# Patient Record
Sex: FEMALE | Race: ASIAN | NOT HISPANIC OR LATINO | URBAN - METROPOLITAN AREA
[De-identification: names, ages, dates, MRNs, and addresses within clinical notes are randomized per-mention and may not be internally consistent; named-entity substitution may affect disease eponyms.]

---

## 2017-06-03 ENCOUNTER — EMERGENCY (EMERGENCY)
Facility: HOSPITAL | Age: 62
LOS: 1 days | Discharge: HOME | End: 2017-06-03
Attending: EMERGENCY MEDICINE | Admitting: EMERGENCY MEDICINE
Payer: COMMERCIAL

## 2017-06-03 VITALS
RESPIRATION RATE: 18 BRPM | SYSTOLIC BLOOD PRESSURE: 187 MMHG | OXYGEN SATURATION: 99 % | TEMPERATURE: 100 F | HEART RATE: 90 BPM | DIASTOLIC BLOOD PRESSURE: 99 MMHG

## 2017-06-03 VITALS
RESPIRATION RATE: 19 BRPM | OXYGEN SATURATION: 99 % | SYSTOLIC BLOOD PRESSURE: 180 MMHG | HEART RATE: 85 BPM | TEMPERATURE: 98 F | DIASTOLIC BLOOD PRESSURE: 89 MMHG

## 2017-06-03 DIAGNOSIS — R19.7 DIARRHEA, UNSPECIFIED: ICD-10-CM

## 2017-06-03 DIAGNOSIS — I10 ESSENTIAL (PRIMARY) HYPERTENSION: ICD-10-CM

## 2017-06-03 DIAGNOSIS — Z79.899 OTHER LONG TERM (CURRENT) DRUG THERAPY: ICD-10-CM

## 2017-06-03 DIAGNOSIS — E78.5 HYPERLIPIDEMIA, UNSPECIFIED: ICD-10-CM

## 2017-06-03 DIAGNOSIS — R10.13 EPIGASTRIC PAIN: ICD-10-CM

## 2017-06-03 DIAGNOSIS — R11.2 NAUSEA WITH VOMITING, UNSPECIFIED: ICD-10-CM

## 2017-06-03 DIAGNOSIS — R10.12 LEFT UPPER QUADRANT PAIN: ICD-10-CM

## 2017-06-03 LAB
ALBUMIN SERPL ELPH-MCNC: 4.2 G/DL — SIGNIFICANT CHANGE UP (ref 3.3–5)
ALP SERPL-CCNC: 112 U/L — SIGNIFICANT CHANGE UP (ref 40–120)
ALT FLD-CCNC: 15 U/L RC — SIGNIFICANT CHANGE UP (ref 10–45)
ANION GAP SERPL CALC-SCNC: 12 MMOL/L — SIGNIFICANT CHANGE UP (ref 5–17)
APPEARANCE UR: CLEAR — SIGNIFICANT CHANGE UP
APTT BLD: 30.8 SEC — SIGNIFICANT CHANGE UP (ref 27.5–37.4)
AST SERPL-CCNC: 20 U/L — SIGNIFICANT CHANGE UP (ref 10–40)
BACTERIA # UR AUTO: ABNORMAL /HPF
BASOPHILS # BLD AUTO: 0 K/UL — SIGNIFICANT CHANGE UP (ref 0–0.2)
BASOPHILS NFR BLD AUTO: 0.2 % — SIGNIFICANT CHANGE UP (ref 0–2)
BILIRUB SERPL-MCNC: 1 MG/DL — SIGNIFICANT CHANGE UP (ref 0.2–1.2)
BILIRUB UR-MCNC: NEGATIVE — SIGNIFICANT CHANGE UP
BUN SERPL-MCNC: 18 MG/DL — SIGNIFICANT CHANGE UP (ref 7–23)
CALCIUM SERPL-MCNC: 9.5 MG/DL — SIGNIFICANT CHANGE UP (ref 8.4–10.5)
CHLORIDE SERPL-SCNC: 101 MMOL/L — SIGNIFICANT CHANGE UP (ref 96–108)
CO2 SERPL-SCNC: 28 MMOL/L — SIGNIFICANT CHANGE UP (ref 22–31)
COLOR SPEC: YELLOW — SIGNIFICANT CHANGE UP
CREAT SERPL-MCNC: 1.03 MG/DL — SIGNIFICANT CHANGE UP (ref 0.5–1.3)
DIFF PNL FLD: NEGATIVE — SIGNIFICANT CHANGE UP
EOSINOPHIL # BLD AUTO: 0 K/UL — SIGNIFICANT CHANGE UP (ref 0–0.5)
EOSINOPHIL NFR BLD AUTO: 0.3 % — SIGNIFICANT CHANGE UP (ref 0–6)
EPI CELLS # UR: SIGNIFICANT CHANGE UP /HPF
GAS PNL BLDV: SIGNIFICANT CHANGE UP
GLUCOSE SERPL-MCNC: 84 MG/DL — SIGNIFICANT CHANGE UP (ref 70–99)
GLUCOSE UR QL: NEGATIVE — SIGNIFICANT CHANGE UP
HCT VFR BLD CALC: 42.7 % — SIGNIFICANT CHANGE UP (ref 34.5–45)
HGB BLD-MCNC: 14.4 G/DL — SIGNIFICANT CHANGE UP (ref 11.5–15.5)
HYALINE CASTS # UR AUTO: ABNORMAL
INR BLD: 1.05 RATIO — SIGNIFICANT CHANGE UP (ref 0.88–1.16)
KETONES UR-MCNC: NEGATIVE — SIGNIFICANT CHANGE UP
LEUKOCYTE ESTERASE UR-ACNC: ABNORMAL
LIDOCAIN IGE QN: 24 U/L — SIGNIFICANT CHANGE UP (ref 7–60)
LYMPHOCYTES # BLD AUTO: 2.2 K/UL — SIGNIFICANT CHANGE UP (ref 1–3.3)
LYMPHOCYTES # BLD AUTO: 24 % — SIGNIFICANT CHANGE UP (ref 13–44)
MCHC RBC-ENTMCNC: 29.4 PG — SIGNIFICANT CHANGE UP (ref 27–34)
MCHC RBC-ENTMCNC: 33.6 GM/DL — SIGNIFICANT CHANGE UP (ref 32–36)
MCV RBC AUTO: 87.6 FL — SIGNIFICANT CHANGE UP (ref 80–100)
MONOCYTES # BLD AUTO: 0.7 K/UL — SIGNIFICANT CHANGE UP (ref 0–0.9)
MONOCYTES NFR BLD AUTO: 7.8 % — SIGNIFICANT CHANGE UP (ref 2–14)
NEUTROPHILS # BLD AUTO: 6.3 K/UL — SIGNIFICANT CHANGE UP (ref 1.8–7.4)
NEUTROPHILS NFR BLD AUTO: 67.8 % — SIGNIFICANT CHANGE UP (ref 43–77)
NITRITE UR-MCNC: NEGATIVE — SIGNIFICANT CHANGE UP
PH UR: 6.5 — SIGNIFICANT CHANGE UP (ref 5–8)
PLATELET # BLD AUTO: 225 K/UL — SIGNIFICANT CHANGE UP (ref 150–400)
POTASSIUM SERPL-MCNC: 3.4 MMOL/L — LOW (ref 3.5–5.3)
POTASSIUM SERPL-SCNC: 3.4 MMOL/L — LOW (ref 3.5–5.3)
PROT SERPL-MCNC: 8.1 G/DL — SIGNIFICANT CHANGE UP (ref 6–8.3)
PROT UR-MCNC: 30 MG/DL
PROTHROM AB SERPL-ACNC: 11.3 SEC — SIGNIFICANT CHANGE UP (ref 9.8–12.7)
RBC # BLD: 4.88 M/UL — SIGNIFICANT CHANGE UP (ref 3.8–5.2)
RBC # FLD: 11.9 % — SIGNIFICANT CHANGE UP (ref 10.3–14.5)
RBC CASTS # UR COMP ASSIST: SIGNIFICANT CHANGE UP /HPF (ref 0–2)
SODIUM SERPL-SCNC: 141 MMOL/L — SIGNIFICANT CHANGE UP (ref 135–145)
SP GR SPEC: 1.03 — HIGH (ref 1.01–1.02)
UROBILINOGEN FLD QL: NEGATIVE — SIGNIFICANT CHANGE UP
WBC # BLD: 9.3 K/UL — SIGNIFICANT CHANGE UP (ref 3.8–10.5)
WBC # FLD AUTO: 9.3 K/UL — SIGNIFICANT CHANGE UP (ref 3.8–10.5)
WBC UR QL: SIGNIFICANT CHANGE UP /HPF (ref 0–5)

## 2017-06-03 PROCEDURE — 85730 THROMBOPLASTIN TIME PARTIAL: CPT

## 2017-06-03 PROCEDURE — 84132 ASSAY OF SERUM POTASSIUM: CPT

## 2017-06-03 PROCEDURE — 85014 HEMATOCRIT: CPT

## 2017-06-03 PROCEDURE — 85610 PROTHROMBIN TIME: CPT

## 2017-06-03 PROCEDURE — 99285 EMERGENCY DEPT VISIT HI MDM: CPT

## 2017-06-03 PROCEDURE — 82803 BLOOD GASES ANY COMBINATION: CPT

## 2017-06-03 PROCEDURE — 85027 COMPLETE CBC AUTOMATED: CPT

## 2017-06-03 PROCEDURE — 74177 CT ABD & PELVIS W/CONTRAST: CPT

## 2017-06-03 PROCEDURE — 82947 ASSAY GLUCOSE BLOOD QUANT: CPT

## 2017-06-03 PROCEDURE — 83690 ASSAY OF LIPASE: CPT

## 2017-06-03 PROCEDURE — 80053 COMPREHEN METABOLIC PANEL: CPT

## 2017-06-03 PROCEDURE — 96374 THER/PROPH/DIAG INJ IV PUSH: CPT | Mod: XU

## 2017-06-03 PROCEDURE — 84295 ASSAY OF SERUM SODIUM: CPT

## 2017-06-03 PROCEDURE — 99284 EMERGENCY DEPT VISIT MOD MDM: CPT | Mod: 25

## 2017-06-03 PROCEDURE — 82435 ASSAY OF BLOOD CHLORIDE: CPT

## 2017-06-03 PROCEDURE — 81001 URINALYSIS AUTO W/SCOPE: CPT

## 2017-06-03 PROCEDURE — 82330 ASSAY OF CALCIUM: CPT

## 2017-06-03 PROCEDURE — 83605 ASSAY OF LACTIC ACID: CPT

## 2017-06-03 PROCEDURE — 74177 CT ABD & PELVIS W/CONTRAST: CPT | Mod: 26

## 2017-06-03 RX ORDER — CIPROFLOXACIN LACTATE 400MG/40ML
500 VIAL (ML) INTRAVENOUS ONCE
Qty: 0 | Refills: 0 | Status: COMPLETED | OUTPATIENT
Start: 2017-06-03 | End: 2017-06-03

## 2017-06-03 RX ORDER — ONDANSETRON 8 MG/1
4 TABLET, FILM COATED ORAL ONCE
Qty: 0 | Refills: 0 | Status: COMPLETED | OUTPATIENT
Start: 2017-06-03 | End: 2017-06-03

## 2017-06-03 RX ORDER — SODIUM CHLORIDE 9 MG/ML
1000 INJECTION INTRAMUSCULAR; INTRAVENOUS; SUBCUTANEOUS ONCE
Qty: 0 | Refills: 0 | Status: COMPLETED | OUTPATIENT
Start: 2017-06-03 | End: 2017-06-03

## 2017-06-03 RX ADMIN — Medication 500 MILLIGRAM(S): at 22:49

## 2017-06-03 RX ADMIN — ONDANSETRON 4 MILLIGRAM(S): 8 TABLET, FILM COATED ORAL at 16:52

## 2017-06-03 RX ADMIN — SODIUM CHLORIDE 4000 MILLILITER(S): 9 INJECTION INTRAMUSCULAR; INTRAVENOUS; SUBCUTANEOUS at 16:52

## 2017-06-03 NOTE — ED ADULT NURSE NOTE - CAS EDN DISCHARGE ASSESSMENT
Alert and oriented to person, place and time/No adverse reaction to first time med in ED Alert and oriented to person, place and time/dr almendarez made aware of sbp 180; pt is due for antihypertensive meds and will take them at home; ok for dc/No adverse reaction to first time med in ED

## 2017-06-03 NOTE — ED PROVIDER NOTE - ATTENDING CONTRIBUTION TO CARE
pt is a 60 y/o female c/o diarrhea multiple bouts, abd soft, mild epigastric tenderness, vomited a few times, no sick contacts likely gastroenteritis, ivf, labs nl. ct already ordered if nl to d.c home.

## 2017-06-03 NOTE — ED ADULT NURSE NOTE - OBJECTIVE STATEMENT
61 year old female alert and oriented x 4 came to the ED accompanied by  c/o abdominal pain, nausea, vomiting and diarrhea.  Patient said she wasn't feeling well in her Yoga class yesterday and later that night she vomited.  Since last night patient reports about 12 bouts of liquid stool and says she has not been able to eat or drink much, but has been able to hold a few sips of ginger ale down.  Patient reports dizziness, but  denies falls.  Pain is in the epigastric region and is 8/10 pain and denies taking medications for pain or diarrhea.  Patient took her BP medications today.  Patient denies; chest pain, shortness of breath, pain or burning on urination,  fevers. Patient has unlabored breathing with equal and symmetrical chest rise.  IV established in the right AC #20.  Safety ensured.

## 2017-06-03 NOTE — ED PROVIDER NOTE - OBJECTIVE STATEMENT
60 y/o F presents with 1 day of nausea/ vomiting, diarrhea, abdominal pain. no fever, no chills.  pain is left sided upper and lower nonradiating moderate and dull. no sick contact.s

## 2017-07-11 ENCOUNTER — INPATIENT (INPATIENT)
Facility: HOSPITAL | Age: 62
LOS: 2 days | Discharge: HOME | DRG: 304 | End: 2017-07-14
Attending: INTERNAL MEDICINE | Admitting: HOSPITALIST
Payer: COMMERCIAL

## 2017-07-11 VITALS
TEMPERATURE: 99 F | SYSTOLIC BLOOD PRESSURE: 201 MMHG | HEART RATE: 86 BPM | OXYGEN SATURATION: 100 % | DIASTOLIC BLOOD PRESSURE: 116 MMHG | RESPIRATION RATE: 20 BRPM

## 2017-07-11 LAB
APPEARANCE UR: CLEAR — SIGNIFICANT CHANGE UP
BASE EXCESS BLDV CALC-SCNC: 4.5 MMOL/L — HIGH (ref -2–2)
BILIRUB UR-MCNC: NEGATIVE — SIGNIFICANT CHANGE UP
CA-I SERPL-SCNC: 1.2 MMOL/L — SIGNIFICANT CHANGE UP (ref 1.12–1.3)
CHLORIDE BLDV-SCNC: 105 MMOL/L — SIGNIFICANT CHANGE UP (ref 96–108)
CK MB BLD-MCNC: 2.2 % — SIGNIFICANT CHANGE UP (ref 0–3.5)
CK MB CFR SERPL CALC: 3.1 NG/ML — SIGNIFICANT CHANGE UP (ref 0–3.8)
CK SERPL-CCNC: 140 U/L — SIGNIFICANT CHANGE UP (ref 25–170)
CO2 BLDV-SCNC: 33 MMOL/L — HIGH (ref 22–30)
COLOR SPEC: SIGNIFICANT CHANGE UP
DIFF PNL FLD: NEGATIVE — SIGNIFICANT CHANGE UP
EPI CELLS # UR: SIGNIFICANT CHANGE UP /HPF
ERYTHROCYTE [SEDIMENTATION RATE] IN BLOOD: 53 MM/HR — HIGH (ref 0–20)
GAS PNL BLDV: 143 MMOL/L — SIGNIFICANT CHANGE UP (ref 136–145)
GAS PNL BLDV: SIGNIFICANT CHANGE UP
GAS PNL BLDV: SIGNIFICANT CHANGE UP
GLUCOSE BLDV-MCNC: 87 MG/DL — SIGNIFICANT CHANGE UP (ref 70–99)
GLUCOSE UR QL: NEGATIVE — SIGNIFICANT CHANGE UP
HCO3 BLDV-SCNC: 31 MMOL/L — HIGH (ref 21–29)
HCT VFR BLD CALC: 40.7 % — SIGNIFICANT CHANGE UP (ref 34.5–45)
HCT VFR BLDA CALC: 42 % — SIGNIFICANT CHANGE UP (ref 39–50)
HGB BLD CALC-MCNC: 13.6 G/DL — SIGNIFICANT CHANGE UP (ref 11.5–15.5)
HGB BLD-MCNC: 13.6 G/DL — SIGNIFICANT CHANGE UP (ref 11.5–15.5)
KETONES UR-MCNC: NEGATIVE — SIGNIFICANT CHANGE UP
LACTATE BLDV-MCNC: 1.3 MMOL/L — SIGNIFICANT CHANGE UP (ref 0.7–2)
LEUKOCYTE ESTERASE UR-ACNC: ABNORMAL
MCHC RBC-ENTMCNC: 29.1 PG — SIGNIFICANT CHANGE UP (ref 27–34)
MCHC RBC-ENTMCNC: 33.5 GM/DL — SIGNIFICANT CHANGE UP (ref 32–36)
MCV RBC AUTO: 86.8 FL — SIGNIFICANT CHANGE UP (ref 80–100)
NITRITE UR-MCNC: NEGATIVE — SIGNIFICANT CHANGE UP
PCO2 BLDV: 58 MMHG — HIGH (ref 35–50)
PH BLDV: 7.35 — SIGNIFICANT CHANGE UP (ref 7.35–7.45)
PH UR: 7 — SIGNIFICANT CHANGE UP (ref 5–8)
PLATELET # BLD AUTO: 266 K/UL — SIGNIFICANT CHANGE UP (ref 150–400)
PO2 BLDV: 24 MMHG — LOW (ref 25–45)
POTASSIUM BLDV-SCNC: 3.9 MMOL/L — SIGNIFICANT CHANGE UP (ref 3.5–5)
PROT UR-MCNC: NEGATIVE — SIGNIFICANT CHANGE UP
RBC # BLD: 4.68 M/UL — SIGNIFICANT CHANGE UP (ref 3.8–5.2)
RBC # FLD: 11.7 % — SIGNIFICANT CHANGE UP (ref 10.3–14.5)
RBC CASTS # UR COMP ASSIST: SIGNIFICANT CHANGE UP /HPF (ref 0–2)
SAO2 % BLDV: 31 % — LOW (ref 67–88)
SP GR SPEC: 1.01 — SIGNIFICANT CHANGE UP (ref 1.01–1.02)
TROPONIN T SERPL-MCNC: <0.01 NG/ML — SIGNIFICANT CHANGE UP (ref 0–0.06)
UROBILINOGEN FLD QL: NEGATIVE — SIGNIFICANT CHANGE UP
WBC # BLD: 9.8 K/UL — SIGNIFICANT CHANGE UP (ref 3.8–10.5)
WBC # FLD AUTO: 9.8 K/UL — SIGNIFICANT CHANGE UP (ref 3.8–10.5)
WBC UR QL: SIGNIFICANT CHANGE UP /HPF (ref 0–5)

## 2017-07-11 PROCEDURE — 93010 ELECTROCARDIOGRAM REPORT: CPT

## 2017-07-11 PROCEDURE — 70450 CT HEAD/BRAIN W/O DYE: CPT | Mod: 26

## 2017-07-11 PROCEDURE — 99285 EMERGENCY DEPT VISIT HI MDM: CPT | Mod: 25

## 2017-07-11 RX ORDER — HYDRALAZINE HCL 50 MG
10 TABLET ORAL ONCE
Qty: 0 | Refills: 0 | Status: COMPLETED | OUTPATIENT
Start: 2017-07-11 | End: 2017-07-11

## 2017-07-11 RX ORDER — MORPHINE SULFATE 50 MG/1
4 CAPSULE, EXTENDED RELEASE ORAL ONCE
Qty: 0 | Refills: 0 | Status: DISCONTINUED | OUTPATIENT
Start: 2017-07-11 | End: 2017-07-11

## 2017-07-11 RX ADMIN — MORPHINE SULFATE 4 MILLIGRAM(S): 50 CAPSULE, EXTENDED RELEASE ORAL at 21:19

## 2017-07-11 RX ADMIN — MORPHINE SULFATE 4 MILLIGRAM(S): 50 CAPSULE, EXTENDED RELEASE ORAL at 20:34

## 2017-07-11 RX ADMIN — Medication 10 MILLIGRAM(S): at 20:34

## 2017-07-11 NOTE — ED PROVIDER NOTE - OBJECTIVE STATEMENT
Pt is a 63 yo F with a PMH of HTN and HLD who presents to the ED c/o a Pt is a 63 yo F with a PMH of HTN and HLD who presents to the ED c/o of a new onset headache and high blood pressure. At 2pm today, the pt began having a R temporal headache that was "throbbing" and 10/10 on the pain scale. She went to an urgent care and was found to have BPs in the 200s/110s and was instructed to come to the ER for further evaluation. She typically has BPs in the 170s-180s systolic and takes amlodipine, HCTZ, hydralazine, and labetalol regularly. Today in the ED, her pressures remain in the 200s/110s. She has had intermittent blurry vision, palpitations, and nausea associated with the HA. She denies any photophobia, vomiting, dizziness, CP, SOB, or abdominal pain. She last was in the ED on 6/3/17 for NVD and abdominal pain with BPs in the 200s systolic, however she has never experienced this HA before.

## 2017-07-11 NOTE — ED PROVIDER NOTE - ATTENDING CONTRIBUTION TO CARE
I have seen and evaluated this patient with the resident.   I agree with the findings  unless other wise stated.  I have made appropriate changes in documentations where needed, After my face to face bedside evaluation, I am further  noting:

## 2017-07-11 NOTE — ED ADULT NURSE NOTE - OBJECTIVE STATEMENT
62 y.o. Female presents to the ED c/o headache on R temporal area since 2pm and hypertension. Hx HTN and HLD - on labetolol, simivastatin, hydralazine and amlodipine. Pt states she was shopping at [employer] when she felt the headache. Pt describes the headache as a sharp headache that comes and goes. Pt also c/o R eye blurriness with the headache. Pt went to urgent care and was sent here for hypertension. Pt did not take anything for the pain. A&Ox3. Denies CP, SOB, N/V/D, urinary/bowel complications, fever/chills. Pt is in no current distress. Comfort and safety provided.  at bedside.

## 2017-07-11 NOTE — ED PROVIDER NOTE - MEDICAL DECISION MAKING DETAILS
Pt with uncontrolled BP on multiple meds headache Pt with uncontrolled BP on multiple meds headache and blurry vision with elevated BOP pt states she is compliant to meds will admit-- Lin

## 2017-07-11 NOTE — ED ADULT NURSE REASSESSMENT NOTE - NS ED NURSE REASSESS COMMENT FT1
Pt is in no current distress. Comfort and safety provided.  at bedside.
Pt is in no current distress. Comfort and safety provided. pt states blurry vision of R eye has resolved.

## 2017-07-12 DIAGNOSIS — I16.0 HYPERTENSIVE URGENCY: ICD-10-CM

## 2017-07-12 DIAGNOSIS — H53.8 OTHER VISUAL DISTURBANCES: ICD-10-CM

## 2017-07-12 DIAGNOSIS — E78.5 HYPERLIPIDEMIA, UNSPECIFIED: ICD-10-CM

## 2017-07-12 DIAGNOSIS — I16.1 HYPERTENSIVE EMERGENCY: ICD-10-CM

## 2017-07-12 DIAGNOSIS — Z29.9 ENCOUNTER FOR PROPHYLACTIC MEASURES, UNSPECIFIED: ICD-10-CM

## 2017-07-12 DIAGNOSIS — R47.1 DYSARTHRIA AND ANARTHRIA: ICD-10-CM

## 2017-07-12 LAB
ALBUMIN SERPL ELPH-MCNC: 3.9 G/DL — SIGNIFICANT CHANGE UP (ref 3.3–5)
ALP SERPL-CCNC: 115 U/L — SIGNIFICANT CHANGE UP (ref 40–120)
ALT FLD-CCNC: 18 U/L RC — SIGNIFICANT CHANGE UP (ref 10–45)
ANION GAP SERPL CALC-SCNC: 15 MMOL/L — SIGNIFICANT CHANGE UP (ref 5–17)
APTT BLD: 33.1 SEC — SIGNIFICANT CHANGE UP (ref 27.5–37.4)
AST SERPL-CCNC: 19 U/L — SIGNIFICANT CHANGE UP (ref 10–40)
BASOPHILS # BLD AUTO: 0 K/UL — SIGNIFICANT CHANGE UP (ref 0–0.2)
BASOPHILS # BLD AUTO: 0.1 K/UL — SIGNIFICANT CHANGE UP (ref 0–0.2)
BASOPHILS NFR BLD AUTO: 0.3 % — SIGNIFICANT CHANGE UP (ref 0–2)
BASOPHILS NFR BLD AUTO: 0.6 % — SIGNIFICANT CHANGE UP (ref 0–2)
BILIRUB SERPL-MCNC: 0.6 MG/DL — SIGNIFICANT CHANGE UP (ref 0.2–1.2)
BUN SERPL-MCNC: 11 MG/DL — SIGNIFICANT CHANGE UP (ref 7–23)
CALCIUM SERPL-MCNC: 9.1 MG/DL — SIGNIFICANT CHANGE UP (ref 8.4–10.5)
CHLORIDE SERPL-SCNC: 102 MMOL/L — SIGNIFICANT CHANGE UP (ref 96–108)
CK MB CFR SERPL CALC: 2 NG/ML — SIGNIFICANT CHANGE UP (ref 0–3.8)
CK SERPL-CCNC: 77 U/L — SIGNIFICANT CHANGE UP (ref 25–170)
CO2 SERPL-SCNC: 26 MMOL/L — SIGNIFICANT CHANGE UP (ref 22–31)
CREAT SERPL-MCNC: 0.8 MG/DL — SIGNIFICANT CHANGE UP (ref 0.5–1.3)
EOSINOPHIL # BLD AUTO: 0.1 K/UL — SIGNIFICANT CHANGE UP (ref 0–0.5)
EOSINOPHIL # BLD AUTO: 0.2 K/UL — SIGNIFICANT CHANGE UP (ref 0–0.5)
EOSINOPHIL NFR BLD AUTO: 1.4 % — SIGNIFICANT CHANGE UP (ref 0–6)
EOSINOPHIL NFR BLD AUTO: 1.5 % — SIGNIFICANT CHANGE UP (ref 0–6)
GLUCOSE SERPL-MCNC: 104 MG/DL — HIGH (ref 70–99)
HBA1C BLD-MCNC: 5.3 % — SIGNIFICANT CHANGE UP (ref 4–5.6)
HCT VFR BLD CALC: 36.1 % — SIGNIFICANT CHANGE UP (ref 34.5–45)
HCT VFR BLD CALC: 38.4 % — SIGNIFICANT CHANGE UP (ref 34.5–45)
HGB BLD-MCNC: 12.4 G/DL — SIGNIFICANT CHANGE UP (ref 11.5–15.5)
HGB BLD-MCNC: 13.4 G/DL — SIGNIFICANT CHANGE UP (ref 11.5–15.5)
INR BLD: 1.06 RATIO — SIGNIFICANT CHANGE UP (ref 0.88–1.16)
LYMPHOCYTES # BLD AUTO: 2.4 K/UL — SIGNIFICANT CHANGE UP (ref 1–3.3)
LYMPHOCYTES # BLD AUTO: 2.5 K/UL — SIGNIFICANT CHANGE UP (ref 1–3.3)
LYMPHOCYTES # BLD AUTO: 21.8 % — SIGNIFICANT CHANGE UP (ref 13–44)
LYMPHOCYTES # BLD AUTO: 27.3 % — SIGNIFICANT CHANGE UP (ref 13–44)
MAGNESIUM SERPL-MCNC: 2.2 MG/DL — SIGNIFICANT CHANGE UP (ref 1.6–2.6)
MCHC RBC-ENTMCNC: 29.9 PG — SIGNIFICANT CHANGE UP (ref 27–34)
MCHC RBC-ENTMCNC: 30.2 PG — SIGNIFICANT CHANGE UP (ref 27–34)
MCHC RBC-ENTMCNC: 34.3 GM/DL — SIGNIFICANT CHANGE UP (ref 32–36)
MCHC RBC-ENTMCNC: 34.9 GM/DL — SIGNIFICANT CHANGE UP (ref 32–36)
MCV RBC AUTO: 86.7 FL — SIGNIFICANT CHANGE UP (ref 80–100)
MCV RBC AUTO: 87.3 FL — SIGNIFICANT CHANGE UP (ref 80–100)
MONOCYTES # BLD AUTO: 0.5 K/UL — SIGNIFICANT CHANGE UP (ref 0–0.9)
MONOCYTES # BLD AUTO: 0.6 K/UL — SIGNIFICANT CHANGE UP (ref 0–0.9)
MONOCYTES NFR BLD AUTO: 4.7 % — SIGNIFICANT CHANGE UP (ref 2–14)
MONOCYTES NFR BLD AUTO: 6.3 % — SIGNIFICANT CHANGE UP (ref 2–14)
NEUTROPHILS # BLD AUTO: 5.9 K/UL — SIGNIFICANT CHANGE UP (ref 1.8–7.4)
NEUTROPHILS # BLD AUTO: 7.9 K/UL — HIGH (ref 1.8–7.4)
NEUTROPHILS NFR BLD AUTO: 64.7 % — SIGNIFICANT CHANGE UP (ref 43–77)
NEUTROPHILS NFR BLD AUTO: 71.5 % — SIGNIFICANT CHANGE UP (ref 43–77)
PHOSPHATE SERPL-MCNC: 3.7 MG/DL — SIGNIFICANT CHANGE UP (ref 2.5–4.5)
PLATELET # BLD AUTO: 251 K/UL — SIGNIFICANT CHANGE UP (ref 150–400)
PLATELET # BLD AUTO: 261 K/UL — SIGNIFICANT CHANGE UP (ref 150–400)
POTASSIUM SERPL-MCNC: 3.4 MMOL/L — LOW (ref 3.5–5.3)
POTASSIUM SERPL-SCNC: 3.4 MMOL/L — LOW (ref 3.5–5.3)
PROT SERPL-MCNC: 7.4 G/DL — SIGNIFICANT CHANGE UP (ref 6–8.3)
PROTHROM AB SERPL-ACNC: 11.6 SEC — SIGNIFICANT CHANGE UP (ref 9.8–12.7)
RBC # BLD: 4.14 M/UL — SIGNIFICANT CHANGE UP (ref 3.8–5.2)
RBC # BLD: 4.43 M/UL — SIGNIFICANT CHANGE UP (ref 3.8–5.2)
RBC # FLD: 11.7 % — SIGNIFICANT CHANGE UP (ref 10.3–14.5)
RBC # FLD: 11.8 % — SIGNIFICANT CHANGE UP (ref 10.3–14.5)
SODIUM SERPL-SCNC: 143 MMOL/L — SIGNIFICANT CHANGE UP (ref 135–145)
TROPONIN T SERPL-MCNC: <0.01 NG/ML — SIGNIFICANT CHANGE UP (ref 0–0.06)
TSH SERPL-MCNC: 1.71 UIU/ML — SIGNIFICANT CHANGE UP (ref 0.27–4.2)
WBC # BLD: 11 K/UL — HIGH (ref 3.8–10.5)
WBC # BLD: 9.1 K/UL — SIGNIFICANT CHANGE UP (ref 3.8–10.5)
WBC # FLD AUTO: 11 K/UL — HIGH (ref 3.8–10.5)
WBC # FLD AUTO: 9.1 K/UL — SIGNIFICANT CHANGE UP (ref 3.8–10.5)

## 2017-07-12 PROCEDURE — 71010: CPT | Mod: 26,59

## 2017-07-12 PROCEDURE — 99223 1ST HOSP IP/OBS HIGH 75: CPT

## 2017-07-12 PROCEDURE — 71020: CPT | Mod: 26

## 2017-07-12 PROCEDURE — 70450 CT HEAD/BRAIN W/O DYE: CPT | Mod: 26

## 2017-07-12 PROCEDURE — 93975 VASCULAR STUDY: CPT | Mod: 26

## 2017-07-12 PROCEDURE — 93010 ELECTROCARDIOGRAM REPORT: CPT

## 2017-07-12 RX ORDER — DOCUSATE SODIUM 100 MG
100 CAPSULE ORAL THREE TIMES A DAY
Qty: 0 | Refills: 0 | Status: DISCONTINUED | OUTPATIENT
Start: 2017-07-12 | End: 2017-07-14

## 2017-07-12 RX ORDER — HYDRALAZINE HCL 50 MG
20 TABLET ORAL THREE TIMES A DAY
Qty: 0 | Refills: 0 | Status: DISCONTINUED | OUTPATIENT
Start: 2017-07-12 | End: 2017-07-12

## 2017-07-12 RX ORDER — LABETALOL HCL 100 MG
400 TABLET ORAL
Qty: 0 | Refills: 0 | Status: DISCONTINUED | OUTPATIENT
Start: 2017-07-12 | End: 2017-07-12

## 2017-07-12 RX ORDER — SENNA PLUS 8.6 MG/1
2 TABLET ORAL AT BEDTIME
Qty: 0 | Refills: 0 | Status: DISCONTINUED | OUTPATIENT
Start: 2017-07-12 | End: 2017-07-14

## 2017-07-12 RX ORDER — MONTELUKAST 4 MG/1
10 TABLET, CHEWABLE ORAL DAILY
Qty: 0 | Refills: 0 | Status: DISCONTINUED | OUTPATIENT
Start: 2017-07-12 | End: 2017-07-14

## 2017-07-12 RX ORDER — HEPARIN SODIUM 5000 [USP'U]/ML
5000 INJECTION INTRAVENOUS; SUBCUTANEOUS EVERY 8 HOURS
Qty: 0 | Refills: 0 | Status: DISCONTINUED | OUTPATIENT
Start: 2017-07-12 | End: 2017-07-13

## 2017-07-12 RX ORDER — DICLOFENAC SODIUM 75 MG/1
1 TABLET, DELAYED RELEASE ORAL
Qty: 0 | Refills: 0 | COMMUNITY

## 2017-07-12 RX ORDER — METOPROLOL TARTRATE 50 MG
1 TABLET ORAL
Qty: 0 | Refills: 0 | COMMUNITY

## 2017-07-12 RX ORDER — BUPROPION HYDROCHLORIDE 150 MG/1
1 TABLET, EXTENDED RELEASE ORAL
Qty: 0 | Refills: 0 | COMMUNITY

## 2017-07-12 RX ORDER — SIMVASTATIN 20 MG/1
40 TABLET, FILM COATED ORAL AT BEDTIME
Qty: 0 | Refills: 0 | Status: DISCONTINUED | OUTPATIENT
Start: 2017-07-12 | End: 2017-07-14

## 2017-07-12 RX ORDER — TRAZODONE HCL 50 MG
1 TABLET ORAL
Qty: 0 | Refills: 0 | COMMUNITY

## 2017-07-12 RX ORDER — TRAZODONE HCL 50 MG
300 TABLET ORAL AT BEDTIME
Qty: 0 | Refills: 0 | Status: DISCONTINUED | OUTPATIENT
Start: 2017-07-12 | End: 2017-07-14

## 2017-07-12 RX ORDER — POTASSIUM CHLORIDE 20 MEQ
40 PACKET (EA) ORAL ONCE
Qty: 0 | Refills: 0 | Status: COMPLETED | OUTPATIENT
Start: 2017-07-12 | End: 2017-07-12

## 2017-07-12 RX ORDER — VERAPAMIL HCL 240 MG
180 CAPSULE, EXTENDED RELEASE PELLETS 24 HR ORAL DAILY
Qty: 0 | Refills: 0 | Status: DISCONTINUED | OUTPATIENT
Start: 2017-07-12 | End: 2017-07-12

## 2017-07-12 RX ORDER — LABETALOL HCL 100 MG
1 TABLET ORAL
Qty: 0 | Refills: 0 | COMMUNITY

## 2017-07-12 RX ORDER — MELOXICAM 15 MG/1
0 TABLET ORAL
Qty: 0 | Refills: 0 | COMMUNITY

## 2017-07-12 RX ORDER — ESCITALOPRAM OXALATE 10 MG/1
10 TABLET, FILM COATED ORAL DAILY
Qty: 0 | Refills: 0 | Status: DISCONTINUED | OUTPATIENT
Start: 2017-07-12 | End: 2017-07-14

## 2017-07-12 RX ORDER — SERTRALINE 25 MG/1
1 TABLET, FILM COATED ORAL
Qty: 0 | Refills: 0 | COMMUNITY

## 2017-07-12 RX ORDER — POTASSIUM CHLORIDE 20 MEQ
20 PACKET (EA) ORAL ONCE
Qty: 0 | Refills: 0 | Status: COMPLETED | OUTPATIENT
Start: 2017-07-12 | End: 2017-07-12

## 2017-07-12 RX ORDER — AMLODIPINE BESYLATE 2.5 MG/1
10 TABLET ORAL DAILY
Qty: 0 | Refills: 0 | Status: DISCONTINUED | OUTPATIENT
Start: 2017-07-12 | End: 2017-07-12

## 2017-07-12 RX ORDER — HYDROCHLOROTHIAZIDE 25 MG
25 TABLET ORAL DAILY
Qty: 0 | Refills: 0 | Status: DISCONTINUED | OUTPATIENT
Start: 2017-07-12 | End: 2017-07-12

## 2017-07-12 RX ADMIN — Medication 20 MILLIGRAM(S): at 05:49

## 2017-07-12 RX ADMIN — Medication 180 MILLIGRAM(S): at 05:50

## 2017-07-12 RX ADMIN — SIMVASTATIN 40 MILLIGRAM(S): 20 TABLET, FILM COATED ORAL at 21:18

## 2017-07-12 RX ADMIN — ESCITALOPRAM OXALATE 10 MILLIGRAM(S): 10 TABLET, FILM COATED ORAL at 16:52

## 2017-07-12 RX ADMIN — HEPARIN SODIUM 5000 UNIT(S): 5000 INJECTION INTRAVENOUS; SUBCUTANEOUS at 16:52

## 2017-07-12 RX ADMIN — HEPARIN SODIUM 5000 UNIT(S): 5000 INJECTION INTRAVENOUS; SUBCUTANEOUS at 21:18

## 2017-07-12 RX ADMIN — AMLODIPINE BESYLATE 10 MILLIGRAM(S): 2.5 TABLET ORAL at 05:48

## 2017-07-12 RX ADMIN — Medication 300 MILLIGRAM(S): at 21:17

## 2017-07-12 RX ADMIN — HEPARIN SODIUM 5000 UNIT(S): 5000 INJECTION INTRAVENOUS; SUBCUTANEOUS at 05:48

## 2017-07-12 RX ADMIN — Medication 25 MILLIGRAM(S): at 05:50

## 2017-07-12 RX ADMIN — MONTELUKAST 10 MILLIGRAM(S): 4 TABLET, CHEWABLE ORAL at 16:51

## 2017-07-12 RX ADMIN — Medication 40 MILLIEQUIVALENT(S): at 21:17

## 2017-07-12 RX ADMIN — Medication 100 MILLIGRAM(S): at 16:52

## 2017-07-12 RX ADMIN — Medication 100 MILLIGRAM(S): at 21:18

## 2017-07-12 RX ADMIN — Medication 400 MILLIGRAM(S): at 05:50

## 2017-07-12 RX ADMIN — Medication 20 MILLIEQUIVALENT(S): at 18:24

## 2017-07-12 RX ADMIN — Medication 100 MILLIGRAM(S): at 05:48

## 2017-07-12 NOTE — CHART NOTE - NSCHARTNOTEFT_GEN_A_CORE
MAR Rapid Response Note    63 y/o F hx HTN admitted with blurry vision and h/a with SBP>200 overnight. Rapid response called for hypotension 75/42. Patient also with garbled speech and weakness prompting Code Stroke. Neurology and primary team at bedside. She received several medications at one time including amlodipine, HCTZ, hydralazine, verapamil, and labetalol. Patient received 1L fluid bolus and blood pressure improved to SBP 99/63. Neurological exam at bedside non-focal according to neurology resident. Patient's speech improved during rapid response. Impression is of iatrogenic hypotension secondary to receiving multiple oral medications at one time.   Recommendation:  -hold BP meds for now  -frequent vital signs  -IVF as required given several of medications are long acting  -neuro recommends eventual non-con CTH when BP normalizes  -discussed with primary team at bedside.

## 2017-07-12 NOTE — H&P ADULT - NSHPSOCIALHISTORY_GEN_ALL_CORE
Social History:    Marital Status: ( x ) , (  ) Single, (  ) , (  )   Lives with: (  ) alone, ( x ) children, (  ) spouse, (  ) parents, (  ) other  Occupation:     Substance Use/Illicit Drugs: (  ) never used, (  ) other:   Tobacco Usage: ( x ) never smoked, (  ) former smoker, (  ) current smoker, (  ) last cigarette date: , Total Pack-Years:   Alcohol Usage: socially

## 2017-07-12 NOTE — H&P ADULT - PROBLEM SELECTOR PLAN 2
- pt reportedly saw ophtho 2 weeks ago  - recommend getting ophtho exam results to eval for hypertensive emergency or GCA.  - less likely GCA, cont to monitor

## 2017-07-12 NOTE — CONSULT NOTE ADULT - SUBJECTIVE AND OBJECTIVE BOX
Neurology Consult    Name  YESSICA DOVER    62F c hx htn, hld, anxiety, depression, asthma, pw acute onset headache and blurry vision.  Pt states she's never had headache like this before. Today, she had about 14 episodes of brief 10/10 right temporal headache associated with nausea, palpitations, and right eye blurry vision.  Patient found with /110.  Admitted to [facility] for hypertensive emergency.  On 7/12/17, she was administered multiple blood pressure medications, which ultimately dropped her SBP to 80s.  Patient began to have garbled speech to nursing staff, which prompted a stroke code.  Patient was examined at bedside,   She had no focal neurological deficits, and dysarthria was non-focal.  NIHSS 0, MRS 0                                                           MEDICATIONS  (STANDING):  escitalopram 10 milliGRAM(s) Oral daily  traZODone 300 milliGRAM(s) Oral at bedtime  simvastatin 40 milliGRAM(s) Oral at bedtime  montelukast 10 milliGRAM(s) Oral daily  heparin  Injectable 5000 Unit(s) SubCutaneous every 8 hours  docusate sodium 100 milliGRAM(s) Oral three times a day    MEDICATIONS  (PRN):  senna 2 Tablet(s) Oral at bedtime PRN Constipation      Allergies    No Known Allergies    Intolerances        Objective  Vital Signs Last 24 Hrs  T(C): 36.6 (12 Jul 2017 05:10), Max: 37 (11 Jul 2017 19:15)  T(F): 97.8 (12 Jul 2017 05:10), Max: 98.6 (11 Jul 2017 19:15)  HR: 65 (12 Jul 2017 08:06) (65 - 97)  BP: 75/42 (12 Jul 2017 08:06) (75/42 - 206/110)  BP(mean): --  RR: 18 (12 Jul 2017 08:06) (14 - 20)  SpO2: 99% (12 Jul 2017 08:06) (97% - 100%)    General Exam   General appearance: No acute distress, well-nourished  Respiratory:    non-labored respirations               Neurological Exam  Mental Status:  alert and oriented x3, fluent speech, following commands  Cranial Nerves: PERRL, EOMI without nystagmus, visual fields intact no facial droop, no dysarthria    Motor:   Tone:   normal               Strength:  Upper extremity                          Delt       Bicep    Tricep                                                  R         5/5        5/5        5/5       5/5                                               L          5/5        5/5        5/5      5/5    Lower extremity                           HF          KE          KF        DF         PF                                               R        5/5        5/5        5/5       5/5       5/5                                               L         5/5        5/5        5/5      5/5        5/5    Pronator drift:   none           Dysmetria: none with finger-to-nose testing  Tremor:  none appreciated at rest or in action    Sensation: intact grossly to light touch        Other Studies    07-12    143  |  102  |  11  ----------------------------<  104<H>  3.4<L>   |  26  |  0.80    Ca    9.1      12 Jul 2017 05:27  Phos  3.7     07-12  Mg     2.2     07-12    TPro  7.4  /  Alb  3.9  /  TBili  0.6  /  DBili  x   /  AST  19  /  ALT  18  /  AlkPhos  115  07-12 07-12    143  |  102  |  11  ----------------------------<  104<H>  3.4<L>   |  26  |  0.80    Ca    9.1      12 Jul 2017 05:27  Phos  3.7     07-12  Mg     2.2     07-12    TPro  7.4  /  Alb  3.9  /  TBili  0.6  /  DBili  x   /  AST  19  /  ALT  18  /  AlkPhos  115  07-12    LIVER FUNCTIONS - ( 12 Jul 2017 05:27 )  Alb: 3.9 g/dL / Pro: 7.4 g/dL / ALK PHOS: 115 U/L / ALT: 18 U/L RC / AST: 19 U/L / GGT: x             Radiology

## 2017-07-12 NOTE — H&P ADULT - NSHPPHYSICALEXAM_GEN_ALL_CORE
PHYSICAL EXAM:   GENERAL: Alert. No acute distress. Well-developed. Not obese. Not cachectic.  HEAD:  Atraumatic. Normocephalic.  EYES: EOMI. PERRLA. Normal conjunctiva/sclera.  ENT: Neck supple. No JVD. Moist oral mucosa. Not edentulous.  HEART: RRR. S1. S2. No murmur. No rub. No distant heart sounds.  CHEST/LUNG: CTAB. BS equal bilaterally. No wheezes. No rales. No rhonchi.  ABDOMEN: Soft. Not tender. Not distended. Normal Bowel sounds.  BACK: No spinal tenderness. No flank tenderness.  VASCULAR: +2 b/l radial pulses. Palpable DP pulses.  EXTREMITIES:  No clubbing. No cyanosis. No edema. Moving all 4.  NEUROLOGY: A&Ox3. Non-focal exam. Cranial nerves intact.  PSYCH: Normal behavior. Normal affect.  SKIN: Normal color. No rashes. No lesions.  Vascular Access:

## 2017-07-12 NOTE — H&P ADULT - NSHPLABSRESULTS_GEN_ALL_CORE
Personally reviewed labs.   Personally reviewed imaging.   Personally reviewed EKG. NSR, rate 92, . Fascicular block. No ST or TW changes.                          13.6   9.8   )-----------( 266      ( 2017 20:51 )             40.7       07-11    145  |  103  |  13  ----------------------------<  85  3.5   |  27  |  0.96    Ca    9.2      2017 20:28    TPro  7.9  /  Alb  4.2  /  TBili  0.4  /  DBili  x   /  AST  20  /  ALT  18  /  AlkPhos  123<H>        CARDIAC MARKERS ( 2017 20:28 )  x     / <0.01 ng/mL / 140 U/L / x     / 3.1 ng/mL        LIVER FUNCTIONS - ( 2017 20:28 )  Alb: 4.2 g/dL / Pro: 7.9 g/dL / ALK PHOS: 123 U/L / ALT: 18 U/L RC / AST: 20 U/L / GGT: x                 Urinalysis Basic - ( 2017 23:46 )    Color: x / Appearance: Clear / S.010 / pH: x  Gluc: x / Ketone: Negative  / Bili: Negative / Urobili: Negative   Blood: x / Protein: Negative / Nitrite: Negative   Leuk Esterase: Trace / RBC: 0-2 /HPF / WBC 3-5 /HPF   Sq Epi: x / Non Sq Epi: OCC /HPF / Bacteria: x

## 2017-07-12 NOTE — H&P ADULT - PROBLEM SELECTOR PLAN 1
- cont verapamil, hctz, amlodipine  - increase labetalol and po hydralazine  - check renal u/s and renin/giacomo levels  - check tsh, a1c, lipids

## 2017-07-12 NOTE — CONSULT NOTE ADULT - ASSESSMENT
61 yo female initially admitted for hypertensive emergency, patient was administered multiple blood pressure medications, which dropped her BP rapidly, causing some garbled speech.  No focal neurological deficits noted on exam, tPA not administered due to patient returning to her baseline.

## 2017-07-12 NOTE — CHART NOTE - NSCHARTNOTEFT_GEN_A_CORE
Noticed pt was having change in status when PCA Norah was attempting to get pt oob at 8:04.  Pt was normal at 8:00 sitting up in bed.  Pt then started to state "I am not feeling right" then speech became incoherent and garbled with floundering of upper extremities.  BP at this time was 102/50 while lying down.  Code stroke called at 8:06am.  Team arrived at 8:07am.  Medicine team 4 paged overhead and RRT at the request of Stroke team 2/2 BP dropped to 89/50.  Pt regained speech and controlled movements in her extremities by 8:10am.  Labs requested were ordered and sent.  PT awaiting CT scan.  Medicine Team 4 at bedside, stroke cancelled at 8:20am 2/2 cause most likely from sudden drop in blood pressure.    Vital Signs T(C): 36.6 (07-12-17 @ 05:10), Max: 37 (07-11-17 @ 19:15)  HR: 65 (07-12-17 @ 08:06) (65 - 97)  BP: 75/42 (07-12-17 @ 08:06) (75/42 - 206/110)  RR: 18 (07-12-17 @ 08:06) (14 - 20)  SpO2: 99% (07-12-17 @ 08:06) (97% - 100%)  Wt(kg): --     Medications escitalopram 10 milliGRAM(s) Oral daily  traZODone 300 milliGRAM(s) Oral at bedtime  simvastatin 40 milliGRAM(s) Oral at bedtime  montelukast 10 milliGRAM(s) Oral daily  heparin  Injectable 5000 Unit(s) SubCutaneous every 8 hours  senna 2 Tablet(s) Oral at bedtime PRN  docusate sodium 100 milliGRAM(s) Oral three times a day    HPI:  62F c hx htn, hld, anxiety, depression, asthma, pw acute onset headache and blurry vision.    Pt states she's never had headache like this before. Today, she had about 14 episodes of brief 10/10 right temporal headache associated with nausea, palpitations, and right eye blurry vision. Denies any anxiety today. Denies jaw claudication, fevers, chills, CP, SOB, abd pain. Pt states she saw her ophthalmologist about 2 weeks ago who prescribed her new glasses, but she is not aware of the other results of her eye exam. Pt states she's never had a kidney ultrasound before. Pt states she takes all of her meds, including BP meds.    VS: Tm 98.6, /110, R 18, 100% RA  In the ED, received hydralazine 10IV + morphine 4IV (12 Jul 2017 02:43)    Plan:  as per neuro  as per Medicine Team 4

## 2017-07-12 NOTE — H&P ADULT - HISTORY OF PRESENT ILLNESS
62F c hx htn, hld, anxiety, depression, asthma, pw acute onset headache and blurry vision.    Pt states she's never had headache like this before. Today, she had about 14 episodes of brief 10/10 right temporal headache associated with nausea, palpitations, and right eye blurry vision. Denies any anxiety today. Denies jaw claudication, fevers, chills, CP, SOB, abd pain. Pt states she saw her ophthalmologist about 2 weeks ago who prescribed her new glasses, but she is not aware of the other results of her eye exam. Pt states she's never had a kidney ultrasound before. Pt states she takes all of her meds, including BP meds.    VS: Tm 98.6, /110, R 18, 100% RA  In the ED, received hydralazine 10IV + morphine 4IV

## 2017-07-12 NOTE — CONSULT NOTE ADULT - ATTENDING COMMENTS
Seen and examined on rounds with housestaff.    Stroke code called for encephalopathy and inattention in the setting of hypotension.  Now at baseline.  CT head unremarkable.    No further inpatient neuro workup at this time.  Outpatient fu [phone].

## 2017-07-12 NOTE — H&P ADULT - NSHPREVIEWOFSYSTEMS_GEN_ALL_CORE
REVIEW OF SYSTEMS:  CONSTITUTIONAL: No weakness. No fevers. No chills. No weight loss. Good appetite.  EYES: right eye blurry vision. No eye pain.  ENT: No hearing difficulty. No vertigo. No dysphagia. No Sinusitis/rhinorrhea.  NECK: No pain. No stiffness/rigidity.  CARDIAC: No chest pain. +palpitations.  RESPIRATORY: No cough. No SOB. No hemoptysis.  GASTROINTESTINAL: No abdominal pain. +nausea. No vomiting. No hematemesis. No diarrhea. No constipation. No melena. No hematochezia.  GENITOURINARY: No dysuria. No frequency. No hesitancy. No hematuria.  NEUROLOGICAL: No numbness. No focal weakness. No incontinence. +headache.  BACK: No back pain.  EXTREMITIES: No lower extremity edema. Full ROM.  SKIN: No rashes. No itching. No other lesions.  PSYCHIATRIC: +depression. +anxiety. No SI/HI.  ALLERGIC: No lip swelling. No hives.  All other review of systems is negative unless indicated above.  [  ] Unable to assess ROS because

## 2017-07-13 LAB
ALBUMIN SERPL ELPH-MCNC: 3.8 G/DL — SIGNIFICANT CHANGE UP (ref 3.3–5)
ALDOST SERPL-MCNC: 9 NG/DL — SIGNIFICANT CHANGE UP
ALP SERPL-CCNC: 107 U/L — SIGNIFICANT CHANGE UP (ref 40–120)
ALT FLD-CCNC: 15 U/L RC — SIGNIFICANT CHANGE UP (ref 10–45)
ANION GAP SERPL CALC-SCNC: 14 MMOL/L — SIGNIFICANT CHANGE UP (ref 5–17)
AST SERPL-CCNC: 15 U/L — SIGNIFICANT CHANGE UP (ref 10–40)
BASOPHILS # BLD AUTO: 0 K/UL — SIGNIFICANT CHANGE UP (ref 0–0.2)
BASOPHILS NFR BLD AUTO: 0.4 % — SIGNIFICANT CHANGE UP (ref 0–2)
BILIRUB SERPL-MCNC: 0.7 MG/DL — SIGNIFICANT CHANGE UP (ref 0.2–1.2)
BUN SERPL-MCNC: 20 MG/DL — SIGNIFICANT CHANGE UP (ref 7–23)
CALCIUM SERPL-MCNC: 9.4 MG/DL — SIGNIFICANT CHANGE UP (ref 8.4–10.5)
CHLORIDE SERPL-SCNC: 101 MMOL/L — SIGNIFICANT CHANGE UP (ref 96–108)
CO2 SERPL-SCNC: 25 MMOL/L — SIGNIFICANT CHANGE UP (ref 22–31)
CREAT SERPL-MCNC: 1.09 MG/DL — SIGNIFICANT CHANGE UP (ref 0.5–1.3)
EOSINOPHIL # BLD AUTO: 0.1 K/UL — SIGNIFICANT CHANGE UP (ref 0–0.5)
EOSINOPHIL NFR BLD AUTO: 0.6 % — SIGNIFICANT CHANGE UP (ref 0–6)
GLUCOSE SERPL-MCNC: 107 MG/DL — HIGH (ref 70–99)
HCT VFR BLD CALC: 37.4 % — SIGNIFICANT CHANGE UP (ref 34.5–45)
HGB BLD-MCNC: 12.8 G/DL — SIGNIFICANT CHANGE UP (ref 11.5–15.5)
LYMPHOCYTES # BLD AUTO: 19.8 % — SIGNIFICANT CHANGE UP (ref 13–44)
LYMPHOCYTES # BLD AUTO: 2.2 K/UL — SIGNIFICANT CHANGE UP (ref 1–3.3)
MAGNESIUM SERPL-MCNC: 2.2 MG/DL — SIGNIFICANT CHANGE UP (ref 1.6–2.6)
MCHC RBC-ENTMCNC: 29.5 PG — SIGNIFICANT CHANGE UP (ref 27–34)
MCHC RBC-ENTMCNC: 34.2 GM/DL — SIGNIFICANT CHANGE UP (ref 32–36)
MCV RBC AUTO: 86.4 FL — SIGNIFICANT CHANGE UP (ref 80–100)
MONOCYTES # BLD AUTO: 0.7 K/UL — SIGNIFICANT CHANGE UP (ref 0–0.9)
MONOCYTES NFR BLD AUTO: 6.1 % — SIGNIFICANT CHANGE UP (ref 2–14)
NEUTROPHILS # BLD AUTO: 8.1 K/UL — HIGH (ref 1.8–7.4)
NEUTROPHILS NFR BLD AUTO: 73.2 % — SIGNIFICANT CHANGE UP (ref 43–77)
PHOSPHATE SERPL-MCNC: 4.3 MG/DL — SIGNIFICANT CHANGE UP (ref 2.5–4.5)
PLATELET # BLD AUTO: 274 K/UL — SIGNIFICANT CHANGE UP (ref 150–400)
POTASSIUM SERPL-MCNC: 3.9 MMOL/L — SIGNIFICANT CHANGE UP (ref 3.5–5.3)
POTASSIUM SERPL-SCNC: 3.9 MMOL/L — SIGNIFICANT CHANGE UP (ref 3.5–5.3)
PROT SERPL-MCNC: 7.1 G/DL — SIGNIFICANT CHANGE UP (ref 6–8.3)
RBC # BLD: 4.33 M/UL — SIGNIFICANT CHANGE UP (ref 3.8–5.2)
RBC # FLD: 11.7 % — SIGNIFICANT CHANGE UP (ref 10.3–14.5)
SODIUM SERPL-SCNC: 140 MMOL/L — SIGNIFICANT CHANGE UP (ref 135–145)
WBC # BLD: 11 K/UL — HIGH (ref 3.8–10.5)
WBC # FLD AUTO: 11 K/UL — HIGH (ref 3.8–10.5)

## 2017-07-13 PROCEDURE — 99233 SBSQ HOSP IP/OBS HIGH 50: CPT | Mod: GC

## 2017-07-13 RX ORDER — AMLODIPINE BESYLATE 2.5 MG/1
10 TABLET ORAL DAILY
Qty: 0 | Refills: 0 | Status: DISCONTINUED | OUTPATIENT
Start: 2017-07-13 | End: 2017-07-13

## 2017-07-13 RX ORDER — AMLODIPINE BESYLATE 2.5 MG/1
10 TABLET ORAL ONCE
Qty: 0 | Refills: 0 | Status: COMPLETED | OUTPATIENT
Start: 2017-07-13 | End: 2018-06-11

## 2017-07-13 RX ORDER — HYDROCHLOROTHIAZIDE 25 MG
25 TABLET ORAL ONCE
Qty: 0 | Refills: 0 | Status: COMPLETED | OUTPATIENT
Start: 2017-07-13 | End: 2018-06-11

## 2017-07-13 RX ORDER — HYDROCHLOROTHIAZIDE 25 MG
25 TABLET ORAL ONCE
Qty: 0 | Refills: 0 | Status: COMPLETED | OUTPATIENT
Start: 2017-07-13 | End: 2017-07-14

## 2017-07-13 RX ORDER — AMLODIPINE BESYLATE 2.5 MG/1
10 TABLET ORAL ONCE
Qty: 0 | Refills: 0 | Status: COMPLETED | OUTPATIENT
Start: 2017-07-13 | End: 2017-07-13

## 2017-07-13 RX ORDER — AMLODIPINE BESYLATE 2.5 MG/1
10 TABLET ORAL ONCE
Qty: 0 | Refills: 0 | Status: COMPLETED | OUTPATIENT
Start: 2017-07-13 | End: 2017-07-14

## 2017-07-13 RX ORDER — HEPARIN SODIUM 5000 [USP'U]/ML
5000 INJECTION INTRAVENOUS; SUBCUTANEOUS EVERY 12 HOURS
Qty: 0 | Refills: 0 | Status: DISCONTINUED | OUTPATIENT
Start: 2017-07-13 | End: 2017-07-14

## 2017-07-13 RX ORDER — HYDROCHLOROTHIAZIDE 25 MG
25 TABLET ORAL ONCE
Qty: 0 | Refills: 0 | Status: COMPLETED | OUTPATIENT
Start: 2017-07-13 | End: 2017-07-13

## 2017-07-13 RX ADMIN — HEPARIN SODIUM 5000 UNIT(S): 5000 INJECTION INTRAVENOUS; SUBCUTANEOUS at 17:17

## 2017-07-13 RX ADMIN — SIMVASTATIN 40 MILLIGRAM(S): 20 TABLET, FILM COATED ORAL at 21:23

## 2017-07-13 RX ADMIN — Medication 25 MILLIGRAM(S): at 16:08

## 2017-07-13 RX ADMIN — SENNA PLUS 2 TABLET(S): 8.6 TABLET ORAL at 21:23

## 2017-07-13 RX ADMIN — HEPARIN SODIUM 5000 UNIT(S): 5000 INJECTION INTRAVENOUS; SUBCUTANEOUS at 06:30

## 2017-07-13 RX ADMIN — Medication 300 MILLIGRAM(S): at 21:24

## 2017-07-13 RX ADMIN — Medication 100 MILLIGRAM(S): at 15:08

## 2017-07-13 RX ADMIN — Medication 100 MILLIGRAM(S): at 21:23

## 2017-07-13 RX ADMIN — Medication 100 MILLIGRAM(S): at 06:30

## 2017-07-13 RX ADMIN — AMLODIPINE BESYLATE 10 MILLIGRAM(S): 2.5 TABLET ORAL at 16:07

## 2017-07-13 RX ADMIN — ESCITALOPRAM OXALATE 10 MILLIGRAM(S): 10 TABLET, FILM COATED ORAL at 11:35

## 2017-07-13 RX ADMIN — MONTELUKAST 10 MILLIGRAM(S): 4 TABLET, CHEWABLE ORAL at 11:35

## 2017-07-14 VITALS — HEART RATE: 87 BPM | SYSTOLIC BLOOD PRESSURE: 145 MMHG | DIASTOLIC BLOOD PRESSURE: 86 MMHG

## 2017-07-14 DIAGNOSIS — R94.31 ABNORMAL ELECTROCARDIOGRAM [ECG] [EKG]: ICD-10-CM

## 2017-07-14 LAB
ANION GAP SERPL CALC-SCNC: 15 MMOL/L — SIGNIFICANT CHANGE UP (ref 5–17)
BASOPHILS # BLD AUTO: 0.1 K/UL — SIGNIFICANT CHANGE UP (ref 0–0.2)
BASOPHILS NFR BLD AUTO: 0.5 % — SIGNIFICANT CHANGE UP (ref 0–2)
BUN SERPL-MCNC: 19 MG/DL — SIGNIFICANT CHANGE UP (ref 7–23)
CALCIUM SERPL-MCNC: 9.7 MG/DL — SIGNIFICANT CHANGE UP (ref 8.4–10.5)
CHLORIDE SERPL-SCNC: 99 MMOL/L — SIGNIFICANT CHANGE UP (ref 96–108)
CO2 SERPL-SCNC: 27 MMOL/L — SIGNIFICANT CHANGE UP (ref 22–31)
CREAT SERPL-MCNC: 1.05 MG/DL — SIGNIFICANT CHANGE UP (ref 0.5–1.3)
EOSINOPHIL # BLD AUTO: 0.1 K/UL — SIGNIFICANT CHANGE UP (ref 0–0.5)
EOSINOPHIL NFR BLD AUTO: 1 % — SIGNIFICANT CHANGE UP (ref 0–6)
GLUCOSE SERPL-MCNC: 108 MG/DL — HIGH (ref 70–99)
HCT VFR BLD CALC: 41.1 % — SIGNIFICANT CHANGE UP (ref 34.5–45)
HGB BLD-MCNC: 13.7 G/DL — SIGNIFICANT CHANGE UP (ref 11.5–15.5)
LYMPHOCYTES # BLD AUTO: 2.6 K/UL — SIGNIFICANT CHANGE UP (ref 1–3.3)
LYMPHOCYTES # BLD AUTO: 23.5 % — SIGNIFICANT CHANGE UP (ref 13–44)
MAGNESIUM SERPL-MCNC: 2 MG/DL — SIGNIFICANT CHANGE UP (ref 1.6–2.6)
MCHC RBC-ENTMCNC: 28.8 PG — SIGNIFICANT CHANGE UP (ref 27–34)
MCHC RBC-ENTMCNC: 33.3 GM/DL — SIGNIFICANT CHANGE UP (ref 32–36)
MCV RBC AUTO: 86.3 FL — SIGNIFICANT CHANGE UP (ref 80–100)
MONOCYTES # BLD AUTO: 0.7 K/UL — SIGNIFICANT CHANGE UP (ref 0–0.9)
MONOCYTES NFR BLD AUTO: 6.6 % — SIGNIFICANT CHANGE UP (ref 2–14)
NEUTROPHILS # BLD AUTO: 7.7 K/UL — HIGH (ref 1.8–7.4)
NEUTROPHILS NFR BLD AUTO: 68.4 % — SIGNIFICANT CHANGE UP (ref 43–77)
PHOSPHATE SERPL-MCNC: 4.4 MG/DL — SIGNIFICANT CHANGE UP (ref 2.5–4.5)
PLATELET # BLD AUTO: 297 K/UL — SIGNIFICANT CHANGE UP (ref 150–400)
POTASSIUM SERPL-MCNC: 3.5 MMOL/L — SIGNIFICANT CHANGE UP (ref 3.5–5.3)
POTASSIUM SERPL-SCNC: 3.5 MMOL/L — SIGNIFICANT CHANGE UP (ref 3.5–5.3)
RBC # BLD: 4.77 M/UL — SIGNIFICANT CHANGE UP (ref 3.8–5.2)
RBC # FLD: 11.7 % — SIGNIFICANT CHANGE UP (ref 10.3–14.5)
SODIUM SERPL-SCNC: 141 MMOL/L — SIGNIFICANT CHANGE UP (ref 135–145)
WBC # BLD: 11.2 K/UL — HIGH (ref 3.8–10.5)
WBC # FLD AUTO: 11.2 K/UL — HIGH (ref 3.8–10.5)

## 2017-07-14 PROCEDURE — 96375 TX/PRO/DX INJ NEW DRUG ADDON: CPT

## 2017-07-14 PROCEDURE — 80053 COMPREHEN METABOLIC PANEL: CPT

## 2017-07-14 PROCEDURE — 93005 ELECTROCARDIOGRAM TRACING: CPT

## 2017-07-14 PROCEDURE — 70450 CT HEAD/BRAIN W/O DYE: CPT

## 2017-07-14 PROCEDURE — 82330 ASSAY OF CALCIUM: CPT

## 2017-07-14 PROCEDURE — 82553 CREATINE MB FRACTION: CPT

## 2017-07-14 PROCEDURE — 82088 ASSAY OF ALDOSTERONE: CPT

## 2017-07-14 PROCEDURE — 85014 HEMATOCRIT: CPT

## 2017-07-14 PROCEDURE — 80048 BASIC METABOLIC PNL TOTAL CA: CPT

## 2017-07-14 PROCEDURE — 71045 X-RAY EXAM CHEST 1 VIEW: CPT

## 2017-07-14 PROCEDURE — 93975 VASCULAR STUDY: CPT

## 2017-07-14 PROCEDURE — 81001 URINALYSIS AUTO W/SCOPE: CPT

## 2017-07-14 PROCEDURE — 85027 COMPLETE CBC AUTOMATED: CPT

## 2017-07-14 PROCEDURE — 85610 PROTHROMBIN TIME: CPT

## 2017-07-14 PROCEDURE — 83735 ASSAY OF MAGNESIUM: CPT

## 2017-07-14 PROCEDURE — 84443 ASSAY THYROID STIM HORMONE: CPT

## 2017-07-14 PROCEDURE — 84484 ASSAY OF TROPONIN QUANT: CPT

## 2017-07-14 PROCEDURE — 82803 BLOOD GASES ANY COMBINATION: CPT

## 2017-07-14 PROCEDURE — 84132 ASSAY OF SERUM POTASSIUM: CPT

## 2017-07-14 PROCEDURE — 83880 ASSAY OF NATRIURETIC PEPTIDE: CPT

## 2017-07-14 PROCEDURE — 84244 ASSAY OF RENIN: CPT

## 2017-07-14 PROCEDURE — 96374 THER/PROPH/DIAG INJ IV PUSH: CPT

## 2017-07-14 PROCEDURE — 83036 HEMOGLOBIN GLYCOSYLATED A1C: CPT

## 2017-07-14 PROCEDURE — 82947 ASSAY GLUCOSE BLOOD QUANT: CPT

## 2017-07-14 PROCEDURE — 82435 ASSAY OF BLOOD CHLORIDE: CPT

## 2017-07-14 PROCEDURE — 84295 ASSAY OF SERUM SODIUM: CPT

## 2017-07-14 PROCEDURE — 82550 ASSAY OF CK (CPK): CPT

## 2017-07-14 PROCEDURE — 85730 THROMBOPLASTIN TIME PARTIAL: CPT

## 2017-07-14 PROCEDURE — 99285 EMERGENCY DEPT VISIT HI MDM: CPT | Mod: 25

## 2017-07-14 PROCEDURE — 71046 X-RAY EXAM CHEST 2 VIEWS: CPT

## 2017-07-14 PROCEDURE — 84100 ASSAY OF PHOSPHORUS: CPT

## 2017-07-14 PROCEDURE — 83605 ASSAY OF LACTIC ACID: CPT

## 2017-07-14 PROCEDURE — 99239 HOSP IP/OBS DSCHRG MGMT >30: CPT

## 2017-07-14 PROCEDURE — 85652 RBC SED RATE AUTOMATED: CPT

## 2017-07-14 PROCEDURE — 93010 ELECTROCARDIOGRAM REPORT: CPT

## 2017-07-14 RX ORDER — METOPROLOL TARTRATE 50 MG
1 TABLET ORAL
Qty: 60 | Refills: 0 | OUTPATIENT
Start: 2017-07-14 | End: 2017-08-13

## 2017-07-14 RX ORDER — HYDRALAZINE HCL 50 MG
0 TABLET ORAL
Qty: 0 | Refills: 0 | COMMUNITY

## 2017-07-14 RX ORDER — DICLOFENAC SODIUM 75 MG/1
1 TABLET, DELAYED RELEASE ORAL
Qty: 0 | Refills: 0 | COMMUNITY

## 2017-07-14 RX ORDER — CYCLOBENZAPRINE HYDROCHLORIDE 10 MG/1
0 TABLET, FILM COATED ORAL
Qty: 0 | Refills: 0 | COMMUNITY

## 2017-07-14 RX ORDER — VERAPAMIL HCL 240 MG
180 CAPSULE, EXTENDED RELEASE PELLETS 24 HR ORAL
Qty: 0 | Refills: 0 | COMMUNITY

## 2017-07-14 RX ORDER — PREGABALIN 225 MG/1
0 CAPSULE ORAL
Qty: 0 | Refills: 0 | COMMUNITY

## 2017-07-14 RX ORDER — MONTELUKAST 4 MG/1
1 TABLET, CHEWABLE ORAL
Qty: 0 | Refills: 0 | COMMUNITY

## 2017-07-14 RX ORDER — ALPRAZOLAM 0.25 MG
0 TABLET ORAL
Qty: 0 | Refills: 0 | COMMUNITY

## 2017-07-14 RX ORDER — LABETALOL HCL 100 MG
1 TABLET ORAL
Qty: 0 | Refills: 0 | COMMUNITY

## 2017-07-14 RX ADMIN — MONTELUKAST 10 MILLIGRAM(S): 4 TABLET, CHEWABLE ORAL at 12:27

## 2017-07-14 RX ADMIN — ESCITALOPRAM OXALATE 10 MILLIGRAM(S): 10 TABLET, FILM COATED ORAL at 12:27

## 2017-07-14 RX ADMIN — Medication 100 MILLIGRAM(S): at 06:03

## 2017-07-14 RX ADMIN — HEPARIN SODIUM 5000 UNIT(S): 5000 INJECTION INTRAVENOUS; SUBCUTANEOUS at 06:04

## 2017-07-14 RX ADMIN — AMLODIPINE BESYLATE 10 MILLIGRAM(S): 2.5 TABLET ORAL at 06:03

## 2017-07-14 RX ADMIN — Medication 25 MILLIGRAM(S): at 06:03

## 2017-07-14 NOTE — PROGRESS NOTE ADULT - ATTENDING COMMENTS
She feels improved and  her symptoms were completely resolved as she denied CP , SOB, Headache or blurred vision. SBPs in 150s. will resume her amlodipine and HCTZ and monitor BP. If remains stable likely d/c in next 24 hour.
She feels improved and  her symptoms were completely resolved, NO CP , SOB, Headache or blurred vision.. Amlodipine and HCTZ are restarted  BP is acceptable. . Will confirm regarding metoprolol as her home med,  and if she was on that consider resuming it. Will recheck ECG for QTC as it was slightly elevated before and if ok d/c planning.  Very mild leukocytosis which seems to be stable as she has no pulmonary or urinary symptoms . no headache or blurred vision. exam non focal. likely reactive and to recheck as an out patient.   Likely her headache and visual symptoms were  secondary to hypertensive urgency and now symptoms are totally resolved.
I have seen the patient with the team in [place] , at this time her symptoms were completely resolved and she has no temporal tenderness. symptoms were likely secondary to hyeprtenisve urgency. Monitor BP closely

## 2017-07-14 NOTE — PROGRESS NOTE ADULT - PROBLEM SELECTOR PLAN 1
-Blood pressures currently at goal, systolic 120's-150's.  -all BP meds currently being held, thus it is unclear why she was persistently hypertensive at home and office visits while on 4 meds. Will attempt to restart one med tonight and monitor overnight. She will need close outpatient follow up to finalize the regimen. In the meantime we are looking for causes of HTN. Renal U/S showed no evidence NICOLLE, renin and giacomo levels pending, TSH wnl.
-Blood pressures currently at goal, systolic 120's-150's.  -patient was restarted on amlodipine 10 mg and hydrochlorthiazide 25 mg yesterday afternoon, and then again this AM. Blood pressures overall well controlled, one high reading w/ 's. Unclear why she is exhibiting this lability with BP's. Plan for her to see PCP Dr. Syed Robledo early next week, Mon or Tues for BP check and to re-adjust meds.   -Renal U/S shows no evidence NICOLLE  -aldosterone level normal  renin level pending  -TSH wnl  -patient not on drugs that would cause 2ndary HTN
Patient still with BP's 170's-180's/80's-90's earlier this AM, however now with hypotension to 75/42, s/p NS bolus  -all BP meds currently held. Will add back medications at BP allows.   -f/u renal u/s scheduled for today to rule out renal artery stenosis and f/u renin/giacomo levels

## 2017-07-14 NOTE — DISCHARGE NOTE ADULT - HOSPITAL COURSE
The patient is a 61 yo F w/ hx HTN, HLD, anxiety, depression, asthma, p/w acute onset headache and blurry vision. Yesterday in the afternoon the patient was out shopping when she suddenly felt intense 10/10 pain in the R. temporal area of her head, associated with suddeno onset R. eye blurry vision, as well as palpitations. She felt dizzy and nauseous. The pain was intermittent but was severe with each episode. She did not take anything for pain. She went out to eat with her  later that night and was unable to eat due to the pain and nausea. She denies jaw pain, fever, chills, chest pain, shortness of breath, abdominal pain, vomiting or diarrhea. No recent travels. No recent changes to her medications. Patient states she has a history of high blood pressure, but has never had headaches like this and has never had to be hospitalized for this issue. Upon admission the patient denied any blurriness or headache.      In the ED VS: Tm 98.6, /110, R 18, 100% RA. She recieved hydralazine 10 mg IVP + morphine 4 mg IV.    On the morning following admission at 5 AM the patient received hydralazine 20 mg PO, hydrochlorthiazide 25 mg PO, labetalol 400 mg PO and verapamil 180 mg po. A few hours later, the patient was getting out of bed (8:04 am) and then stated to the PCA  "I am not feeling right," Speech reportedly became  incoherent and garbled with some floundering of upper extremities.  BP at this time was 102/50 while lying down. A code stroke called at 8:06am. Repeat BP was 89/50.  Pt regained speech and controlled movements in her extremities by 8:10am. CT scan was performed which did not show evidence of intracranial mass or bleed. All blood pressure medications were held for the remainder of the day, and the patient had controlled blood pressures around 120's systolic. A renal ultrasound was performed which did not show any evidence of renal artery stenosis. The patient had renin and aldosterone levels checked as well as TSH level; all were normal. The following day, the patient began to have blood pressure readings with systolic 150's. Amlodipine 10 mg and hydrochlorthiazide 25 mg were given in the afternoon, and again the following AM. The patient continued to have controlled blood pressures and denied any headache, blurry vision, nausea or vomiting. She was discharged with an appointment to see her primary care doctor the following Tuesday for a blood pressure check and medication adjustment.

## 2017-07-14 NOTE — PROGRESS NOTE ADULT - SUBJECTIVE AND OBJECTIVE BOX
PGY-1 Accept Note    CC: headache, blurry vision.    HPI: 63 yo F w/ hx HTN, HLD, anxiety, depression, asthma, p/w acute onset headache and blurry vision. Yesterday in the afternoon the patient was out shopping when she suddenly felt intense 10/10 pain in the R. temporal area of her head, associated with suddeno onset R. eye blurry vision, as well as palpitations. She felt dizzy and nauseous. The pain was intermittent but was severe with each episode. She did not take anything for pain. She went out to eat with her  later that night and was unable to eat due to the pain and nausea. She denies jaw pain, fever, chills, chest pain, shortness of breath, abdominal pain, vomiting or diarrhea. No recent travels. No recent changes to her medications. Patient states she has a history of high blood pressure, but has never had headaches like this and has never had to be hospitalized for this issue. Today, the patient denies any blurriness or headache.      In the ED VS: Tm 98.6, /110, R 18, 100% RA. She recieved hydralazine 10 mg IVP + morphine 4 mg IV.    This morning at 5 AM the patient received hydralazine 20 mg PO, hydrochlorthiazide 25 mg PO, labetalol 400 mg PO and verapamil 180 mg po.     The patient was interviewed and examined at 7:30 am and stated that the blurry vision and headache have completely resolved. She was hungry. She denied CP/SOB/dizziness.    Update: 15 minutes following the time of this H&P, the patient was getting out of bed (8:04 am) and then stated to the PCA  "I am not feeling right," Speech reportedly became  incoherent and garbled with some floundering of upper extremities.  BP at this time was 102/50 while lying down. A code stroke called at 8:06am. Repeat BP was 89/50.  Pt regained speech and controlled movements in her extremities by 8:10am. CT scan was performed which did not show evidence of intracranial mass or bleed.       MEDICATIONS  (STANDING):  escitalopram 10 milliGRAM(s) Oral daily  traZODone 300 milliGRAM(s) Oral at bedtime  simvastatin 40 milliGRAM(s) Oral at bedtime  montelukast 10 milliGRAM(s) Oral daily  heparin  Injectable 5000 Unit(s) SubCutaneous every 8 hours  docusate sodium 100 milliGRAM(s) Oral three times a day    MEDICATIONS  (PRN):  senna 2 Tablet(s) Oral at bedtime PRN Constipation      Vital Signs Last 24 Hrs  T(C): 36.6 (17 @ 05:10), Max: 37 (17 @ 19:15)  HR: 65 (17 @ 08:06) (65 - 97)  BP: 75/42 (17 @ 08:06) (75/42 - 206/110)  RR: 18 (17 @ 08:06) (14 - 20)  SpO2: 99% (17 @ 08:06) (97% - 100%)    PHYSICAL EXAM  GENERAL: NAD, well-developed  NEURO: AO x3, PERRLA, EOMI, motor strength in tact in 4/4 extremities, sensation in tact  HEAD:  Atraumatic, Normocephalic  EYES: conjunctiva and sclera clear  NECK: Supple, No JVD, no lymphadenopathy, no thyromegaly  CHEST/LUNG: Clear to auscultation bilaterally; No wheezes, rales or rhonchi  HEART: Regular rate and rhythm; No murmurs, rubs, or gallops  ABDOMEN: Soft, Nontender, Nondistended; Bowel sounds present, no masses.  EXTREMITIES:  2+ Peripheral Pulses, No clubbing, cyanosis, or edema  SKIN: Warm, dry, in tact, no rashes or lesions  PSYCH: affect appropriate    LABS:                        12.4   11.0  )-----------( 261      ( 2017 08:22 )             36.1     -    143  |  102  |  11  ----------------------------<  104<H>  3.4<L>   |  26  |  0.80    Ca    9.1      2017 05:27  Phos  3.7     -  Mg     2.2         TPro  7.4  /  Alb  3.9  /  TBili  0.6  /  DBili  x   /  AST  19  /  ALT  18  /  AlkPhos  115  12    PT/INR - ( 2017 08:22 )   PT: 11.6 sec;   INR: 1.06 ratio         PTT - ( 2017 08:22 )  PTT:33.1 sec  CARDIAC MARKERS ( 2017 08:22 )  x     / <0.01 ng/mL / 77 U/L / x     / x      CARDIAC MARKERS ( 2017 20:28 )  x     / <0.01 ng/mL / 140 U/L / x     / 3.1 ng/mL      Urinalysis Basic - ( 2017 23:46 )    Color: x / Appearance: Clear / S.010 / pH: x  Gluc: x / Ketone: Negative  / Bili: Negative / Urobili: Negative   Blood: x / Protein: Negative / Nitrite: Negative   Leuk Esterase: Trace / RBC: 0-2 /HPF / WBC 3-5 /HPF   Sq Epi: x / Non Sq Epi: OCC /HPF / Bacteria: x      PT/INR - ( 2017 08:22 )   PT: 11.6 sec;   INR: 1.06 ratio         PTT - ( 2017 08:22 )  PTT:33.1 sec  I&O's Summary    2017 07:  -  2017 07:00  --------------------------------------------------------  IN: 240 mL / OUT: 0 mL / NET: 240 mL    2017 07:01  -  2017 09:23  --------------------------------------------------------  IN: 0 mL / OUT: 0 mL / NET: 0 mL      CRISTINA Santana MD  PGY1  p#[phone]
Patient is a 62y old  Female who presents with a chief complaint of headache, blurry vision (2017 02:43)      SUBJECTIVE / OVERNIGHT EVENTS: No overnight events. No complaints this AM. Patient denies CP, SOB, HA, blurry vision, N/V/D/V, fever. No weakness or palpitations.    On tele: NSR 70's-80's    MEDICATIONS  (STANDING):  escitalopram 10 milliGRAM(s) Oral daily  traZODone 300 milliGRAM(s) Oral at bedtime  simvastatin 40 milliGRAM(s) Oral at bedtime  montelukast 10 milliGRAM(s) Oral daily  heparin  Injectable 5000 Unit(s) SubCutaneous every 8 hours  docusate sodium 100 milliGRAM(s) Oral three times a day    MEDICATIONS  (PRN):  senna 2 Tablet(s) Oral at bedtime PRN Constipation      Vital Signs Last 24 Hrs  T(C): 36.6 (17 @ 12:00), Max: 36.9 (17 @ 21:14)  HR: 82 (17 @ 12:00) (71 - 82)  BP: 150/74 (17 @ 12:00) (111/58 - 150/74)  RR: 17 (17 @ 12:00) (16 - 17)  SpO2: 99% (17 @ 12:00) (98% - 99%)    PHYSICAL EXAM  GENERAL: NAD, well-developed  NEURO: AO x3, PERRLA, EOMI, motor strength in tact in 4/4 extremities, sensation in tact  HEAD:  Atraumatic, Normocephalic  EYES: conjunctiva and sclera clear  NECK: Supple, No JVD, no lymphadenopathy, no thyromegaly  CHEST/LUNG: Clear to auscultation bilaterally; No wheezes, rales or rhonchi  HEART: Regular rate and rhythm; No murmurs, rubs, or gallops  ABDOMEN: Soft, Nontender, Nondistended; Bowel sounds present, no masses.  EXTREMITIES:  2+ Peripheral Pulses, No clubbing, cyanosis, or edema  SKIN: Warm, dry, in tact, no rashes or lesions  PSYCH: affect appropriate    LABS:                        12.8   11.0  )-----------( 274      ( 2017 05:12 )             37.4     07-13    140  |  101  |  20  ----------------------------<  107<H>  3.9   |  25  |  1.09    Ca    9.4      2017 05:16  Phos  4.3     13  Mg     2.2     13    TPro  7.1  /  Alb  3.8  /  TBili  0.7  /  DBili  x   /  AST  15  /  ALT  15  /  AlkPhos  107  07-13    PT/INR - ( 2017 08:22 )   PT: 11.6 sec;   INR: 1.06 ratio         PTT - ( 2017 08:22 )  PTT:33.1 sec  CARDIAC MARKERS ( 2017 08:22 )  x     / <0.01 ng/mL / 77 U/L / x     / 2.0 ng/mL  CARDIAC MARKERS ( 2017 20:28 )  x     / <0.01 ng/mL / 140 U/L / x     / 3.1 ng/mL    Urinalysis Basic - ( 2017 23:46 )    Color: x / Appearance: Clear / S.010 / pH: x  Gluc: x / Ketone: Negative  / Bili: Negative / Urobili: Negative   Blood: x / Protein: Negative / Nitrite: Negative   Leuk Esterase: Trace / RBC: 0-2 /HPF / WBC 3-5 /HPF   Sq Epi: x / Non Sq Epi: OCC /HPF / Bacteria: x      PT/INR - ( 2017 08:22 )   PT: 11.6 sec;   INR: 1.06 ratio         PTT - ( 2017 08:22 )  PTT:33.1 sec  I&O's Summary    2017 07:  -  2017 07:00  --------------------------------------------------------  IN: 720 mL / OUT: 600 mL / NET: 120 mL    2017 07:01  -  2017 13:41  --------------------------------------------------------  IN: 240 mL / OUT: 0 mL / NET: 240 mL      A Esther HUERTA  PGY1  p#[phone]
Patient is a 62y old  Female who presents with a chief complaint of headache, blurry vision (12 Jul 2017 02:43)      SUBJECTIVE / OVERNIGHT EVENTS: No overnight events. No complaints this AM. Patient denies CP, SOB, HA. head pain, N/V No blurry vision  On tele:    MEDICATIONS  (STANDING):  escitalopram 10 milliGRAM(s) Oral daily  traZODone 300 milliGRAM(s) Oral at bedtime  simvastatin 40 milliGRAM(s) Oral at bedtime  montelukast 10 milliGRAM(s) Oral daily  docusate sodium 100 milliGRAM(s) Oral three times a day  heparin  Injectable 5000 Unit(s) SubCutaneous every 12 hours    MEDICATIONS  (PRN):  senna 2 Tablet(s) Oral at bedtime PRN Constipation      Vital Signs Last 24 Hrs  T(C): 36.6 (07-14-17 @ 04:00), Max: 36.9 (07-13-17 @ 16:00)  HR: 86 (07-14-17 @ 08:00) (82 - 87)  BP: 148/75 (07-14-17 @ 08:00) (125/62 - 167/78)  RR: 17 (07-14-17 @ 08:00) (16 - 17)  SpO2: 99% (07-14-17 @ 08:00) (97% - 99%)    PHYSICAL EXAM  GENERAL: NAD, well-developed  NEURO: AO x3, PERRLA, EOMI, motor strength in tact in 4/4 extremities, sensation in tact  HEAD:  Atraumatic, Normocephalic  EYES: conjunctiva and sclera clear  NECK: Supple, No JVD, no lymphadenopathy, no thyromegaly  CHEST/LUNG: Clear to auscultation bilaterally; No wheezes, rales or rhonchi  HEART: Regular rate and rhythm; No murmurs, rubs, or gallops  ABDOMEN: Soft, Nontender, Nondistended; Bowel sounds present, no masses.  EXTREMITIES:  2+ Peripheral Pulses, No clubbing, cyanosis, or edema  SKIN: Warm, dry, in tact, no rashes or lesions  PSYCH: affect appropriate    LABS:                        13.7   11.2  )-----------( 297      ( 14 Jul 2017 04:33 )             41.1     07-14    141  |  99  |  19  ----------------------------<  108<H>  3.5   |  27  |  1.05    Ca    9.7      14 Jul 2017 04:33  Phos  4.4     07-14  Mg     2.0     07-14    TPro  7.1  /  Alb  3.8  /  TBili  0.7  /  DBili  x   /  AST  15  /  ALT  15  /  AlkPhos  107  07-13      I&O's Summary    13 Jul 2017 07:01  -  14 Jul 2017 07:00  --------------------------------------------------------  IN: 1200 mL / OUT: 0 mL / NET: 1200 mL      CRISTINA Santana MD  PGY1  p#[phone]

## 2017-07-14 NOTE — DISCHARGE NOTE ADULT - INSTRUCTIONS
The DASH diet recommendation includes: 2000 calories a day of: Whole grains (6 to 8 servings a day), Vegetables (4 to 5 servings a day, Fruits (4 to 5 servings a day), Low-fat or fat-free milk and milk products (2 to 3 servings a day)., Lean meat, poultry, and fish (6 or fewer servings a day), Nuts, seeds, and beans (4 to 5 servings a week), Healthy fats and oils (2 to 3 servings a day), Sweets, preferably low-fat or fat-free (5 or fewer a week),Sodium (no more than 2,300 mg a day).    If you drink alcohol, limit yourself to 2 drinks or less per day for men and 1 drink or less per day for women. To reduce your blood pressure even more, replace some DASH diet carbohydrates with low-fat protein and unsaturated fats.  For weight loss, reduce your daily calories to 1,600 per day.    Ways to cut back on salt:-Limit canned, dried, packaged, and fast foods.  Don't add salt to your food at the table. Season foods with herbs instead of salt when you cook. Request no added salt when you go to a restaurant.

## 2017-07-14 NOTE — PROGRESS NOTE ADULT - ASSESSMENT
62F c hx resistant htn, hld, anxiety, depression, asthma, pw hypertensive urgency, s/p hypotensive episode yesterday, now w/ controlled BP's on 2/4 home antihypertensive meds.
62F c hx resistant htn, hld, anxiety, depression, asthma, pw hypertensive urgency, s/p hypotensive episode yesterday, now w/ controlled BP's.
63 yo F w/ hx HTN, HLD, anxiety, depression, asthma, p/w acute onset headache and blurry vision 2/2 hypertensive urgency.

## 2017-07-14 NOTE — DISCHARGE NOTE ADULT - PROVIDER TOKENS
FREE:[LAST:[Venkat],FIRST:[Syed],PHONE:[(   )    -],FAX:[(   )    -],ADDRESS:[phone] [id number] [place] [address]]]

## 2017-07-14 NOTE — PROGRESS NOTE ADULT - PROBLEM SELECTOR PLAN 4
ms-->511 ms on repeat EKG this am  Not on meds that would cause this other than escitalopram + trazodone which is home med and not new, will avoid prolonging drugs going forward, patient instructed to f/u with her psychiatrist to monitor EKG on these meds
-HSQ 5000 u q12 for DVT ppx  -diet: DASH/TLC regular  -dispo: home
-lovenox 40 mg q d  -diet: NPO until renal u/s, then dash/TLC

## 2017-07-14 NOTE — DISCHARGE NOTE ADULT - PATIENT PORTAL LINK FT
“You can access the Follow[employer] Patient Portal, offered by [facility], by registering with the following website: [email or web address]”

## 2017-07-14 NOTE — PROGRESS NOTE ADULT - PROBLEM SELECTOR PLAN 3
-c/w simvastatin
-lipid panel was ordered, monika Gallagherc/w simvastatin
-will continue statin  -f/u lipid panel

## 2017-07-14 NOTE — DISCHARGE NOTE ADULT - PLAN OF CARE
You have high blood pressure (also called hypertension), and presented to the hospital with a blood pressure of 206/110, which is very elevated. You also had a headache, blurry vision, and heart palpitations when this occurred. In this hospital you were treated with several blood pressure medications, but you became hypotensive (blood pressure too low) and some had to be stopped. Your blood pressure is currently well controlled, however you will have to follow up regularly with your PCP for frequent BP checks so that your medication can be adjusted as needed. You have an appointment to see Dr. Robledo at 11:40 pm on Tuesday, July 18.    High blood pressure usually has no symptoms, but over time, it can damage your heart, blood vessels, eyes, kidneys, and other organs. With help from your doctor, you can manage your blood pressure and protect your health. Take your blood pressure medicine exactly as directed. Don't skip doses. Missing doses can cause your blood pressure to get out of control. If you do miss a dose (or doses) check with your healthcare provider about what to do. Avoid medicine that contain heart stimulants, including over-the-counter drugs. Check for warnings about high blood pressure on the label. Ask the pharmacist before purchasing something you haven't used before. Check with your doctor or pharmacist before taking a decongestant. Some decongestants can worsen high blood pressure.    Please return to the hospital if you experience any of the following:  You take your blood pressure and it is 180/110 or higher.  You have a severe headache.  You have chest pain or shortness of breath.  You have weakness or numbness in your face, arms, or legs.  You cannot see or talk as well as usual. prevent future episode resolution When you came to the hospital, you were experiencing blurry vision. Once we controlled your blood pressure, your vision returned to normal within hours. If you experience another episode of blurry vision, please return to your ophthamologist for immediate evaluation. If the blurry vision is associated with headache, nasuea/vomiting, jaw pain, or fever, please seek immediate medical attention. Hyperlipidemia is a high level of lipids (fats) in your blood. These lipids include cholesterol or triglycerides. Lipids are made by your body. They also come from the foods you eat. Your body needs lipids to work properly, but high levels increase your risk for heart disease, heart attack, and stroke.  To help lower your cholesterol, please continue to take your simvastatin 40 mg daily as prescribed. Exercise lowers your cholesterol levels and helps you maintain a healthy weight. Get 40 minutes or more of moderate exercise 3 to 4 days each week. Examples of moderate exercises include walking briskly, swimming, or riding a bike. .Do not smoke. Nicotine and other chemicals in cigarettes and cigars can increase your risk for a heart attack and stroke. Eat heart-healthy foods and decrease the total amount of fat you eat. Choose lean meats, fat-free or 1% fat milk, and low-fat dairy products, such as yogurt and cheese. Limit or do not eat red meat. Red meats are high in fat and cholesterol. On your EKG on 7/14, you had an abnormality called a prolonged QTc interval. This can happen for many reasons, but can be caused by some of your medications, such as trazodone and escitalopram. Please see your PCP within one week of discharge to have a repeat EKG to see if this abnormality is still found. You may have to have your medications adjusted.

## 2017-07-14 NOTE — DISCHARGE NOTE ADULT - MEDICATION SUMMARY - MEDICATIONS TO STOP TAKING
I will STOP taking the medications listed below when I get home from the hospital:    Lexapro 5 mg oral tablet  -- 2 tab(s) by mouth once a day    Flexeril  --   once a day (at bedtime)    labetalol 200 mg oral tablet  -- 1 tab(s) by mouth 2 times a day    ALPRAZolam 1 mg oral tablet  --  by mouth , As Needed    buPROPion 150 mg/24 hours (XL) oral tablet, extended release  -- 1 tab(s) by mouth every 24 hours    verapamil 24 hour extended release  -- 180 milligram(s) by mouth    sertraline 50 mg oral tablet  -- 1 tab(s) by mouth once a day    cyanocobalamin 1000 mcg/mL injectable solution  --  injectable once a month    hydrALAZINE 10 mg oral tablet  --  by mouth 3 times a day    labetalol 300 mg oral tablet  -- 1 tab(s) by mouth 2 times a day    meloxicam 10 mg oral capsule  --  by mouth    montelukast 10 mg oral tablet  -- 1 tab(s) by mouth once a day    metoprolol tartrate 50 mg oral tablet  -- 1 tab(s) by mouth 2 times a day    promethazine 6.25 mg/5 mL oral syrup  --  by mouth , As Needed I will STOP taking the medications listed below when I get home from the hospital:    Lexapro 5 mg oral tablet  -- 2 tab(s) by mouth once a day    Flexeril  --   once a day (at bedtime)    labetalol 200 mg oral tablet  -- 1 tab(s) by mouth 2 times a day    ALPRAZolam 1 mg oral tablet  --  by mouth , As Needed    buPROPion 150 mg/24 hours (XL) oral tablet, extended release  -- 1 tab(s) by mouth every 24 hours    verapamil 24 hour extended release  -- 180 milligram(s) by mouth    sertraline 50 mg oral tablet  -- 1 tab(s) by mouth once a day    hydrALAZINE 10 mg oral tablet  --  by mouth 3 times a day    labetalol 300 mg oral tablet  -- 1 tab(s) by mouth 2 times a day    meloxicam 10 mg oral capsule  --  by mouth    montelukast 10 mg oral tablet  -- 1 tab(s) by mouth once a day    promethazine 6.25 mg/5 mL oral syrup  --  by mouth , As Needed

## 2017-07-14 NOTE — PROGRESS NOTE ADULT - PROBLEM SELECTOR PLAN 2
- Blurry vision resolved >48 hours  -pt reportedly saw ophtho 2 weeks ago  -not consistent with [facility].
- Blurry vision resolved >48 hours  -pt reportedly saw ophtho 2 weeks ago  -not consistent with GCA.
- pt reportedly saw ophtho 2 weeks ago who prescribed a new prescription for glasses  -GCA  considered due to elevated ESR 53 + R. temporal headache and R. blurry vision, however all of these symptoms resolved when BP was lowered. Will monitor closely for now with low threshold to start corticosteroids. CT head with no evidence vasculitis.  -no scleral injection seen on exam or subjective pain at this time to suggest glaucoma  -If blurry vision recurs will get ophtho exam, as uncontrolled hypertension may lead to hypertensive retinopathy  - less likely GCA, cont to monitor

## 2017-07-14 NOTE — DISCHARGE NOTE ADULT - CARE PLAN
Principal Discharge DX:	Hypertensive urgency  Instructions for follow-up, activity and diet:	You have high blood pressure (also called hypertension), and presented to the hospital with a blood pressure of 206/110, which is very elevated. You also had a headache, blurry vision, and heart palpitations when this occurred. In this hospital you were treated with several blood pressure medications, but you became hypotensive (blood pressure too low) and some had to be stopped. Your blood pressure is currently well controlled, however you will have to follow up regularly with your PCP for frequent BP checks so that your medication can be adjusted as needed. You have an appointment to see Dr. Robledo at 11:40 pm on Tuesday, July 18.    High blood pressure usually has no symptoms, but over time, it can damage your heart, blood vessels, eyes, kidneys, and other organs. With help from your doctor, you can manage your blood pressure and protect your health. Take your blood pressure medicine exactly as directed. Don't skip doses. Missing doses can cause your blood pressure to get out of control. If you do miss a dose (or doses) check with your healthcare provider about what to do. Avoid medicine that contain heart stimulants, including over-the-counter drugs. Check for warnings about high blood pressure on the label. Ask the pharmacist before purchasing something you haven't used before. Check with your doctor or pharmacist before taking a decongestant. Some decongestants can worsen high blood pressure.    Please return to the hospital if you experience any of the following:  You take your blood pressure and it is 180/110 or higher.  You have a severe headache.  You have chest pain or shortness of breath.  You have weakness or numbness in your face, arms, or legs.  You cannot see or talk as well as usual.  Secondary Diagnosis:	Blurry vision, right eye  Secondary Diagnosis:	Hyperlipidemia  Secondary Diagnosis:	Prolonged Q-T interval on ECG Principal Discharge DX:	Hypertensive urgency  Goal:	prevent future episode  Instructions for follow-up, activity and diet:	You have high blood pressure (also called hypertension), and presented to the hospital with a blood pressure of 206/110, which is very elevated. You also had a headache, blurry vision, and heart palpitations when this occurred. In this hospital you were treated with several blood pressure medications, but you became hypotensive (blood pressure too low) and some had to be stopped. Your blood pressure is currently well controlled, however you will have to follow up regularly with your PCP for frequent BP checks so that your medication can be adjusted as needed. You have an appointment to see Dr. Robledo at 11:40 pm on Tuesday, July 18.    High blood pressure usually has no symptoms, but over time, it can damage your heart, blood vessels, eyes, kidneys, and other organs. With help from your doctor, you can manage your blood pressure and protect your health. Take your blood pressure medicine exactly as directed. Don't skip doses. Missing doses can cause your blood pressure to get out of control. If you do miss a dose (or doses) check with your healthcare provider about what to do. Avoid medicine that contain heart stimulants, including over-the-counter drugs. Check for warnings about high blood pressure on the label. Ask the pharmacist before purchasing something you haven't used before. Check with your doctor or pharmacist before taking a decongestant. Some decongestants can worsen high blood pressure.    Please return to the hospital if you experience any of the following:  You take your blood pressure and it is 180/110 or higher.  You have a severe headache.  You have chest pain or shortness of breath.  You have weakness or numbness in your face, arms, or legs.  You cannot see or talk as well as usual.  Secondary Diagnosis:	Blurry vision, right eye  Goal:	resolution  Instructions for follow-up, activity and diet:	When you came to the hospital, you were experiencing blurry vision. Once we controlled your blood pressure, your vision returned to normal within hours. If you experience another episode of blurry vision, please return to your ophthamologist for immediate evaluation. If the blurry vision is associated with headache, nasuea/vomiting, jaw pain, or fever, please seek immediate medical attention.  Secondary Diagnosis:	Hyperlipidemia  Instructions for follow-up, activity and diet:	Hyperlipidemia is a high level of lipids (fats) in your blood. These lipids include cholesterol or triglycerides. Lipids are made by your body. They also come from the foods you eat. Your body needs lipids to work properly, but high levels increase your risk for heart disease, heart attack, and stroke.  To help lower your cholesterol, please continue to take your simvastatin 40 mg daily as prescribed. Exercise lowers your cholesterol levels and helps you maintain a healthy weight. Get 40 minutes or more of moderate exercise 3 to 4 days each week. Examples of moderate exercises include walking briskly, swimming, or riding a bike. .Do not smoke. Nicotine and other chemicals in cigarettes and cigars can increase your risk for a heart attack and stroke. Eat heart-healthy foods and decrease the total amount of fat you eat. Choose lean meats, fat-free or 1% fat milk, and low-fat dairy products, such as yogurt and cheese. Limit or do not eat red meat. Red meats are high in fat and cholesterol.  Secondary Diagnosis:	Prolonged Q-T interval on ECG  Instructions for follow-up, activity and diet:	On your EKG on 7/14, you had an abnormality called a prolonged QTc interval. This can happen for many reasons, but can be caused by some of your medications, such as trazodone and escitalopram. Please see your PCP within one week of discharge to have a repeat EKG to see if this abnormality is still found. You may have to have your medications adjusted.

## 2017-07-21 LAB — RENIN PLAS-CCNC: 0.48 NG/ML/HR — SIGNIFICANT CHANGE UP (ref 0.17–5.38)

## 2017-08-16 ENCOUNTER — EMERGENCY (EMERGENCY)
Facility: HOSPITAL | Age: 62
LOS: 1 days | Discharge: HOME | End: 2017-08-16
Attending: EMERGENCY MEDICINE | Admitting: EMERGENCY MEDICINE
Payer: COMMERCIAL

## 2017-08-16 VITALS
OXYGEN SATURATION: 100 % | DIASTOLIC BLOOD PRESSURE: 87 MMHG | RESPIRATION RATE: 18 BRPM | SYSTOLIC BLOOD PRESSURE: 148 MMHG | HEART RATE: 80 BPM | TEMPERATURE: 99 F

## 2017-08-16 LAB
ANION GAP SERPL CALC-SCNC: 15 MMOL/L — SIGNIFICANT CHANGE UP (ref 5–17)
APTT BLD: 31.5 SEC — SIGNIFICANT CHANGE UP (ref 27.5–37.4)
BASOPHILS # BLD AUTO: 0.1 K/UL — SIGNIFICANT CHANGE UP (ref 0–0.2)
BASOPHILS NFR BLD AUTO: 0.8 % — SIGNIFICANT CHANGE UP (ref 0–2)
BUN SERPL-MCNC: 13 MG/DL — SIGNIFICANT CHANGE UP (ref 7–23)
CALCIUM SERPL-MCNC: 10 MG/DL — SIGNIFICANT CHANGE UP (ref 8.4–10.5)
CHLORIDE SERPL-SCNC: 98 MMOL/L — SIGNIFICANT CHANGE UP (ref 96–108)
CO2 SERPL-SCNC: 29 MMOL/L — SIGNIFICANT CHANGE UP (ref 22–31)
CREAT SERPL-MCNC: 1.13 MG/DL — SIGNIFICANT CHANGE UP (ref 0.5–1.3)
EOSINOPHIL # BLD AUTO: 0.2 K/UL — SIGNIFICANT CHANGE UP (ref 0–0.5)
EOSINOPHIL NFR BLD AUTO: 1.7 % — SIGNIFICANT CHANGE UP (ref 0–6)
GAS PNL BLDV: SIGNIFICANT CHANGE UP
GLUCOSE SERPL-MCNC: 92 MG/DL — SIGNIFICANT CHANGE UP (ref 70–99)
HCT VFR BLD CALC: 40.7 % — SIGNIFICANT CHANGE UP (ref 34.5–45)
HGB BLD-MCNC: 13.4 G/DL — SIGNIFICANT CHANGE UP (ref 11.5–15.5)
INR BLD: 0.96 RATIO — SIGNIFICANT CHANGE UP (ref 0.88–1.16)
LYMPHOCYTES # BLD AUTO: 3.2 K/UL — SIGNIFICANT CHANGE UP (ref 1–3.3)
LYMPHOCYTES # BLD AUTO: 30.2 % — SIGNIFICANT CHANGE UP (ref 13–44)
MCHC RBC-ENTMCNC: 28.4 PG — SIGNIFICANT CHANGE UP (ref 27–34)
MCHC RBC-ENTMCNC: 32.9 GM/DL — SIGNIFICANT CHANGE UP (ref 32–36)
MCV RBC AUTO: 86.3 FL — SIGNIFICANT CHANGE UP (ref 80–100)
MONOCYTES # BLD AUTO: 0.6 K/UL — SIGNIFICANT CHANGE UP (ref 0–0.9)
MONOCYTES NFR BLD AUTO: 5.7 % — SIGNIFICANT CHANGE UP (ref 2–14)
NEUTROPHILS # BLD AUTO: 6.6 K/UL — SIGNIFICANT CHANGE UP (ref 1.8–7.4)
NEUTROPHILS NFR BLD AUTO: 61.6 % — SIGNIFICANT CHANGE UP (ref 43–77)
PLATELET # BLD AUTO: 315 K/UL — SIGNIFICANT CHANGE UP (ref 150–400)
POTASSIUM SERPL-MCNC: 3.4 MMOL/L — LOW (ref 3.5–5.3)
POTASSIUM SERPL-SCNC: 3.4 MMOL/L — LOW (ref 3.5–5.3)
PROTHROM AB SERPL-ACNC: 10.4 SEC — SIGNIFICANT CHANGE UP (ref 9.8–12.7)
RBC # BLD: 4.72 M/UL — SIGNIFICANT CHANGE UP (ref 3.8–5.2)
RBC # FLD: 11.4 % — SIGNIFICANT CHANGE UP (ref 10.3–14.5)
SODIUM SERPL-SCNC: 142 MMOL/L — SIGNIFICANT CHANGE UP (ref 135–145)
TROPONIN T SERPL-MCNC: <0.01 NG/ML — SIGNIFICANT CHANGE UP (ref 0–0.06)
TROPONIN T SERPL-MCNC: <0.01 NG/ML — SIGNIFICANT CHANGE UP (ref 0–0.06)
WBC # BLD: 10.7 K/UL — HIGH (ref 3.8–10.5)
WBC # FLD AUTO: 10.7 K/UL — HIGH (ref 3.8–10.5)

## 2017-08-16 PROCEDURE — 71020: CPT | Mod: 26

## 2017-08-16 PROCEDURE — 99284 EMERGENCY DEPT VISIT MOD MDM: CPT

## 2017-08-16 PROCEDURE — 71275 CT ANGIOGRAPHY CHEST: CPT | Mod: 26

## 2017-08-16 PROCEDURE — 93010 ELECTROCARDIOGRAM REPORT: CPT

## 2017-08-16 PROCEDURE — 99220: CPT | Mod: 25

## 2017-08-16 RX ORDER — POTASSIUM CHLORIDE 20 MEQ
20 PACKET (EA) ORAL ONCE
Qty: 0 | Refills: 0 | Status: COMPLETED | OUTPATIENT
Start: 2017-08-16 | End: 2017-08-16

## 2017-08-16 RX ORDER — SODIUM CHLORIDE 9 MG/ML
3 INJECTION INTRAMUSCULAR; INTRAVENOUS; SUBCUTANEOUS EVERY 8 HOURS
Qty: 0 | Refills: 0 | Status: DISCONTINUED | OUTPATIENT
Start: 2017-08-16 | End: 2017-08-20

## 2017-08-16 RX ORDER — IPRATROPIUM/ALBUTEROL SULFATE 18-103MCG
3 AEROSOL WITH ADAPTER (GRAM) INHALATION ONCE
Qty: 0 | Refills: 0 | Status: COMPLETED | OUTPATIENT
Start: 2017-08-16 | End: 2017-08-16

## 2017-08-16 RX ADMIN — Medication 3 MILLILITER(S): at 14:17

## 2017-08-16 RX ADMIN — Medication 20 MILLIEQUIVALENT(S): at 18:52

## 2017-08-16 RX ADMIN — SODIUM CHLORIDE 3 MILLILITER(S): 9 INJECTION INTRAMUSCULAR; INTRAVENOUS; SUBCUTANEOUS at 22:39

## 2017-08-16 NOTE — ED CDU PROVIDER NOTE - NS ED ROS FT
ROS: denies HA,  fevers/chills, nausea/vomiting, chest pain, diaphoresis, abdominal pain, joint pain, neuro deficits, dysuria/hematuria

## 2017-08-16 NOTE — ED CDU PROVIDER NOTE - PHYSICAL EXAMINATION
Gen: Well appearing, NAD, speaking in full sentences  Head: NCAT  HEENT: PERRL, MMM, normal conjunctiva, anicteric, neck supple  Lung: CTAB, no adventitious sounds  CV: RRR, no murmurs, rubs or gallops  Abd: soft, NTND, no rebound or guarding, no CVAT  MSK: No edema, no visible deformities  Neuro: No focal neurologic deficits. CN II-XII grossly intact. 5/5 strength and normal sensation in all extremities.  Skin: Warm and dry, white scaly rash on L scalp postauricular  Psych: normal mood and affect    Gale:  see below

## 2017-08-16 NOTE — ED CDU PROVIDER NOTE - PLAN OF CARE
1.  Stay Hydrated  2. Continue taking all current home medications  3.  Please follow up with your Primary care provider in 1-2 (Please bring all of your results with you)  4.  Return to the ER for worsening Chest Pain, Shortness of breath or any other concerning symptoms. 1.  Stay Hydrated  2. Continue taking all current home medications  3.  Please follow up with your Primary care provider in 1-2 (Please bring all of your results with you)  4.  Return to the ER for worsening Chest Pain, Shortness of breath or any other concerning symptoms.  5. your cholesterol is high, start taking simvastatin once a day. you will need to follow up with your doctor about this. you also have borderline diabetes. Take aspirin 81 mg once a day to protect your heart  6. Follow up with cardiology clinic [phone] 1.  Stay Hydrated  2. Continue taking all current home medications  3.  Please follow up with your Primary care provider in 1-2 (Please bring all of your results with you)  4.  Return to the ER for worsening Chest Pain, Shortness of breath or any other concerning symptoms.  5. your cholesterol is high, start taking Lipitor 40 once a day and stop taking your current cholesterol medication. you will need to follow up with your doctor about this. you also have borderline diabetes. Take aspirin 81 mg once a day to protect your heart  6. Follow up with cardiology clinic [phone] (Dr. Vo)

## 2017-08-16 NOTE — ED PROVIDER NOTE - PHYSICAL EXAMINATION
Gen: Well appearing, NAD, speaking in full sentences  Head: NCAT  HEENT: PERRL, MMM, normal conjunctiva, anicteric, neck supple  Lung: CTAB, no adventitious sounds  CV: RRR, no murmurs, rubs or gallops  Abd: soft, NTND, no rebound or guarding, no CVAT  MSK: No edema, no visible deformities  Neuro: No focal neurologic deficits. CN II-XII grossly intact. 5/5 strength and normal sensation in all extremities.  Skin: Warm and dry, white scaly rash on L scalp postauricular  Psych: normal mood and affect Gen: Well appearing, NAD, speaking in full sentences  Head: NCAT  HEENT: PERRL, MMM, normal conjunctiva, anicteric, neck supple  Lung: CTAB, no adventitious sounds  CV: RRR, no murmurs, rubs or gallops  Abd: soft, NTND, no rebound or guarding, no CVAT  MSK: No edema, no visible deformities  Neuro: No focal neurologic deficits. CN II-XII grossly intact. 5/5 strength and normal sensation in all extremities.  Skin: Warm and dry, white scaly rash on L scalp postauricular  Psych: normal mood and affect    Gale:  see below

## 2017-08-16 NOTE — ED PROVIDER NOTE - NS ED ROS FT
ROS: denies HA,  fevers/chills, nausea/vomiting, chest pain, diaphoresis, abdominal pain, joint pain, neuro deficits, dysuria/hematuria  +dizziness, SOB, rash, diarrhea

## 2017-08-16 NOTE — ED ADULT NURSE NOTE - OBJECTIVE STATEMENT
[age] year old female presents to ED complaining of persistent dry coughing x "weeks." If any sputum production is made, it is clear. Denies fevers/ bodyaches/ sore throat. Reports tickle in the back of the throat intermittently and sob on exertion. Lungs CTA unlabored. reports chest tightness to sternal area, no pain. Pt 62 year old female presents to ED complaining of persistent dry coughing x "weeks." If any sputum production is made, it is clear. Denies fevers/ bodyaches/ sore throat. Reports tickle in the back of the throat intermittently and sob on exertion. Lungs CTA unlabored. reports chest tightness to sternal area, no pain. Reports tightness present x2 days. Denies nausea/vomiting/diarrhea. Abd soft nontender, nondistended. Denies pedal edema. Reports feeling exhausted. Pt states she was on promethazine syrup with no relief. Presents with 62 year old female presents to ED complaining of persistent dry coughing x "weeks." If any sputum production is made, it is clear. Denies fevers/ bodyaches/ sore throat. Reports tickle in the back of the throat intermittently and sob on exertion. Lungs CTA unlabored. reports chest tightness to sternal area, no pain. Reports tightness present x2 days. Denies nausea/vomiting/diarrhea. Abd soft nontender, nondistended. Denies pedal edema. Reports feeling exhausted. Pt states she was on promethazine syrup with no relief. Reports intermittent lightheadedness and vertigo intermittently. skin w/d/i

## 2017-08-16 NOTE — ED CDU PROVIDER NOTE - OBJECTIVE STATEMENT
61yo F with recent hospitalization for HTN urgency, hx of anxiety presents with SOB and chest tightness. For the last 6 weeks, has been having dry cough, worse at night. Was prescribed promethazine and has been taking it for 1 month without relief. Was at PMD (Dr. Syed Robledo) today - described SOB for 3-4 days, unimproved cough and new onset chest tightness. +rash on L scalp after dyeing with hair product, itchy. denies pleuritic CP. No fevers, chills. denies wheezing, productive cough. Recent travel to [place]. Of note, hospitalized in mid-July for HTN urgency. 6/3, visited ER for colitis. Chest tightness like former asthma with exertion.  Strong family history for CAD 61yo F with recent hospitalization for HTN urgency, hx of anxiety presents with SOB and chest tightness. For the last 6 weeks, has been having dry cough, worse at night. Was prescribed promethazine and has been taking it for 1 month without relief. Was at PMD (Dr. Syed Robledo) today - described SOB for 3-4 days, unimproved cough and new onset chest tightness. +rash on L scalp after dyeing with hair product, itchy. denies pleuritic CP. No fevers, chills. denies wheezing, productive cough. Recent travel to [place]. Of note, hospitalized in mid-July for HTN urgency. 6/3, visited ER for colitis. Chest tightness like former asthma with exertion.  Strong family history for CAD    Gale:  See ED provider Note

## 2017-08-16 NOTE — ED CDU PROVIDER NOTE - PROGRESS NOTE DETAILS
Patient seen at bedside in NAD.  VSS.  Patient resting comfortably without complaints. No cardiac events noted on tele.  -Dimas Temple PA-C Patient seen at bedside in NAD.  VSS.  Patient resting comfortably without complaints. No cardiac events noted on tele.  Trop negative x 2.  EKG unchanged.  -Dimas Temple PA-C CDU NOTE FAUZIA EDWARDS: NAD VSS.  Patient resting comfortably and has no current complaints. no events on tele. awaiting stress. patient at stress. - Carmen Madera PA-C patient returned from stress. awaiting results. stress abnormal. paged cardiology. no events on tele. discussed results with patient- Carmen Madera PA-C ED attending Dr Dev Wilson note:  Patient re-evaluated and doing well.  No acute issues at  this time.  Lab and radiology tests reviewed with patient and/or family.  Patient stable for discharge.  I have personally performed a face to face diagnostic evaluation on this patient.  I have reviewed the ACP note and agree with the history, exam, and plan of care, except as noted.  History and Exam by me show improvement, seen by cards recommend d.c home, start statin, pmd follow up. no event son tele, r/o 2 sets of ce. patient seen by Dr Vo who offered admission and patient declined. feels patient should be switched to lipitor 40, and aspirin 81 mg. discussed discharge with Dr. Wilson. - Carmen Madera PA-C

## 2017-08-16 NOTE — ED CDU PROVIDER NOTE - DETAILS
61 y/o F p/w shortness of breath   - observation status with frequent re-evaluations  - serial cardiac enzymes with repeat EKGs  - nuclear stress test  - plan d/w Dr. Gale

## 2017-08-16 NOTE — ED PROVIDER NOTE - OBJECTIVE STATEMENT
63yo F with recent hospitalization for HTN urgency, hx of anxiety presents with SOB and chest tightness. For the last 6 weeks, has been having dry cough, worse at night. Was prescribed promethazine and has been taking it for 1 month without relief. Was at PMD (Dr. Syed Robledo) today - described SOB for 3-4 days, unimproved cough and new onset chest tightness. +rash on L scalp after dyeing with hair product, itchy. denies pleuritic CP. No fevers, chills. denies wheezing, productive cough. Recent travel to [place]. Of note, hospitalized in mid-July for HTN urgency. 6/3, visited ER for colitis. 63yo F with recent hospitalization for HTN urgency, hx of anxiety presents with SOB and chest tightness. For the last 6 weeks, has been having dry cough, worse at night. Was prescribed promethazine and has been taking it for 1 month without relief. Was at PMD (Dr. Syed Robledo) today - described SOB for 3-4 days, unimproved cough and new onset chest tightness. +rash on L scalp after dyeing with hair product, itchy. denies pleuritic CP. No fevers, chills. denies wheezing, productive cough. Recent travel to [place]. Of note, hospitalized in mid-July for HTN urgency. 6/3, visited ER for colitis.    Gale:  chest tightness like former asthma with exertion.  strong family history for CAD

## 2017-08-16 NOTE — ED PROVIDER NOTE - ATTENDING CONTRIBUTION TO CARE
Baljinder:  I have independently evaluated the patient and have documented in the appropriate sections above.  I agree with the exam and plan as noted above.

## 2017-08-17 VITALS
SYSTOLIC BLOOD PRESSURE: 118 MMHG | DIASTOLIC BLOOD PRESSURE: 79 MMHG | HEART RATE: 95 BPM | RESPIRATION RATE: 16 BRPM | TEMPERATURE: 98 F | OXYGEN SATURATION: 99 %

## 2017-08-17 LAB
CHOLEST SERPL-MCNC: 257 MG/DL — HIGH (ref 10–199)
HBA1C BLD-MCNC: 5.7 % — HIGH (ref 4–5.6)
HDLC SERPL-MCNC: 44 MG/DL — SIGNIFICANT CHANGE UP (ref 40–125)
LIPID PNL WITH DIRECT LDL SERPL: 163 MG/DL — HIGH
TOTAL CHOLESTEROL/HDL RATIO MEASUREMENT: 5.8 RATIO — SIGNIFICANT CHANGE UP (ref 3.3–7.1)
TRIGL SERPL-MCNC: 249 MG/DL — HIGH (ref 10–149)

## 2017-08-17 PROCEDURE — 82947 ASSAY GLUCOSE BLOOD QUANT: CPT

## 2017-08-17 PROCEDURE — G0378: CPT

## 2017-08-17 PROCEDURE — 99284 EMERGENCY DEPT VISIT MOD MDM: CPT | Mod: 25

## 2017-08-17 PROCEDURE — 84295 ASSAY OF SERUM SODIUM: CPT

## 2017-08-17 PROCEDURE — 94640 AIRWAY INHALATION TREATMENT: CPT

## 2017-08-17 PROCEDURE — 93017 CV STRESS TEST TRACING ONLY: CPT

## 2017-08-17 PROCEDURE — 78452 HT MUSCLE IMAGE SPECT MULT: CPT

## 2017-08-17 PROCEDURE — 85610 PROTHROMBIN TIME: CPT

## 2017-08-17 PROCEDURE — 80048 BASIC METABOLIC PNL TOTAL CA: CPT

## 2017-08-17 PROCEDURE — 93018 CV STRESS TEST I&R ONLY: CPT

## 2017-08-17 PROCEDURE — 83605 ASSAY OF LACTIC ACID: CPT

## 2017-08-17 PROCEDURE — 82435 ASSAY OF BLOOD CHLORIDE: CPT

## 2017-08-17 PROCEDURE — 93016 CV STRESS TEST SUPVJ ONLY: CPT

## 2017-08-17 PROCEDURE — 80061 LIPID PANEL: CPT

## 2017-08-17 PROCEDURE — A9500: CPT

## 2017-08-17 PROCEDURE — 83036 HEMOGLOBIN GLYCOSYLATED A1C: CPT

## 2017-08-17 PROCEDURE — 71046 X-RAY EXAM CHEST 2 VIEWS: CPT

## 2017-08-17 PROCEDURE — 85730 THROMBOPLASTIN TIME PARTIAL: CPT

## 2017-08-17 PROCEDURE — 82803 BLOOD GASES ANY COMBINATION: CPT

## 2017-08-17 PROCEDURE — 84132 ASSAY OF SERUM POTASSIUM: CPT

## 2017-08-17 PROCEDURE — 85027 COMPLETE CBC AUTOMATED: CPT

## 2017-08-17 PROCEDURE — 82330 ASSAY OF CALCIUM: CPT

## 2017-08-17 PROCEDURE — 78452 HT MUSCLE IMAGE SPECT MULT: CPT | Mod: 26

## 2017-08-17 PROCEDURE — 82565 ASSAY OF CREATININE: CPT

## 2017-08-17 PROCEDURE — 93005 ELECTROCARDIOGRAM TRACING: CPT

## 2017-08-17 PROCEDURE — 85379 FIBRIN DEGRADATION QUANT: CPT

## 2017-08-17 PROCEDURE — 85014 HEMATOCRIT: CPT

## 2017-08-17 PROCEDURE — 71275 CT ANGIOGRAPHY CHEST: CPT

## 2017-08-17 PROCEDURE — 83880 ASSAY OF NATRIURETIC PEPTIDE: CPT

## 2017-08-17 PROCEDURE — 99217: CPT

## 2017-08-17 PROCEDURE — 84484 ASSAY OF TROPONIN QUANT: CPT

## 2017-08-17 RX ADMIN — SODIUM CHLORIDE 3 MILLILITER(S): 9 INJECTION INTRAMUSCULAR; INTRAVENOUS; SUBCUTANEOUS at 14:50

## 2017-08-17 RX ADMIN — SODIUM CHLORIDE 3 MILLILITER(S): 9 INJECTION INTRAMUSCULAR; INTRAVENOUS; SUBCUTANEOUS at 07:00

## 2017-08-17 NOTE — CONSULT NOTE ADULT - SUBJECTIVE AND OBJECTIVE BOX
Chief Complaint:  Chest pain    HPI: 62 F PMH as stated presents with chest pain. Described as a tightness that is brought on by a cough. The pain does not radiate. Resolves on its own. Similar to her prior asthma. No associated symptoms. Patient was recently admitted for hypertensive urgency and had medications adjusted. She was started on a beta blocker.     PMH:   Uncomplicated asthma, unspecified asthma severity  Hypertension  Hyperlipidemia    PSH:   No significant past surgical history    Family History:  FAMILY HISTORY:  No pertinent family history in first degree relatives    Allergies:  No Known Allergies    Social History:  Smoking: No smoking  Alcohol: no etoh  Drugs:    Medications:  sodium chloride 0.9% lock flush 3 milliLiter(s) IV Push every 8 hours  metoprolol tartrate 25 mg oral tablet: 1 tab(s) orally 2 times a day  · 	amLODIPine 10 mg oral tablet, disintegrating:  orally   · 	escitalopram 10 mg oral tablet: 1 tab(s) orally once a day  · 	fluticasone 50 mcg inhalation powder: 1 puff(s) inhaled 2 times a day  · 	hydroCHLOROthiazide 25 mg oral tablet: 1 tab(s) orally once a day  · 	omeprazole 20 mg oral delayed release capsule: 1 cap(s) orally once a day  · 	simvastatin 40 mg oral tablet: 1 tab(s) orally once a day (at bedtime)  · 	traZODone 300 mg oral tablet: 1 tab(s) orally once a day (at bedtime)    Cardiovascular Diagnostic Testing:  ECG: NSR. NO acute ST-T changes.     Echo:    Stress Testing: < from: Nuclear Stress Test-Pharmacologic (17 @ 11:18) >  * The left ventricle was normal in size. There is a small,  mild defect in the distal anteroseptal wall that is  reversible suggestive of mild ischemia.  * There is a small, very mild defect in the distal  inferolateral wall that is reversible suggetive of minimal  ischemia.  * There are also mild to moderate defects in the inferior  and basal inferoseptal walls that are mostly fixed,  predominantly correct with prone imaging, and demonstrate  normal wall motion suggestive of attenuation artifact.  * Post-stress gated wall motion analysis was performed  (LVEF > 70%;LVEDV = 50 ml.), revealing normal LV function.    < end of copied text >      Cath:    Imaging:    Labs:                        13.4   10.7  )-----------( 315      ( 16 Aug 2017 14:16 )             40.7         142  |  98  |  13  ----------------------------<  92  3.4<L>   |  29  |  1.13    Ca    10.0      16 Aug 2017 14:16      PT/INR - ( 16 Aug 2017 14:16 )   PT: 10.4 sec;   INR: 0.96 ratio         PTT - ( 16 Aug 2017 14:16 )  PTT:31.5 sec  CARDIAC MARKERS ( 16 Aug 2017 20:56 )  x     / <0.01 ng/mL / x     / x     / x      CARDIAC MARKERS ( 16 Aug 2017 14:16 )  x     / <0.01 ng/mL / x     / x     / x          Serum Pro-Brain Natriuretic Peptide: 26 pg/mL ( @ 14:16)    Total Cholesterol: 257  LDL: 163  HDL: 44  T    Hemoglobin A1C, Whole Blood: 5.7 % ( @ 01:03)        Physical Exam:  T(C): 36.8 (17 @ 16:09), Max: 36.9 (17 @ 17:18)  HR: 95 (17 @ 16:09) (78 - 96)  BP: 118/79 (17 @ 16:09) (106/70 - 147/69)  RR: 16 (17 @ 16:09) (16 - 20)  SpO2: 99% (17 @ 16:09) (97% - 100%)  Wt(kg): --    Daily     Daily

## 2017-08-17 NOTE — ED ADULT NURSE REASSESSMENT NOTE - NS ED NURSE REASSESS COMMENT FT1
0400- patient in no respiratory distress. With very occassional dry cough. Denies chest discomfort. Sleeping comfortably. On continuous cardiac monitoring- NSR.
nebulizer treatment initiated
report to CDU FELICE best on tele
Received pt from Jennyfer VIVEROS , received pt alert and responsive, oriented x4, denies any respiratory distress, SOB, or difficulty breathing. Pt transferred to CDU for observation for chest tightness and cough, IV in place, patent and free of signs of infiltration, placed on continuos cardiac monitoring as ordered, NSR HR in the 90's,  pt states "chest is tight" no palpitations, V/S stable, pt afebrile, pt denies pain at this time. Pt educated on unit and unit rules, instructed patient to notify RN of any needed assistance, Pt verbalizes understanding, Call bell placed within reach. Safety maintained. Will continue to monitor.

## 2017-08-17 NOTE — CONSULT NOTE ADULT - ASSESSMENT
62 F with HTN, HLD with atypical chest pain and mildly abnormal stress test.   ·	Patient's symptoms are atypical and defects on stress are very mild (low risk stress test). Given this I offered the patient cath or medical management and she opts for medical management.   ·	Change zocor to Lipitor 40 as LDL is 163.   ·	Start ASA 81 mg  ·	Continue other medications. If patient has history of asthma it may be possible that beta blocker is exacerbating.   ·	Can d/c with close follow up this week. If symptoms worsen she must return immediately.

## 2017-09-17 ENCOUNTER — EMERGENCY (EMERGENCY)
Facility: HOSPITAL | Age: 62
LOS: 1 days | Discharge: HOME | End: 2017-09-17
Admitting: EMERGENCY MEDICINE
Payer: COMMERCIAL

## 2017-09-17 VITALS
OXYGEN SATURATION: 100 % | SYSTOLIC BLOOD PRESSURE: 216 MMHG | TEMPERATURE: 99 F | DIASTOLIC BLOOD PRESSURE: 66 MMHG | HEART RATE: 67 BPM | RESPIRATION RATE: 16 BRPM | WEIGHT: 160.06 LBS

## 2017-09-17 VITALS — DIASTOLIC BLOOD PRESSURE: 74 MMHG | SYSTOLIC BLOOD PRESSURE: 170 MMHG

## 2017-09-17 LAB
ALBUMIN SERPL ELPH-MCNC: 4.2 G/DL — SIGNIFICANT CHANGE UP (ref 3.3–5)
ALP SERPL-CCNC: 98 U/L — SIGNIFICANT CHANGE UP (ref 40–120)
ALT FLD-CCNC: 22 U/L RC — SIGNIFICANT CHANGE UP (ref 10–45)
ANION GAP SERPL CALC-SCNC: 15 MMOL/L — SIGNIFICANT CHANGE UP (ref 5–17)
AST SERPL-CCNC: 29 U/L — SIGNIFICANT CHANGE UP (ref 10–40)
BASOPHILS # BLD AUTO: 0.1 K/UL — SIGNIFICANT CHANGE UP (ref 0–0.2)
BASOPHILS NFR BLD AUTO: 0.8 % — SIGNIFICANT CHANGE UP (ref 0–2)
BILIRUB SERPL-MCNC: 0.5 MG/DL — SIGNIFICANT CHANGE UP (ref 0.2–1.2)
BUN SERPL-MCNC: 12 MG/DL — SIGNIFICANT CHANGE UP (ref 7–23)
CALCIUM SERPL-MCNC: 9.4 MG/DL — SIGNIFICANT CHANGE UP (ref 8.4–10.5)
CHLORIDE SERPL-SCNC: 101 MMOL/L — SIGNIFICANT CHANGE UP (ref 96–108)
CK MB BLD-MCNC: 1.5 % — SIGNIFICANT CHANGE UP (ref 0–3.5)
CK MB CFR SERPL CALC: 2.4 NG/ML — SIGNIFICANT CHANGE UP (ref 0–3.8)
CK SERPL-CCNC: 159 U/L — SIGNIFICANT CHANGE UP (ref 25–170)
CO2 SERPL-SCNC: 25 MMOL/L — SIGNIFICANT CHANGE UP (ref 22–31)
CREAT SERPL-MCNC: 1.02 MG/DL — SIGNIFICANT CHANGE UP (ref 0.5–1.3)
EOSINOPHIL # BLD AUTO: 0.2 K/UL — SIGNIFICANT CHANGE UP (ref 0–0.5)
EOSINOPHIL NFR BLD AUTO: 1.6 % — SIGNIFICANT CHANGE UP (ref 0–6)
GLUCOSE SERPL-MCNC: 88 MG/DL — SIGNIFICANT CHANGE UP (ref 70–99)
HCT VFR BLD CALC: 35.9 % — SIGNIFICANT CHANGE UP (ref 34.5–45)
HGB BLD-MCNC: 12.3 G/DL — SIGNIFICANT CHANGE UP (ref 11.5–15.5)
LYMPHOCYTES # BLD AUTO: 29.6 % — SIGNIFICANT CHANGE UP (ref 13–44)
LYMPHOCYTES # BLD AUTO: 3 K/UL — SIGNIFICANT CHANGE UP (ref 1–3.3)
MCHC RBC-ENTMCNC: 30.2 PG — SIGNIFICANT CHANGE UP (ref 27–34)
MCHC RBC-ENTMCNC: 34.2 GM/DL — SIGNIFICANT CHANGE UP (ref 32–36)
MCV RBC AUTO: 88.5 FL — SIGNIFICANT CHANGE UP (ref 80–100)
MONOCYTES # BLD AUTO: 0.7 K/UL — SIGNIFICANT CHANGE UP (ref 0–0.9)
MONOCYTES NFR BLD AUTO: 6.9 % — SIGNIFICANT CHANGE UP (ref 2–14)
NEUTROPHILS # BLD AUTO: 6.2 K/UL — SIGNIFICANT CHANGE UP (ref 1.8–7.4)
NEUTROPHILS NFR BLD AUTO: 61.2 % — SIGNIFICANT CHANGE UP (ref 43–77)
PLATELET # BLD AUTO: 265 K/UL — SIGNIFICANT CHANGE UP (ref 150–400)
POTASSIUM SERPL-MCNC: 4.3 MMOL/L — SIGNIFICANT CHANGE UP (ref 3.5–5.3)
POTASSIUM SERPL-SCNC: 4.3 MMOL/L — SIGNIFICANT CHANGE UP (ref 3.5–5.3)
PROT SERPL-MCNC: 8 G/DL — SIGNIFICANT CHANGE UP (ref 6–8.3)
RBC # BLD: 4.06 M/UL — SIGNIFICANT CHANGE UP (ref 3.8–5.2)
RBC # FLD: 12.3 % — SIGNIFICANT CHANGE UP (ref 10.3–14.5)
SODIUM SERPL-SCNC: 141 MMOL/L — SIGNIFICANT CHANGE UP (ref 135–145)
TROPONIN T SERPL-MCNC: <0.01 NG/ML — SIGNIFICANT CHANGE UP (ref 0–0.06)
WBC # BLD: 10.2 K/UL — SIGNIFICANT CHANGE UP (ref 3.8–10.5)
WBC # FLD AUTO: 10.2 K/UL — SIGNIFICANT CHANGE UP (ref 3.8–10.5)

## 2017-09-17 PROCEDURE — 84484 ASSAY OF TROPONIN QUANT: CPT

## 2017-09-17 PROCEDURE — 93005 ELECTROCARDIOGRAM TRACING: CPT

## 2017-09-17 PROCEDURE — 99285 EMERGENCY DEPT VISIT HI MDM: CPT | Mod: 25

## 2017-09-17 PROCEDURE — 99284 EMERGENCY DEPT VISIT MOD MDM: CPT | Mod: 25

## 2017-09-17 PROCEDURE — 82553 CREATINE MB FRACTION: CPT

## 2017-09-17 PROCEDURE — 93010 ELECTROCARDIOGRAM REPORT: CPT

## 2017-09-17 PROCEDURE — 85027 COMPLETE CBC AUTOMATED: CPT

## 2017-09-17 PROCEDURE — 82550 ASSAY OF CK (CPK): CPT

## 2017-09-17 PROCEDURE — 80053 COMPREHEN METABOLIC PANEL: CPT

## 2017-09-17 PROCEDURE — 96374 THER/PROPH/DIAG INJ IV PUSH: CPT

## 2017-09-17 PROCEDURE — 71045 X-RAY EXAM CHEST 1 VIEW: CPT

## 2017-09-17 PROCEDURE — 71010: CPT | Mod: 26

## 2017-09-17 RX ORDER — HYDRALAZINE HCL 50 MG
10 TABLET ORAL ONCE
Qty: 0 | Refills: 0 | Status: COMPLETED | OUTPATIENT
Start: 2017-09-17 | End: 2017-09-17

## 2017-09-17 RX ORDER — SIMVASTATIN 20 MG/1
1 TABLET, FILM COATED ORAL
Qty: 0 | Refills: 0 | COMMUNITY

## 2017-09-17 RX ADMIN — Medication 10 MILLIGRAM(S): at 18:51

## 2017-09-17 NOTE — ED PROVIDER NOTE - MEDICAL DECISION MAKING DETAILS
Impression:  hypertensive urgency.  Currently asymptomatic after BP control.  Plan:  consider increasing outpatient amlodipine.  Long discussion with  re: outpatient BP control; he is requesting a list of outpatient internists for outpatient f/u.  I do not suspect CVA at this time.

## 2017-09-17 NOTE — ED ADULT NURSE REASSESSMENT NOTE - NS ED NURSE REASSESS COMMENT FT1
Received patient awake and alert x 4 from previous shift RN, Presents to the ED with headache and palpitations. At this time patient resting comfortably with no distress noted. States headache has resolved, Cardiac monitor ongoing. B/P maintained, Questions and concerns addressed with patient and family, safety maintained, will continue to monitor.

## 2017-09-17 NOTE — ED ADULT NURSE NOTE - DISCHARGE TEACHING
Discharge instructions provided, verbalized understanding, left ED in stable condition, no distress noted.

## 2017-09-17 NOTE — ED PROVIDER NOTE - ATTENDING CONTRIBUTION TO CARE
MD Garber:  patient seen and evaluated with the resident.  I was present for key portions of the History & Physical, and I agree with the Impression & Plan.  MD Garber:  61 yo F, bib  for evaluation of HA + HTN.  Patient has MHx of essential HTN and takes amlodipine, metoprolol, and lisinopril; no dose changes in last 2 months.  /66 --> 177/77 after 10mg IV hydralazine. MD Garber:  patient seen and evaluated with the resident.  I was present for key portions of the History & Physical, and I agree with the Impression & Plan.  MD Garber:  61 yo F, bib  for evaluation of HA + HTN.  Patient has MHx of essential HTN and takes amlodipine, metoprolol, and lisinopril; no dose changes in last 2 months.  /66 --> 177/77 after 10mg IV hydralazine (HA completely resolved).  Afebrile.  HR 67.  Physical Exam: adult F, NAD, NCAT, PERRl, EOMI, neck supple, CTA, RRR, Abd:  s/nd/nt.  Extremities:   strength 5/5 throughout.  Impression:  hypertensive urgency.  Currently asymptomatic after BP control.  Plan:  consider increasing outpatient amlodipine.  Long discussion with  re: outpatient BP control; he is requesting a list of outpatient internists for outpatient f/u.  I do not suspect CVA at this time.

## 2017-09-17 NOTE — ED ADULT NURSE NOTE - OBJECTIVE STATEMENT
62 y.o female pmh HTN and high cholesterol c/o HA and elevated bp. [place] feeling [place] and started getting a HA, states these symptoms are usually accompanied by her bp being out of control in which she was just recently hospitalized for. states her meds were adjusted and changed around and they got her bp under control. states all has been well since then except for the passed week where she states he bp has been elevated regardless of her daily bp meds. denies any dizziness, blurred vision, weakness, numbness, tingling, or vomiting. bp with systolic of 200s upon ED arrival in triage. upon assessment pts manual bp of 190/84, HR of 70s. pts only complaint is generalized HA.  at bedside. labs obtained. safety and fall precautions maintained.

## 2017-09-17 NOTE — ED PROVIDER NOTE - OBJECTIVE STATEMENT
63 y/o F with pmh of HTN, HLD, asthma, depression, anxiety presents with headache and palpitations in the setting of elevated bp. Pt states she was out today when she developed right sided frontal headache with nausea. Also had palpitations that lasted a couple of seconds without associated chest pain. Denies dizziness, weakness, numbness, changes in vision. Her blood pressure at the time was 200s/100s. Reports taking all her BP meds this morning (HCTZ, metoprolol, amlodipine). States blood pressure has been uncontrolled for the past week but asymptomatic until today. Pt has had several ED visits for similar symptoms once requiring admission for HTN urgency. No history of stroke, TIA or CAD. Also reports chronic cough and SOB with post nasal drip. Denies fevers, chills, abdominal pain or dysuria.

## 2017-09-17 NOTE — ED PROVIDER NOTE - PROGRESS NOTE DETAILS
Hypertensive urgency symptomatic with headache, now resolved. Hydralazine 10 mg IV, cbc, cmp, cardiac enzymes, chest xray. EKG wnl.

## 2017-09-17 NOTE — ED PROVIDER NOTE - CHIEF COMPLAINT
The patient is a 62y Female complaining of The patient is a 62y Female complaining of headache and elevated BP

## 2017-09-17 NOTE — ED PROVIDER NOTE - PLAN OF CARE
You presented to the ED with elevated blood pressure which improved with IV medications. Please continue with blood pressure medications as prescribed prior to visit. It is essential for you to follow a low salt diet and exercise regularly. Follow up with your PCP within the week.

## 2017-09-17 NOTE — ED PROVIDER NOTE - CARE PLAN
Principal Discharge DX:	Hypertensive urgency Principal Discharge DX:	Hypertensive urgency  Instructions for follow-up, activity and diet:	You presented to the ED with elevated blood pressure which improved with IV medications. Please continue with blood pressure medications as prescribed prior to visit. It is essential for you to follow a low salt diet and exercise regularly. Follow up with your PCP within the week.

## 2017-10-03 ENCOUNTER — OUTPATIENT (OUTPATIENT)
Dept: OUTPATIENT SERVICES | Facility: HOSPITAL | Age: 62
LOS: 1 days | End: 2017-10-03
Payer: COMMERCIAL

## 2017-10-03 ENCOUNTER — APPOINTMENT (OUTPATIENT)
Dept: INTERNAL MEDICINE | Facility: CLINIC | Age: 62
End: 2017-10-03
Payer: MEDICARE

## 2017-10-03 ENCOUNTER — NON-APPOINTMENT (OUTPATIENT)
Age: 62
End: 2017-10-03

## 2017-10-03 VITALS
WEIGHT: 164 LBS | HEIGHT: 64 IN | OXYGEN SATURATION: 98 % | HEART RATE: 84 BPM | SYSTOLIC BLOOD PRESSURE: 160 MMHG | DIASTOLIC BLOOD PRESSURE: 90 MMHG | BODY MASS INDEX: 28 KG/M2

## 2017-10-03 DIAGNOSIS — I10 ESSENTIAL (PRIMARY) HYPERTENSION: ICD-10-CM

## 2017-10-03 PROCEDURE — 99204 OFFICE O/P NEW MOD 45 MIN: CPT | Mod: GC

## 2017-10-03 PROCEDURE — 93005 ELECTROCARDIOGRAM TRACING: CPT

## 2017-10-03 PROCEDURE — G0463: CPT

## 2017-10-10 DIAGNOSIS — R06.02 SHORTNESS OF BREATH: ICD-10-CM

## 2017-10-10 DIAGNOSIS — L29.9 PRURITUS, UNSPECIFIED: ICD-10-CM

## 2017-10-10 DIAGNOSIS — Z82.49 FAMILY HISTORY OF ISCHEMIC HEART DISEASE AND OTHER DISEASES OF THE CIRCULATORY SYSTEM: ICD-10-CM

## 2017-10-10 DIAGNOSIS — M19.90 UNSPECIFIED OSTEOARTHRITIS, UNSPECIFIED SITE: ICD-10-CM

## 2017-10-10 DIAGNOSIS — E78.5 HYPERLIPIDEMIA, UNSPECIFIED: ICD-10-CM

## 2017-10-17 ENCOUNTER — OUTPATIENT (OUTPATIENT)
Dept: OUTPATIENT SERVICES | Facility: HOSPITAL | Age: 62
LOS: 1 days | End: 2017-10-17
Payer: COMMERCIAL

## 2017-10-17 ENCOUNTER — APPOINTMENT (OUTPATIENT)
Dept: INTERNAL MEDICINE | Facility: CLINIC | Age: 62
End: 2017-10-17
Payer: MEDICARE

## 2017-10-17 VITALS
DIASTOLIC BLOOD PRESSURE: 90 MMHG | BODY MASS INDEX: 27.66 KG/M2 | SYSTOLIC BLOOD PRESSURE: 160 MMHG | HEIGHT: 64 IN | WEIGHT: 162 LBS

## 2017-10-17 VITALS — SYSTOLIC BLOOD PRESSURE: 170 MMHG | DIASTOLIC BLOOD PRESSURE: 90 MMHG

## 2017-10-17 DIAGNOSIS — I10 ESSENTIAL (PRIMARY) HYPERTENSION: ICD-10-CM

## 2017-10-17 PROCEDURE — G0463: CPT

## 2017-10-17 PROCEDURE — 99213 OFFICE O/P EST LOW 20 MIN: CPT | Mod: GE

## 2017-10-20 ENCOUNTER — APPOINTMENT (OUTPATIENT)
Dept: DERMATOLOGY | Facility: CLINIC | Age: 62
End: 2017-10-20
Payer: MEDICARE

## 2017-10-20 VITALS
BODY MASS INDEX: 27.83 KG/M2 | WEIGHT: 163 LBS | HEIGHT: 64 IN | SYSTOLIC BLOOD PRESSURE: 140 MMHG | DIASTOLIC BLOOD PRESSURE: 94 MMHG

## 2017-10-20 DIAGNOSIS — Z87.39 PERSONAL HISTORY OF OTHER DISEASES OF THE MUSCULOSKELETAL SYSTEM AND CONNECTIVE TISSUE: ICD-10-CM

## 2017-10-20 DIAGNOSIS — H54.7 UNSPECIFIED VISUAL LOSS: ICD-10-CM

## 2017-10-20 PROCEDURE — 99203 OFFICE O/P NEW LOW 30 MIN: CPT | Mod: GC

## 2017-10-26 DIAGNOSIS — F41.8 OTHER SPECIFIED ANXIETY DISORDERS: ICD-10-CM

## 2017-10-28 ENCOUNTER — APPOINTMENT (OUTPATIENT)
Dept: ULTRASOUND IMAGING | Facility: CLINIC | Age: 62
End: 2017-10-28

## 2017-10-30 ENCOUNTER — APPOINTMENT (OUTPATIENT)
Dept: PULMONOLOGY | Facility: CLINIC | Age: 62
End: 2017-10-30
Payer: MEDICARE

## 2017-10-30 PROCEDURE — 94729 DIFFUSING CAPACITY: CPT

## 2017-10-30 PROCEDURE — 94726 PLETHYSMOGRAPHY LUNG VOLUMES: CPT

## 2017-10-30 PROCEDURE — 94060 EVALUATION OF WHEEZING: CPT

## 2017-11-06 ENCOUNTER — APPOINTMENT (OUTPATIENT)
Dept: INTERNAL MEDICINE | Facility: CLINIC | Age: 62
End: 2017-11-06
Payer: MEDICARE

## 2017-11-06 ENCOUNTER — LABORATORY RESULT (OUTPATIENT)
Age: 62
End: 2017-11-06

## 2017-11-06 ENCOUNTER — OUTPATIENT (OUTPATIENT)
Dept: OUTPATIENT SERVICES | Facility: HOSPITAL | Age: 62
LOS: 1 days | End: 2017-11-06
Payer: MEDICARE

## 2017-11-06 VITALS
WEIGHT: 162 LBS | DIASTOLIC BLOOD PRESSURE: 90 MMHG | BODY MASS INDEX: 27.66 KG/M2 | SYSTOLIC BLOOD PRESSURE: 152 MMHG | HEART RATE: 87 BPM | HEIGHT: 64 IN | OXYGEN SATURATION: 97 %

## 2017-11-06 DIAGNOSIS — I10 ESSENTIAL (PRIMARY) HYPERTENSION: ICD-10-CM

## 2017-11-06 PROCEDURE — 90736 HZV VACCINE LIVE SUBQ: CPT

## 2017-11-06 PROCEDURE — 84244 ASSAY OF RENIN: CPT

## 2017-11-06 PROCEDURE — 90472 IMMUNIZATION ADMIN EACH ADD: CPT

## 2017-11-06 PROCEDURE — 90715 TDAP VACCINE 7 YRS/> IM: CPT

## 2017-11-06 PROCEDURE — 90688 IIV4 VACCINE SPLT 0.5 ML IM: CPT

## 2017-11-06 PROCEDURE — 99214 OFFICE O/P EST MOD 30 MIN: CPT | Mod: GC

## 2017-11-06 PROCEDURE — G0008: CPT

## 2017-11-06 PROCEDURE — G0463: CPT

## 2017-11-06 PROCEDURE — 83835 ASSAY OF METANEPHRINES: CPT

## 2017-11-08 ENCOUNTER — APPOINTMENT (OUTPATIENT)
Dept: ULTRASOUND IMAGING | Facility: CLINIC | Age: 62
End: 2017-11-08
Payer: MEDICARE

## 2017-11-08 ENCOUNTER — OUTPATIENT (OUTPATIENT)
Dept: OUTPATIENT SERVICES | Facility: HOSPITAL | Age: 62
LOS: 1 days | End: 2017-11-08
Payer: MEDICARE

## 2017-11-08 DIAGNOSIS — I10 ESSENTIAL (PRIMARY) HYPERTENSION: ICD-10-CM

## 2017-11-08 DIAGNOSIS — Z00.8 ENCOUNTER FOR OTHER GENERAL EXAMINATION: ICD-10-CM

## 2017-11-08 LAB
ALBUMIN SERPL ELPH-MCNC: 4.1 G/DL
ALP BLD-CCNC: 111 U/L
ALT SERPL-CCNC: 15 U/L
ANION GAP SERPL CALC-SCNC: 14 MMOL/L
AST SERPL-CCNC: 20 U/L
BASOPHILS # BLD AUTO: 0.01 K/UL
BASOPHILS NFR BLD AUTO: 0.1 %
BILIRUB SERPL-MCNC: 0.3 MG/DL
BUN SERPL-MCNC: 12 MG/DL
CALCIUM SERPL-MCNC: 10.2 MG/DL
CHLORIDE SERPL-SCNC: 101 MMOL/L
CHOLEST SERPL-MCNC: 247 MG/DL
CHOLEST/HDLC SERPL: 6.2 RATIO
CO2 SERPL-SCNC: 28 MMOL/L
CREAT SERPL-MCNC: 1.1 MG/DL
EOSINOPHIL # BLD AUTO: 0.19 K/UL
EOSINOPHIL NFR BLD AUTO: 2.2 %
GLUCOSE SERPL-MCNC: 79 MG/DL
HBA1C MFR BLD HPLC: 5.1 %
HCT VFR BLD CALC: 37.1 %
HDLC SERPL-MCNC: 40 MG/DL
HGB BLD-MCNC: 12.4 G/DL
IMM GRANULOCYTES NFR BLD AUTO: 0.3 %
LDLC SERPL CALC-MCNC: NORMAL
LYMPHOCYTES # BLD AUTO: 2.43 K/UL
LYMPHOCYTES NFR BLD AUTO: 28 %
MAN DIFF?: NORMAL
MCHC RBC-ENTMCNC: 28.8 PG
MCHC RBC-ENTMCNC: 33.4 GM/DL
MCV RBC AUTO: 86.3 FL
MONOCYTES # BLD AUTO: 0.47 K/UL
MONOCYTES NFR BLD AUTO: 5.4 %
NEUTROPHILS # BLD AUTO: 5.56 K/UL
NEUTROPHILS NFR BLD AUTO: 64 %
PLATELET # BLD AUTO: 276 K/UL
POTASSIUM SERPL-SCNC: 3.9 MMOL/L
PROT SERPL-MCNC: 7.9 G/DL
RBC # BLD: 4.3 M/UL
RBC # FLD: 13 %
SODIUM SERPL-SCNC: 143 MMOL/L
TRIGL SERPL-MCNC: 428 MG/DL
TSH SERPL-ACNC: 0.96 UIU/ML
WBC # FLD AUTO: 8.69 K/UL

## 2017-11-08 PROCEDURE — 93975 VASCULAR STUDY: CPT

## 2017-11-08 PROCEDURE — 93975 VASCULAR STUDY: CPT | Mod: 26

## 2017-11-08 RX ORDER — SIMVASTATIN 40 MG/1
40 TABLET, FILM COATED ORAL
Qty: 90 | Refills: 3 | Status: DISCONTINUED | COMMUNITY
Start: 2017-06-06 | End: 2017-11-08

## 2017-11-09 LAB
METANEPHRINE, PL: <10 PG/ML — SIGNIFICANT CHANGE UP (ref 0–62)
NORMETANEPHRINE, PL: 109 PG/ML — SIGNIFICANT CHANGE UP (ref 0–145)

## 2017-11-13 DIAGNOSIS — Z23 ENCOUNTER FOR IMMUNIZATION: ICD-10-CM

## 2017-11-13 DIAGNOSIS — R06.02 SHORTNESS OF BREATH: ICD-10-CM

## 2017-11-13 DIAGNOSIS — M19.90 UNSPECIFIED OSTEOARTHRITIS, UNSPECIFIED SITE: ICD-10-CM

## 2017-11-14 ENCOUNTER — OUTPATIENT (OUTPATIENT)
Dept: OUTPATIENT SERVICES | Facility: HOSPITAL | Age: 62
LOS: 1 days | End: 2017-11-14
Payer: MEDICARE

## 2017-11-14 ENCOUNTER — APPOINTMENT (OUTPATIENT)
Dept: INTERNAL MEDICINE | Facility: CLINIC | Age: 62
End: 2017-11-14

## 2017-11-14 VITALS
RESPIRATION RATE: 16 BRPM | HEART RATE: 82 BPM | WEIGHT: 159 LBS | BODY MASS INDEX: 27.29 KG/M2 | OXYGEN SATURATION: 98 % | DIASTOLIC BLOOD PRESSURE: 80 MMHG | SYSTOLIC BLOOD PRESSURE: 150 MMHG

## 2017-11-14 DIAGNOSIS — M19.90 UNSPECIFIED OSTEOARTHRITIS, UNSPECIFIED SITE: ICD-10-CM

## 2017-11-14 DIAGNOSIS — I10 ESSENTIAL (PRIMARY) HYPERTENSION: ICD-10-CM

## 2017-11-14 PROCEDURE — G0463: CPT

## 2017-11-14 RX ORDER — TRIAMCINOLONE ACETONIDE 1 MG/G
0.1 OINTMENT TOPICAL
Qty: 1 | Refills: 2 | Status: COMPLETED | COMMUNITY
Start: 2017-10-20 | End: 2017-11-14

## 2017-11-14 RX ORDER — VERAPAMIL HYDROCHLORIDE 180 MG/1
180 TABLET ORAL
Qty: 30 | Refills: 0 | Status: COMPLETED | COMMUNITY
Start: 2017-05-26 | End: 2017-11-14

## 2017-11-14 RX ORDER — PROMETHAZINE HYDROCHLORIDE 6.25 MG/5ML
6.25 SOLUTION ORAL
Qty: 120 | Refills: 0 | Status: COMPLETED | COMMUNITY
Start: 2017-06-06 | End: 2017-11-14

## 2017-11-14 RX ORDER — MONTELUKAST 10 MG/1
10 TABLET, FILM COATED ORAL
Qty: 30 | Refills: 0 | Status: COMPLETED | COMMUNITY
Start: 2016-11-21 | End: 2017-11-14

## 2017-11-14 RX ORDER — CYCLOBENZAPRINE HYDROCHLORIDE 10 MG/1
10 TABLET, FILM COATED ORAL
Refills: 0 | Status: COMPLETED | COMMUNITY
Start: 2017-10-17 | End: 2017-11-14

## 2017-11-14 RX ORDER — METOPROLOL SUCCINATE 25 MG/1
25 TABLET, EXTENDED RELEASE ORAL
Qty: 30 | Refills: 0 | Status: COMPLETED | COMMUNITY
Start: 2017-07-18 | End: 2017-11-14

## 2017-11-15 ENCOUNTER — APPOINTMENT (OUTPATIENT)
Dept: ORTHOPEDIC SURGERY | Facility: CLINIC | Age: 62
End: 2017-11-15
Payer: MEDICARE

## 2017-11-15 VITALS
SYSTOLIC BLOOD PRESSURE: 184 MMHG | BODY MASS INDEX: 27.65 KG/M2 | HEIGHT: 63.5 IN | HEART RATE: 83 BPM | DIASTOLIC BLOOD PRESSURE: 100 MMHG | WEIGHT: 158 LBS

## 2017-11-15 VITALS — HEIGHT: 64 IN | BODY MASS INDEX: 27.31 KG/M2 | WEIGHT: 160 LBS

## 2017-11-15 PROCEDURE — 73522 X-RAY EXAM HIPS BI 3-4 VIEWS: CPT

## 2017-11-15 PROCEDURE — 99204 OFFICE O/P NEW MOD 45 MIN: CPT

## 2017-11-22 ENCOUNTER — NON-APPOINTMENT (OUTPATIENT)
Age: 62
End: 2017-11-22

## 2017-11-22 ENCOUNTER — APPOINTMENT (OUTPATIENT)
Dept: CARDIOLOGY | Facility: CLINIC | Age: 62
End: 2017-11-22
Payer: MEDICARE

## 2017-11-22 VITALS
SYSTOLIC BLOOD PRESSURE: 215 MMHG | DIASTOLIC BLOOD PRESSURE: 106 MMHG | HEIGHT: 63.5 IN | OXYGEN SATURATION: 100 % | HEART RATE: 78 BPM

## 2017-11-22 DIAGNOSIS — R70.0 ELEVATED ERYTHROCYTE SEDIMENTATION RATE: ICD-10-CM

## 2017-11-22 PROCEDURE — 99215 OFFICE O/P EST HI 40 MIN: CPT

## 2017-11-22 PROCEDURE — 93000 ELECTROCARDIOGRAM COMPLETE: CPT

## 2017-11-22 RX ORDER — FLUTICASONE PROPIONATE 50 UG/1
50 SPRAY, METERED NASAL TWICE DAILY
Refills: 0 | Status: DISCONTINUED | COMMUNITY
Start: 2017-10-17 | End: 2017-11-22

## 2017-11-22 RX ORDER — MECLIZINE HYDROCHLORIDE 25 MG/1
TABLET ORAL
Refills: 0 | Status: DISCONTINUED | COMMUNITY
End: 2017-11-22

## 2017-11-22 RX ORDER — ESCITALOPRAM OXALATE 10 MG/1
10 TABLET, FILM COATED ORAL DAILY
Refills: 0 | Status: DISCONTINUED | COMMUNITY
Start: 2017-10-17 | End: 2017-11-22

## 2017-11-22 RX ORDER — ATORVASTATIN CALCIUM 40 MG/1
40 TABLET, FILM COATED ORAL
Refills: 0 | Status: DISCONTINUED | COMMUNITY
End: 2017-11-22

## 2017-11-29 ENCOUNTER — APPOINTMENT (OUTPATIENT)
Dept: ULTRASOUND IMAGING | Facility: HOSPITAL | Age: 62
End: 2017-11-29

## 2017-11-29 ENCOUNTER — OUTPATIENT (OUTPATIENT)
Dept: OUTPATIENT SERVICES | Facility: HOSPITAL | Age: 62
LOS: 1 days | End: 2017-11-29
Payer: COMMERCIAL

## 2017-11-29 DIAGNOSIS — E78.5 HYPERLIPIDEMIA, UNSPECIFIED: ICD-10-CM

## 2017-11-29 DIAGNOSIS — R70.0 ELEVATED ERYTHROCYTE SEDIMENTATION RATE: ICD-10-CM

## 2017-11-29 PROCEDURE — 93880 EXTRACRANIAL BILAT STUDY: CPT

## 2017-11-29 PROCEDURE — 93880 EXTRACRANIAL BILAT STUDY: CPT | Mod: 26

## 2017-12-06 ENCOUNTER — APPOINTMENT (OUTPATIENT)
Dept: CV DIAGNOSITCS | Facility: HOSPITAL | Age: 62
End: 2017-12-06

## 2017-12-06 ENCOUNTER — OUTPATIENT (OUTPATIENT)
Dept: OUTPATIENT SERVICES | Facility: HOSPITAL | Age: 62
LOS: 1 days | End: 2017-12-06
Payer: MEDICARE

## 2017-12-06 DIAGNOSIS — I10 ESSENTIAL (PRIMARY) HYPERTENSION: ICD-10-CM

## 2017-12-06 PROCEDURE — 93306 TTE W/DOPPLER COMPLETE: CPT

## 2017-12-06 PROCEDURE — 93306 TTE W/DOPPLER COMPLETE: CPT | Mod: 26

## 2017-12-07 LAB
ALBUMIN SERPL ELPH-MCNC: 4 G/DL
ALP BLD-CCNC: 101 U/L
ALT SERPL-CCNC: 17 U/L
ANION GAP SERPL CALC-SCNC: 12 MMOL/L
AST SERPL-CCNC: 22 U/L
BILIRUB SERPL-MCNC: 0.2 MG/DL
BUN SERPL-MCNC: 17 MG/DL
CALCIUM SERPL-MCNC: 9.5 MG/DL
CHLORIDE SERPL-SCNC: 102 MMOL/L
CO2 SERPL-SCNC: 29 MMOL/L
CREAT SERPL-MCNC: 1.08 MG/DL
GLUCOSE SERPL-MCNC: 84 MG/DL
POTASSIUM SERPL-SCNC: 3.9 MMOL/L
PROT SERPL-MCNC: 7.6 G/DL
SODIUM SERPL-SCNC: 143 MMOL/L

## 2017-12-11 ENCOUNTER — EMERGENCY (EMERGENCY)
Facility: HOSPITAL | Age: 62
LOS: 1 days | Discharge: HOME | End: 2017-12-11
Attending: EMERGENCY MEDICINE | Admitting: EMERGENCY MEDICINE
Payer: MEDICARE

## 2017-12-11 VITALS
HEART RATE: 79 BPM | RESPIRATION RATE: 16 BRPM | TEMPERATURE: 99 F | WEIGHT: 158.95 LBS | DIASTOLIC BLOOD PRESSURE: 107 MMHG | OXYGEN SATURATION: 100 % | SYSTOLIC BLOOD PRESSURE: 199 MMHG

## 2017-12-11 VITALS
TEMPERATURE: 99 F | DIASTOLIC BLOOD PRESSURE: 91 MMHG | SYSTOLIC BLOOD PRESSURE: 198 MMHG | OXYGEN SATURATION: 96 % | HEART RATE: 77 BPM | RESPIRATION RATE: 17 BRPM

## 2017-12-11 LAB
ALBUMIN SERPL ELPH-MCNC: 4 G/DL — SIGNIFICANT CHANGE UP (ref 3.3–5)
ALP SERPL-CCNC: 93 U/L — SIGNIFICANT CHANGE UP (ref 40–120)
ALT FLD-CCNC: 16 U/L RC — SIGNIFICANT CHANGE UP (ref 10–45)
ANION GAP SERPL CALC-SCNC: 13 MMOL/L — SIGNIFICANT CHANGE UP (ref 5–17)
APTT BLD: 32.1 SEC — SIGNIFICANT CHANGE UP (ref 27.5–37.4)
AST SERPL-CCNC: 20 U/L — SIGNIFICANT CHANGE UP (ref 10–40)
BASOPHILS # BLD AUTO: 0.1 K/UL — SIGNIFICANT CHANGE UP (ref 0–0.2)
BASOPHILS NFR BLD AUTO: 0.8 % — SIGNIFICANT CHANGE UP (ref 0–2)
BILIRUB SERPL-MCNC: 0.3 MG/DL — SIGNIFICANT CHANGE UP (ref 0.2–1.2)
BUN SERPL-MCNC: 16 MG/DL — SIGNIFICANT CHANGE UP (ref 7–23)
CALCIUM SERPL-MCNC: 9.1 MG/DL — SIGNIFICANT CHANGE UP (ref 8.4–10.5)
CHLORIDE SERPL-SCNC: 106 MMOL/L — SIGNIFICANT CHANGE UP (ref 96–108)
CO2 SERPL-SCNC: 26 MMOL/L — SIGNIFICANT CHANGE UP (ref 22–31)
CREAT SERPL-MCNC: 0.9 MG/DL — SIGNIFICANT CHANGE UP (ref 0.5–1.3)
EOSINOPHIL # BLD AUTO: 0.2 K/UL — SIGNIFICANT CHANGE UP (ref 0–0.5)
EOSINOPHIL NFR BLD AUTO: 1.4 % — SIGNIFICANT CHANGE UP (ref 0–6)
ERYTHROCYTE [SEDIMENTATION RATE] IN BLOOD: 44 MM/HR — HIGH (ref 0–20)
GAS PNL BLDV: SIGNIFICANT CHANGE UP
GLUCOSE SERPL-MCNC: 80 MG/DL — SIGNIFICANT CHANGE UP (ref 70–99)
HCT VFR BLD CALC: 37.2 % — SIGNIFICANT CHANGE UP (ref 34.5–45)
HGB BLD-MCNC: 12.9 G/DL — SIGNIFICANT CHANGE UP (ref 11.5–15.5)
INR BLD: 0.89 RATIO — SIGNIFICANT CHANGE UP (ref 0.88–1.16)
LYMPHOCYTES # BLD AUTO: 27.2 % — SIGNIFICANT CHANGE UP (ref 13–44)
LYMPHOCYTES # BLD AUTO: 3 K/UL — SIGNIFICANT CHANGE UP (ref 1–3.3)
MCHC RBC-ENTMCNC: 30.2 PG — SIGNIFICANT CHANGE UP (ref 27–34)
MCHC RBC-ENTMCNC: 34.6 GM/DL — SIGNIFICANT CHANGE UP (ref 32–36)
MCV RBC AUTO: 87.4 FL — SIGNIFICANT CHANGE UP (ref 80–100)
MONOCYTES # BLD AUTO: 0.7 K/UL — SIGNIFICANT CHANGE UP (ref 0–0.9)
MONOCYTES NFR BLD AUTO: 6.4 % — SIGNIFICANT CHANGE UP (ref 2–14)
NEUTROPHILS # BLD AUTO: 7 K/UL — SIGNIFICANT CHANGE UP (ref 1.8–7.4)
NEUTROPHILS NFR BLD AUTO: 64.1 % — SIGNIFICANT CHANGE UP (ref 43–77)
NT-PROBNP SERPL-SCNC: 60 PG/ML — SIGNIFICANT CHANGE UP (ref 0–300)
PLATELET # BLD AUTO: 263 K/UL — SIGNIFICANT CHANGE UP (ref 150–400)
POTASSIUM SERPL-MCNC: 3.8 MMOL/L — SIGNIFICANT CHANGE UP (ref 3.5–5.3)
POTASSIUM SERPL-SCNC: 3.8 MMOL/L — SIGNIFICANT CHANGE UP (ref 3.5–5.3)
PROT SERPL-MCNC: 7.3 G/DL — SIGNIFICANT CHANGE UP (ref 6–8.3)
PROTHROM AB SERPL-ACNC: 9.6 SEC — LOW (ref 9.8–12.7)
RBC # BLD: 4.26 M/UL — SIGNIFICANT CHANGE UP (ref 3.8–5.2)
RBC # FLD: 11.9 % — SIGNIFICANT CHANGE UP (ref 10.3–14.5)
SODIUM SERPL-SCNC: 145 MMOL/L — SIGNIFICANT CHANGE UP (ref 135–145)
URATE SERPL-MCNC: 3.9 MG/DL — SIGNIFICANT CHANGE UP (ref 2.5–7)
WBC # BLD: 10.9 K/UL — HIGH (ref 3.8–10.5)
WBC # FLD AUTO: 10.9 K/UL — HIGH (ref 3.8–10.5)

## 2017-12-11 PROCEDURE — 73564 X-RAY EXAM KNEE 4 OR MORE: CPT | Mod: 26,RT

## 2017-12-11 PROCEDURE — 73590 X-RAY EXAM OF LOWER LEG: CPT | Mod: 26,RT

## 2017-12-11 PROCEDURE — 73552 X-RAY EXAM OF FEMUR 2/>: CPT | Mod: 26,RT

## 2017-12-11 PROCEDURE — 85027 COMPLETE CBC AUTOMATED: CPT

## 2017-12-11 PROCEDURE — 99284 EMERGENCY DEPT VISIT MOD MDM: CPT | Mod: 25

## 2017-12-11 PROCEDURE — 85652 RBC SED RATE AUTOMATED: CPT

## 2017-12-11 PROCEDURE — 73552 X-RAY EXAM OF FEMUR 2/>: CPT

## 2017-12-11 PROCEDURE — 83880 ASSAY OF NATRIURETIC PEPTIDE: CPT

## 2017-12-11 PROCEDURE — 84550 ASSAY OF BLOOD/URIC ACID: CPT

## 2017-12-11 PROCEDURE — 83605 ASSAY OF LACTIC ACID: CPT

## 2017-12-11 PROCEDURE — 80053 COMPREHEN METABOLIC PANEL: CPT

## 2017-12-11 PROCEDURE — 84295 ASSAY OF SERUM SODIUM: CPT

## 2017-12-11 PROCEDURE — 82435 ASSAY OF BLOOD CHLORIDE: CPT

## 2017-12-11 PROCEDURE — 85610 PROTHROMBIN TIME: CPT

## 2017-12-11 PROCEDURE — 93971 EXTREMITY STUDY: CPT

## 2017-12-11 PROCEDURE — 99285 EMERGENCY DEPT VISIT HI MDM: CPT

## 2017-12-11 PROCEDURE — 85730 THROMBOPLASTIN TIME PARTIAL: CPT

## 2017-12-11 PROCEDURE — 82803 BLOOD GASES ANY COMBINATION: CPT

## 2017-12-11 PROCEDURE — 73564 X-RAY EXAM KNEE 4 OR MORE: CPT

## 2017-12-11 PROCEDURE — 73610 X-RAY EXAM OF ANKLE: CPT

## 2017-12-11 PROCEDURE — 82330 ASSAY OF CALCIUM: CPT

## 2017-12-11 PROCEDURE — 73590 X-RAY EXAM OF LOWER LEG: CPT

## 2017-12-11 PROCEDURE — 85014 HEMATOCRIT: CPT

## 2017-12-11 PROCEDURE — 73610 X-RAY EXAM OF ANKLE: CPT | Mod: 26,RT

## 2017-12-11 PROCEDURE — 84132 ASSAY OF SERUM POTASSIUM: CPT

## 2017-12-11 PROCEDURE — 82947 ASSAY GLUCOSE BLOOD QUANT: CPT

## 2017-12-11 RX ORDER — OXYCODONE HYDROCHLORIDE 5 MG/1
1 TABLET ORAL
Qty: 12 | Refills: 0 | OUTPATIENT
Start: 2017-12-11 | End: 2017-12-13

## 2017-12-11 RX ORDER — IBUPROFEN 200 MG
600 TABLET ORAL ONCE
Qty: 0 | Refills: 0 | Status: COMPLETED | OUTPATIENT
Start: 2017-12-11 | End: 2017-12-11

## 2017-12-11 RX ADMIN — Medication 600 MILLIGRAM(S): at 16:57

## 2017-12-11 NOTE — ED PROVIDER NOTE - MUSCULOSKELETAL, MLM
Spine appears normal, swelling of R knee with swelling of pretibia and R ankle swellng.  TTP of R medial knee.  No joint instability, +valgus stress test.

## 2017-12-11 NOTE — ED PROVIDER NOTE - OBJECTIVE STATEMENT
61 y/o F with pmh of HTN, HLD, asthma, depression, anxiety presents with 61 y/o F with pmh of HTN, HLD, asthma, depression, anxiety, BL knee arthritis with chronic pain presents with 6 week h/o pain and swelling of R knee down to R ankle with TTP of medial R knee.  Fell 6 weeks ago during hypertensive episode, seen at [facility], was treated had w/u of knee and ankle which were negative.  Symptoms worsened over last several days so came to ED.  Denies fever, chills, h/o gout, recent falls, recent trauma, weakness, loss of sensation, rash, warmth, redness.  Able to walk with cane.

## 2017-12-11 NOTE — ED PROVIDER NOTE - ATTENDING CONTRIBUTION TO CARE
Attending Note (Alejandra): patient complaining of b/l knee pain and swelling. concern for knee injury/ligament strain, r/o dvt.  US performed. negative, repeat in 5-7 days.

## 2017-12-11 NOTE — ED PROVIDER NOTE - CARE PLAN
Principal Discharge DX:	Right knee pain  Instructions for follow-up, activity and diet:	1) You were here for knee pain.  Please see your attached results.    2) Take motrin and tylenol for your pain.  For breakthrough pain not treated by motrin and tylenol, use the oxycodone prescription I provided.    3) Follow up with your primary doctor for further evaluation and to answer any questions you have.    4) Return to the emergency department if you experience worsening symptoms, pain, fever, chills, nausea, vomiting or other concerning symptoms.

## 2017-12-11 NOTE — ED PROVIDER NOTE - MEDICAL DECISION MAKING DETAILS
Likely MCL injury vs. meniscal injury.  +valgus test.  Will get labs, xray, ultrasound given swelling, pain.  ESR given swelling and pain although no fever, no warmth, no rash to indicate septic joint.  Will get urate although low suspicion for gout given exam.  Will give motrin for pain.

## 2017-12-11 NOTE — ED PROVIDER NOTE - PLAN OF CARE
1) You were here for knee pain.  Please see your attached results.    2) Take motrin and tylenol for your pain.  For breakthrough pain not treated by motrin and tylenol, use the oxycodone prescription I provided.    3) Follow up with your primary doctor for further evaluation and to answer any questions you have.    4) Return to the emergency department if you experience worsening symptoms, pain, fever, chills, nausea, vomiting or other concerning symptoms.

## 2017-12-11 NOTE — ED ADULT NURSE NOTE - OBJECTIVE STATEMENT
62 yr old female with  from home with c/o R medial joint line pain  x since saturday, s/p 2 falls onto R knee x 2 mos ago, xray neg for fx at [facility], did some walking in the city over the weekend, +negotiated stairs, now with severe R knee pain, grossly limited AROM due to pain, +swelling down to ankle, no deformity noted

## 2017-12-12 RX ORDER — OXYCODONE HYDROCHLORIDE 5 MG/1
1 TABLET ORAL
Qty: 12 | Refills: 0
Start: 2017-12-12 | End: 2017-12-14

## 2017-12-12 NOTE — ED POST DISCHARGE NOTE - ADDITIONAL DOCUMENTATION
received call from [facility], didn't receive e-prescription for oxycodone. as per computer- shows sent. will resend with instruction as originally intended by the prescriber that cared for the patient. I did not evaluate/treat this patient directly.

## 2017-12-13 NOTE — ED ADULT NURSE NOTE - MUSCULOSKELETAL WDL
abnormal/expand... Full range of motion of upper and lower extremities, no joint tenderness/swelling.

## 2017-12-14 ENCOUNTER — APPOINTMENT (OUTPATIENT)
Dept: INTERNAL MEDICINE | Facility: CLINIC | Age: 62
End: 2017-12-14

## 2017-12-14 ENCOUNTER — OUTPATIENT (OUTPATIENT)
Dept: OUTPATIENT SERVICES | Facility: HOSPITAL | Age: 62
LOS: 1 days | End: 2017-12-14
Payer: COMMERCIAL

## 2017-12-14 VITALS
HEIGHT: 63.5 IN | WEIGHT: 166 LBS | OXYGEN SATURATION: 98 % | BODY MASS INDEX: 29.05 KG/M2 | HEART RATE: 83 BPM | DIASTOLIC BLOOD PRESSURE: 100 MMHG | SYSTOLIC BLOOD PRESSURE: 190 MMHG

## 2017-12-14 DIAGNOSIS — I10 ESSENTIAL (PRIMARY) HYPERTENSION: ICD-10-CM

## 2017-12-14 PROCEDURE — G0463: CPT

## 2017-12-19 ENCOUNTER — APPOINTMENT (OUTPATIENT)
Dept: CARDIOLOGY | Facility: CLINIC | Age: 62
End: 2017-12-19
Payer: MEDICARE

## 2017-12-20 DIAGNOSIS — M17.11 UNILATERAL PRIMARY OSTEOARTHRITIS, RIGHT KNEE: ICD-10-CM

## 2018-01-02 ENCOUNTER — NON-APPOINTMENT (OUTPATIENT)
Age: 63
End: 2018-01-02

## 2018-01-02 ENCOUNTER — APPOINTMENT (OUTPATIENT)
Dept: CARDIOLOGY | Facility: CLINIC | Age: 63
End: 2018-01-02
Payer: MEDICARE

## 2018-01-02 VITALS — SYSTOLIC BLOOD PRESSURE: 133 MMHG | DIASTOLIC BLOOD PRESSURE: 76 MMHG | HEART RATE: 72 BPM | OXYGEN SATURATION: 98 %

## 2018-01-02 PROCEDURE — 93000 ELECTROCARDIOGRAM COMPLETE: CPT

## 2018-01-02 PROCEDURE — 99215 OFFICE O/P EST HI 40 MIN: CPT

## 2018-01-11 ENCOUNTER — APPOINTMENT (OUTPATIENT)
Dept: ORTHOPEDIC SURGERY | Facility: CLINIC | Age: 63
End: 2018-01-11
Payer: MEDICARE

## 2018-01-11 PROCEDURE — 99214 OFFICE O/P EST MOD 30 MIN: CPT

## 2018-01-12 ENCOUNTER — APPOINTMENT (OUTPATIENT)
Dept: INTERNAL MEDICINE | Facility: CLINIC | Age: 63
End: 2018-01-12

## 2018-01-12 ENCOUNTER — LABORATORY RESULT (OUTPATIENT)
Age: 63
End: 2018-01-12

## 2018-01-12 ENCOUNTER — OUTPATIENT (OUTPATIENT)
Dept: OUTPATIENT SERVICES | Facility: HOSPITAL | Age: 63
LOS: 1 days | End: 2018-01-12
Payer: COMMERCIAL

## 2018-01-12 VITALS
SYSTOLIC BLOOD PRESSURE: 160 MMHG | DIASTOLIC BLOOD PRESSURE: 80 MMHG | BODY MASS INDEX: 27.47 KG/M2 | OXYGEN SATURATION: 97 % | WEIGHT: 157 LBS | HEART RATE: 64 BPM | HEIGHT: 63.5 IN

## 2018-01-12 DIAGNOSIS — L29.9 PRURITUS, UNSPECIFIED: ICD-10-CM

## 2018-01-12 DIAGNOSIS — R06.02 SHORTNESS OF BREATH: ICD-10-CM

## 2018-01-12 DIAGNOSIS — Z23 ENCOUNTER FOR IMMUNIZATION: ICD-10-CM

## 2018-01-12 DIAGNOSIS — I10 ESSENTIAL (PRIMARY) HYPERTENSION: ICD-10-CM

## 2018-01-12 LAB
CHOLEST SERPL-MCNC: 221 MG/DL — HIGH (ref 10–199)
HDLC SERPL-MCNC: 35 MG/DL — LOW (ref 40–125)
LIPID PNL WITH DIRECT LDL SERPL: 109 MG/DL — SIGNIFICANT CHANGE UP
TOTAL CHOLESTEROL/HDL RATIO MEASUREMENT: 6.3 RATIO — SIGNIFICANT CHANGE UP (ref 3.3–7.1)
TRIGL SERPL-MCNC: 387 MG/DL — HIGH (ref 10–149)

## 2018-01-12 RX ORDER — LABETALOL HYDROCHLORIDE 200 MG/1
200 TABLET, FILM COATED ORAL
Qty: 180 | Refills: 0 | Status: COMPLETED | COMMUNITY
Start: 2017-05-30 | End: 2018-01-12

## 2018-01-12 RX ORDER — METHYLPREDNISOLONE 4 MG/1
4 TABLET ORAL
Qty: 1 | Refills: 0 | Status: COMPLETED | COMMUNITY
Start: 2017-12-14 | End: 2018-01-12

## 2018-01-12 RX ORDER — CARVEDILOL 6.25 MG/1
6.25 TABLET, FILM COATED ORAL TWICE DAILY
Qty: 60 | Refills: 1 | Status: COMPLETED | COMMUNITY
Start: 2017-10-17 | End: 2018-01-12

## 2018-01-12 RX ORDER — OXYCODONE HYDROCHLORIDE 5 MG/1
5 CAPSULE ORAL
Qty: 12 | Refills: 0 | Status: COMPLETED | COMMUNITY
Start: 2017-12-12 | End: 2018-01-12

## 2018-01-19 DIAGNOSIS — E78.5 HYPERLIPIDEMIA, UNSPECIFIED: ICD-10-CM

## 2018-01-23 ENCOUNTER — LABORATORY RESULT (OUTPATIENT)
Age: 63
End: 2018-01-23

## 2018-01-23 LAB — OB PNL STL IA: NEGATIVE — SIGNIFICANT CHANGE UP

## 2018-01-23 PROCEDURE — 80061 LIPID PANEL: CPT

## 2018-01-23 PROCEDURE — G0463: CPT

## 2018-01-23 PROCEDURE — 82274 ASSAY TEST FOR BLOOD FECAL: CPT

## 2018-01-26 ENCOUNTER — OUTPATIENT (OUTPATIENT)
Dept: OUTPATIENT SERVICES | Facility: HOSPITAL | Age: 63
LOS: 1 days | End: 2018-01-26
Payer: COMMERCIAL

## 2018-01-26 ENCOUNTER — APPOINTMENT (OUTPATIENT)
Dept: INTERNAL MEDICINE | Facility: CLINIC | Age: 63
End: 2018-01-26
Payer: MEDICARE

## 2018-01-26 VITALS
HEIGHT: 63.5 IN | SYSTOLIC BLOOD PRESSURE: 161 MMHG | WEIGHT: 159 LBS | BODY MASS INDEX: 27.82 KG/M2 | HEART RATE: 74 BPM | DIASTOLIC BLOOD PRESSURE: 90 MMHG

## 2018-01-26 DIAGNOSIS — I10 ESSENTIAL (PRIMARY) HYPERTENSION: ICD-10-CM

## 2018-01-26 PROCEDURE — 99213 OFFICE O/P EST LOW 20 MIN: CPT | Mod: GE

## 2018-01-26 PROCEDURE — G0463: CPT

## 2018-01-30 ENCOUNTER — LABORATORY RESULT (OUTPATIENT)
Age: 63
End: 2018-01-30

## 2018-01-30 ENCOUNTER — APPOINTMENT (OUTPATIENT)
Dept: OPHTHALMOLOGY | Facility: CLINIC | Age: 63
End: 2018-01-30
Payer: MEDICARE

## 2018-01-30 DIAGNOSIS — H25.013 CORTICAL AGE-RELATED CATARACT, BILATERAL: ICD-10-CM

## 2018-01-30 PROCEDURE — 92004 COMPRE OPH EXAM NEW PT 1/>: CPT

## 2018-01-31 ENCOUNTER — APPOINTMENT (OUTPATIENT)
Dept: OBGYN | Facility: CLINIC | Age: 63
End: 2018-01-31
Payer: MEDICARE

## 2018-01-31 ENCOUNTER — OUTPATIENT (OUTPATIENT)
Dept: OUTPATIENT SERVICES | Facility: HOSPITAL | Age: 63
LOS: 1 days | End: 2018-01-31
Payer: COMMERCIAL

## 2018-01-31 ENCOUNTER — LABORATORY RESULT (OUTPATIENT)
Age: 63
End: 2018-01-31

## 2018-01-31 DIAGNOSIS — R33.9 RETENTION OF URINE, UNSPECIFIED: ICD-10-CM

## 2018-01-31 DIAGNOSIS — N76.0 ACUTE VAGINITIS: ICD-10-CM

## 2018-01-31 DIAGNOSIS — Z01.419 ENCOUNTER FOR GYNECOLOGICAL EXAMINATION (GENERAL) (ROUTINE) WITHOUT ABNORMAL FINDINGS: ICD-10-CM

## 2018-01-31 PROCEDURE — 99386 PREV VISIT NEW AGE 40-64: CPT | Mod: NC

## 2018-02-01 LAB
C TRACH RRNA SPEC QL NAA+PROBE: SIGNIFICANT CHANGE UP
HIV 1+2 AB+HIV1 P24 AG SERPL QL IA: SIGNIFICANT CHANGE UP
HPV HIGH+LOW RISK DNA PNL CVX: SIGNIFICANT CHANGE UP
N GONORRHOEA RRNA SPEC QL NAA+PROBE: SIGNIFICANT CHANGE UP
SPECIMEN SOURCE: SIGNIFICANT CHANGE UP

## 2018-02-01 PROCEDURE — 86780 TREPONEMA PALLIDUM: CPT

## 2018-02-01 PROCEDURE — 88175 CYTOPATH C/V AUTO FLUID REDO: CPT

## 2018-02-01 PROCEDURE — 87389 HIV-1 AG W/HIV-1&-2 AB AG IA: CPT

## 2018-02-01 PROCEDURE — G0463: CPT

## 2018-02-01 PROCEDURE — 87624 HPV HI-RISK TYP POOLED RSLT: CPT

## 2018-02-02 LAB — T PALLIDUM AB TITR SER: NEGATIVE — SIGNIFICANT CHANGE UP

## 2018-02-03 LAB — CYTOLOGY SPEC DOC CYTO: SIGNIFICANT CHANGE UP

## 2018-02-07 DIAGNOSIS — E78.5 HYPERLIPIDEMIA, UNSPECIFIED: ICD-10-CM

## 2018-02-09 ENCOUNTER — APPOINTMENT (OUTPATIENT)
Dept: INTERNAL MEDICINE | Facility: CLINIC | Age: 63
End: 2018-02-09
Payer: MEDICARE

## 2018-02-09 ENCOUNTER — OUTPATIENT (OUTPATIENT)
Dept: OUTPATIENT SERVICES | Facility: HOSPITAL | Age: 63
LOS: 1 days | End: 2018-02-09
Payer: MEDICARE

## 2018-02-09 VITALS
SYSTOLIC BLOOD PRESSURE: 160 MMHG | WEIGHT: 159 LBS | HEIGHT: 63.5 IN | BODY MASS INDEX: 27.82 KG/M2 | DIASTOLIC BLOOD PRESSURE: 100 MMHG

## 2018-02-09 DIAGNOSIS — I10 ESSENTIAL (PRIMARY) HYPERTENSION: ICD-10-CM

## 2018-02-09 PROCEDURE — G0463: CPT

## 2018-02-09 PROCEDURE — 99213 OFFICE O/P EST LOW 20 MIN: CPT | Mod: GE

## 2018-02-09 RX ORDER — HYDROCHLOROTHIAZIDE 25 MG/1
25 TABLET ORAL DAILY
Qty: 1 | Refills: 2 | Status: DISCONTINUED | COMMUNITY
Start: 2017-10-17 | End: 2018-02-09

## 2018-02-09 RX ORDER — METOPROLOL TARTRATE 25 MG/1
25 TABLET, FILM COATED ORAL
Qty: 60 | Refills: 3 | Status: DISCONTINUED | COMMUNITY
Start: 2017-10-17 | End: 2018-02-09

## 2018-03-02 ENCOUNTER — APPOINTMENT (OUTPATIENT)
Dept: INTERNAL MEDICINE | Facility: CLINIC | Age: 63
End: 2018-03-02
Payer: MEDICARE

## 2018-03-02 ENCOUNTER — OUTPATIENT (OUTPATIENT)
Dept: OUTPATIENT SERVICES | Facility: HOSPITAL | Age: 63
LOS: 1 days | End: 2018-03-02
Payer: MEDICARE

## 2018-03-02 VITALS
WEIGHT: 157 LBS | BODY MASS INDEX: 27.47 KG/M2 | SYSTOLIC BLOOD PRESSURE: 148 MMHG | HEIGHT: 63.5 IN | OXYGEN SATURATION: 97 % | HEART RATE: 66 BPM | DIASTOLIC BLOOD PRESSURE: 90 MMHG

## 2018-03-02 DIAGNOSIS — I10 ESSENTIAL (PRIMARY) HYPERTENSION: ICD-10-CM

## 2018-03-02 PROCEDURE — 99213 OFFICE O/P EST LOW 20 MIN: CPT | Mod: GE

## 2018-03-02 PROCEDURE — G0463: CPT

## 2018-03-03 VITALS — SYSTOLIC BLOOD PRESSURE: 156 MMHG | DIASTOLIC BLOOD PRESSURE: 88 MMHG

## 2018-03-29 ENCOUNTER — RX RENEWAL (OUTPATIENT)
Age: 63
End: 2018-03-29

## 2018-04-26 ENCOUNTER — APPOINTMENT (OUTPATIENT)
Dept: DERMATOLOGY | Facility: HOSPITAL | Age: 63
End: 2018-04-26
Payer: MEDICARE

## 2018-04-26 ENCOUNTER — OUTPATIENT (OUTPATIENT)
Dept: OUTPATIENT SERVICES | Facility: HOSPITAL | Age: 63
LOS: 1 days | End: 2018-04-26
Payer: MEDICARE

## 2018-04-26 VITALS — HEIGHT: 63.5 IN | BODY MASS INDEX: 27.47 KG/M2 | WEIGHT: 157 LBS

## 2018-04-26 VITALS — DIASTOLIC BLOOD PRESSURE: 107 MMHG | HEART RATE: 67 BPM | RESPIRATION RATE: 16 BRPM | SYSTOLIC BLOOD PRESSURE: 177 MMHG

## 2018-04-26 DIAGNOSIS — L30.9 DERMATITIS, UNSPECIFIED: ICD-10-CM

## 2018-04-26 DIAGNOSIS — L98.9 DISORDER OF THE SKIN AND SUBCUTANEOUS TISSUE, UNSPECIFIED: ICD-10-CM

## 2018-04-26 DIAGNOSIS — Z87.09 PERSONAL HISTORY OF OTHER DISEASES OF THE RESPIRATORY SYSTEM: ICD-10-CM

## 2018-04-26 DIAGNOSIS — Z86.69 PERSONAL HISTORY OF OTHER DISEASES OF THE NERVOUS SYSTEM AND SENSE ORGANS: ICD-10-CM

## 2018-04-26 DIAGNOSIS — Z86.39 PERSONAL HISTORY OF OTHER ENDOCRINE, NUTRITIONAL AND METABOLIC DISEASE: ICD-10-CM

## 2018-04-26 DIAGNOSIS — Z87.39 PERSONAL HISTORY OF OTHER DISEASES OF THE MUSCULOSKELETAL SYSTEM AND CONNECTIVE TISSUE: ICD-10-CM

## 2018-04-26 DIAGNOSIS — M17.10 UNILATERAL PRIMARY OSTEOARTHRITIS, UNSPECIFIED KNEE: ICD-10-CM

## 2018-04-26 PROCEDURE — G0463: CPT

## 2018-04-26 PROCEDURE — 99213 OFFICE O/P EST LOW 20 MIN: CPT | Mod: NC

## 2018-04-27 DIAGNOSIS — L30.9 DERMATITIS, UNSPECIFIED: ICD-10-CM

## 2018-05-09 ENCOUNTER — OUTPATIENT (OUTPATIENT)
Dept: OUTPATIENT SERVICES | Facility: HOSPITAL | Age: 63
LOS: 1 days | End: 2018-05-09
Payer: MEDICARE

## 2018-05-09 ENCOUNTER — APPOINTMENT (OUTPATIENT)
Dept: INTERNAL MEDICINE | Facility: CLINIC | Age: 63
End: 2018-05-09
Payer: MEDICARE

## 2018-05-09 VITALS
SYSTOLIC BLOOD PRESSURE: 155 MMHG | DIASTOLIC BLOOD PRESSURE: 99 MMHG | WEIGHT: 157 LBS | OXYGEN SATURATION: 98 % | HEART RATE: 68 BPM | BODY MASS INDEX: 27.38 KG/M2

## 2018-05-09 DIAGNOSIS — L60.0 INGROWING NAIL: ICD-10-CM

## 2018-05-09 DIAGNOSIS — I10 ESSENTIAL (PRIMARY) HYPERTENSION: ICD-10-CM

## 2018-05-09 PROCEDURE — 99213 OFFICE O/P EST LOW 20 MIN: CPT | Mod: GE

## 2018-05-09 PROCEDURE — G0463: CPT

## 2018-05-11 ENCOUNTER — APPOINTMENT (OUTPATIENT)
Dept: INTERNAL MEDICINE | Facility: CLINIC | Age: 63
End: 2018-05-11
Payer: MEDICARE

## 2018-05-11 ENCOUNTER — OUTPATIENT (OUTPATIENT)
Dept: OUTPATIENT SERVICES | Facility: HOSPITAL | Age: 63
LOS: 1 days | End: 2018-05-11
Payer: MEDICARE

## 2018-05-11 VITALS
DIASTOLIC BLOOD PRESSURE: 90 MMHG | OXYGEN SATURATION: 95 % | WEIGHT: 158 LBS | HEIGHT: 63.5 IN | SYSTOLIC BLOOD PRESSURE: 132 MMHG | BODY MASS INDEX: 27.65 KG/M2 | HEART RATE: 68 BPM

## 2018-05-11 DIAGNOSIS — I10 ESSENTIAL (PRIMARY) HYPERTENSION: ICD-10-CM

## 2018-05-11 DIAGNOSIS — J45.20 MILD INTERMITTENT ASTHMA, UNCOMPLICATED: ICD-10-CM

## 2018-05-11 PROCEDURE — 99213 OFFICE O/P EST LOW 20 MIN: CPT | Mod: GE

## 2018-05-11 PROCEDURE — G0463: CPT

## 2018-05-13 PROBLEM — J45.20 INTERMITTENT ASTHMA: Status: ACTIVE | Noted: 2018-05-11

## 2018-05-14 DIAGNOSIS — L60.0 INGROWING NAIL: ICD-10-CM

## 2018-05-17 DIAGNOSIS — L60.0 INGROWING NAIL: ICD-10-CM

## 2018-05-31 ENCOUNTER — RX RENEWAL (OUTPATIENT)
Age: 63
End: 2018-05-31

## 2018-06-13 NOTE — ED ADULT TRIAGE NOTE - CCCP TRG CHIEF CMPLNT
Problem: Patient Care Overview  Goal: Plan of Care Review  Outcome: Ongoing (interventions implemented as appropriate)  Patient on RA with sats as documented.  Will continue to monitor.          headache, palpitations X5 days

## 2018-07-05 ENCOUNTER — EMERGENCY (EMERGENCY)
Facility: HOSPITAL | Age: 63
LOS: 1 days | Discharge: HOME | End: 2018-07-05
Attending: EMERGENCY MEDICINE | Admitting: EMERGENCY MEDICINE
Payer: MEDICARE

## 2018-07-05 ENCOUNTER — APPOINTMENT (OUTPATIENT)
Dept: NEPHROLOGY | Facility: CLINIC | Age: 63
End: 2018-07-05
Payer: MEDICARE

## 2018-07-05 VITALS
HEIGHT: 64 IN | RESPIRATION RATE: 18 BRPM | OXYGEN SATURATION: 99 % | HEART RATE: 68 BPM | DIASTOLIC BLOOD PRESSURE: 99 MMHG | TEMPERATURE: 98 F | WEIGHT: 162.92 LBS | SYSTOLIC BLOOD PRESSURE: 229 MMHG

## 2018-07-05 VITALS — DIASTOLIC BLOOD PRESSURE: 90 MMHG | SYSTOLIC BLOOD PRESSURE: 210 MMHG

## 2018-07-05 VITALS
SYSTOLIC BLOOD PRESSURE: 199 MMHG | HEART RATE: 64 BPM | HEIGHT: 63.5 IN | BODY MASS INDEX: 28.55 KG/M2 | DIASTOLIC BLOOD PRESSURE: 113 MMHG | WEIGHT: 163.14 LBS | OXYGEN SATURATION: 99 %

## 2018-07-05 VITALS
OXYGEN SATURATION: 100 % | SYSTOLIC BLOOD PRESSURE: 187 MMHG | DIASTOLIC BLOOD PRESSURE: 89 MMHG | RESPIRATION RATE: 16 BRPM | HEART RATE: 62 BPM

## 2018-07-05 PROCEDURE — 99283 EMERGENCY DEPT VISIT LOW MDM: CPT

## 2018-07-05 PROCEDURE — 99205 OFFICE O/P NEW HI 60 MIN: CPT

## 2018-07-05 PROCEDURE — 99284 EMERGENCY DEPT VISIT MOD MDM: CPT

## 2018-07-05 RX ORDER — HYDRALAZINE HCL 50 MG
25 TABLET ORAL ONCE
Qty: 0 | Refills: 0 | Status: COMPLETED | OUTPATIENT
Start: 2018-07-05 | End: 2018-07-05

## 2018-07-05 RX ORDER — HYDRALAZINE HCL 50 MG
1 TABLET ORAL
Qty: 10 | Refills: 0
Start: 2018-07-05 | End: 2018-07-09

## 2018-07-05 RX ADMIN — Medication 25 MILLIGRAM(S): at 11:40

## 2018-07-05 NOTE — ED PROVIDER NOTE - CARE PLAN
Principal Discharge DX:	HTN (hypertension)  Assessment and plan of treatment:	1. Follow up with your primary care physician within 2-3days for reevaluation.  2.  Return to the Emergency Department for worsening, progressive or any other concerning symptoms.   3.  Take medications as prescribed.

## 2018-07-05 NOTE — ED PROVIDER NOTE - PLAN OF CARE
1. Follow up with your primary care physician within 2-3days for reevaluation.  2.  Return to the Emergency Department for worsening, progressive or any other concerning symptoms.   3.  Take medications as prescribed.

## 2018-07-05 NOTE — ED ADULT NURSE NOTE - OBJECTIVE STATEMENT
63 y f came to the ed c/o htn. was at her md office who sent her to the ed b/c of uncontrolled htn. states taking many medications for her htn. went to renal md to help bring down her pressure. patient denies any complaints. denies headache, fever, chills, chest pain, sob, blurry vision, unsteady gait. skin is warm and dry.

## 2018-07-05 NOTE — ED PROVIDER NOTE - MEDICAL DECISION MAKING DETAILS
64yo female with asymptomatic HTN, will give PO hydralazine, reassess. Radha Gonzalez DO 64yo female with asymptomatic HTN, will give PO hydralazine, reassess. Radha Murrieta: Patient with hypertension, taken off of medications, sent in from nephrologist office for elevated bp. Patient denies headache, no cp, no sob, no double vision. was on hydralazine. had normal renal sono.  will give hydralazine and reassess. likely outpatient f/u.

## 2018-07-05 NOTE — ED PROVIDER NOTE - OBJECTIVE STATEMENT
62yo female PMH HTN, hyperlipidemia, asthma presenting from nephrologist's office with asymptomatic HTN. Patient was referred to nephrologist for uncontrolled HTN. She used to be on hydralazine but discontinued it because her BP's were improved, now currently on amlodipine, HCTZ, and metoprolol. Patient took BP meds this AM. Was taking NSAID's for knee pain but instructed to stop today. No nausea or vomiting, no blurred or double vision, no chest pain or shortness of breath. Urinating fine. Reportedly had US kidneys performed recently which was normal.     PMD:   Nephro: Umberto Shepherd

## 2018-07-05 NOTE — ED PROVIDER NOTE - PROGRESS NOTE DETAILS
BP improved. patient remains asymptomatic. Bloodwork drawn in Dr. Shepherd's office. Will DC home with hydralazine and instructions to follow up with PMD. Radha Gonzalez DO

## 2018-07-05 NOTE — ED ADULT TRIAGE NOTE - CHIEF COMPLAINT QUOTE
patient was at nephrologist office (Joao)  this morning, bp was elevated. sent in for elevated BP.   history of htn, took medication today. patient is not experiencing any symptoms feeling her normal self.

## 2018-07-06 ENCOUNTER — APPOINTMENT (OUTPATIENT)
Dept: INTERNAL MEDICINE | Facility: CLINIC | Age: 63
End: 2018-07-06
Payer: MEDICARE

## 2018-07-06 ENCOUNTER — OUTPATIENT (OUTPATIENT)
Dept: OUTPATIENT SERVICES | Facility: HOSPITAL | Age: 63
LOS: 1 days | End: 2018-07-06
Payer: MEDICARE

## 2018-07-06 VITALS
SYSTOLIC BLOOD PRESSURE: 180 MMHG | BODY MASS INDEX: 28.17 KG/M2 | DIASTOLIC BLOOD PRESSURE: 90 MMHG | HEART RATE: 66 BPM | HEIGHT: 63.5 IN | WEIGHT: 161 LBS

## 2018-07-06 DIAGNOSIS — I10 ESSENTIAL (PRIMARY) HYPERTENSION: ICD-10-CM

## 2018-07-06 DIAGNOSIS — Z82.49 FAMILY HISTORY OF ISCHEMIC HEART DISEASE AND OTHER DISEASES OF THE CIRCULATORY SYSTEM: ICD-10-CM

## 2018-07-06 LAB
ALBUMIN SERPL ELPH-MCNC: 4.3 G/DL
ALDOSTERONE SERUM: 17.7 NG/DL
ANION GAP SERPL CALC-SCNC: 14 MMOL/L
APPEARANCE: CLEAR
BACTERIA: NEGATIVE
BILIRUBIN URINE: NEGATIVE
BLOOD URINE: NEGATIVE
BUN SERPL-MCNC: 18 MG/DL
CALCIUM SERPL-MCNC: 9.5 MG/DL
CHLORIDE SERPL-SCNC: 104 MMOL/L
CO2 SERPL-SCNC: 26 MMOL/L
COLOR: YELLOW
CREAT SERPL-MCNC: 0.94 MG/DL
CREAT SPEC-SCNC: 32 MG/DL
CREAT/PROT UR: 0.2 RATIO
GLUCOSE QUALITATIVE U: NEGATIVE MG/DL
GLUCOSE SERPL-MCNC: 90 MG/DL
KETONES URINE: NEGATIVE
LEUKOCYTE ESTERASE URINE: NEGATIVE
MICROSCOPIC-UA: NORMAL
NITRITE URINE: NEGATIVE
PH URINE: 6.5
PHOSPHATE SERPL-MCNC: 4 MG/DL
POTASSIUM SERPL-SCNC: 4.5 MMOL/L
PROT UR-MCNC: 6 MG/DL
PROTEIN URINE: NEGATIVE MG/DL
RED BLOOD CELLS URINE: 1 /HPF
SODIUM ?TM SUB UR QN: 35 MMOL/L
SODIUM SERPL-SCNC: 144 MMOL/L
SPECIFIC GRAVITY URINE: 1.01
SQUAMOUS EPITHELIAL CELLS: 0 /HPF
UROBILINOGEN URINE: NEGATIVE MG/DL
WHITE BLOOD CELLS URINE: 0 /HPF

## 2018-07-06 PROCEDURE — G0463: CPT

## 2018-07-06 PROCEDURE — 99214 OFFICE O/P EST MOD 30 MIN: CPT | Mod: GC

## 2018-07-06 NOTE — COUNSELING
[Good understanding] : Patient has a good understanding of lifestyle changes and the steps needed to achieve self management goals [Healthy eating counseling provided] : healthy eating

## 2018-07-11 LAB — RENIN ACTIVITY, PLASMA: 0.75 NG/ML/HR

## 2018-07-18 DIAGNOSIS — Z82.49 FAMILY HISTORY OF ISCHEMIC HEART DISEASE AND OTHER DISEASES OF THE CIRCULATORY SYSTEM: ICD-10-CM

## 2018-07-18 DIAGNOSIS — E78.5 HYPERLIPIDEMIA, UNSPECIFIED: ICD-10-CM

## 2018-07-18 NOTE — HEALTH RISK ASSESSMENT
[0] : 2) Feeling down, depressed, or hopeless: Not at all (0) [Intercurrent ED visits] : went to ED [] : No [[id number]] : 0

## 2018-07-18 NOTE — END OF VISIT
[] : Resident [FreeTextEntry3] : BP improving on additional medication and will be following closely with nephrology for further management

## 2018-07-18 NOTE — PHYSICAL EXAM
[No Acute Distress] : no acute distress [Well Developed] : well developed [Well-Appearing] : well-appearing [Normal Sclera/Conjunctiva] : normal sclera/conjunctiva [EOMI] : extraocular movements intact [Normal Outer Ear/Nose] : the outer ears and nose were normal in appearance [No JVD] : no jugular venous distention [Supple] : supple [Clear to Auscultation] : lungs were clear to auscultation bilaterally [Regular Rhythm] : with a regular rhythm [No Carotid Bruits] : no carotid bruits [No Edema] : there was no peripheral edema [Non Tender] : non-tender [Normal Bowel Sounds] : normal bowel sounds [de-identified] : no JVD

## 2018-07-18 NOTE — HISTORY OF PRESENT ILLNESS
[FreeTextEntry1] : Follow up ED visit for uncontrolled HTN [de-identified] : Pt is a 62 yr-old F with long standing HTN HLD, knee pain, anxiety who presents to clinic for follow up of her BP. Has prior hx of multiple hospitalizations for uncontrolled HTN. Currently, managed with CCB, ARB, hydralazine, labetalol, chlorthalidone, and spironolactone. Pt saw Dr. Joao Shipman, nephrologist, for evaluation of her resistant hypertension. Workup revealed low aldosterone/renin, negative plasma metanephrines and no NICOLLE. In addition, she was found to have high BP and was advised by the nephrologist to go to the ED given that her BP was 210/90. she was given PO Hydralazine. Pt was susbequently discharged after observation. Pt is here today for a follow up after ED visit. Overall, reports feeling better and BP has trended down to 180/90. \par \par Reports has strong family hx of early onset HTN. Her mom, grandparents and two siblings have uncontrolled HTN. She denied palpitations, chest pain, SOB, dyspnea, and pedal edema. Pt is not smoking, drinks socially and denies recreationally drugs. Currently, lives with  and two sons. Pt is compliant with medications, but reports family stressors affecting her ability to adopt a healthy diet.

## 2018-08-16 ENCOUNTER — APPOINTMENT (OUTPATIENT)
Dept: NEPHROLOGY | Facility: CLINIC | Age: 63
End: 2018-08-16
Payer: MEDICARE

## 2018-08-16 VITALS
BODY MASS INDEX: 27.01 KG/M2 | SYSTOLIC BLOOD PRESSURE: 169 MMHG | HEIGHT: 63.5 IN | DIASTOLIC BLOOD PRESSURE: 90 MMHG | OXYGEN SATURATION: 98 % | HEART RATE: 75 BPM | WEIGHT: 154.32 LBS

## 2018-08-16 PROBLEM — J45.909 UNSPECIFIED ASTHMA, UNCOMPLICATED: Chronic | Status: ACTIVE | Noted: 2017-08-16

## 2018-08-16 PROCEDURE — 99214 OFFICE O/P EST MOD 30 MIN: CPT

## 2018-08-17 LAB
ALBUMIN SERPL ELPH-MCNC: 4.2 G/DL
ALDOSTERONE SERUM: 15.3 NG/DL
ANION GAP SERPL CALC-SCNC: 16 MMOL/L
BUN SERPL-MCNC: 11 MG/DL
CALCIUM SERPL-MCNC: 9.8 MG/DL
CHLORIDE SERPL-SCNC: 101 MMOL/L
CO2 SERPL-SCNC: 24 MMOL/L
CREAT SERPL-MCNC: 0.91 MG/DL
GLUCOSE SERPL-MCNC: 69 MG/DL
PHOSPHATE SERPL-MCNC: 3.6 MG/DL
POTASSIUM SERPL-SCNC: 4.3 MMOL/L
SODIUM SERPL-SCNC: 141 MMOL/L

## 2018-08-21 LAB — RENIN ACTIVITY, PLASMA: 0.37 NG/ML/HR

## 2018-09-10 ENCOUNTER — APPOINTMENT (OUTPATIENT)
Dept: INTERNAL MEDICINE | Facility: CLINIC | Age: 63
End: 2018-09-10
Payer: MEDICARE

## 2018-09-10 ENCOUNTER — OUTPATIENT (OUTPATIENT)
Dept: OUTPATIENT SERVICES | Facility: HOSPITAL | Age: 63
LOS: 1 days | End: 2018-09-10
Payer: MEDICARE

## 2018-09-10 VITALS
SYSTOLIC BLOOD PRESSURE: 140 MMHG | HEIGHT: 63.5 IN | OXYGEN SATURATION: 97 % | DIASTOLIC BLOOD PRESSURE: 90 MMHG | BODY MASS INDEX: 27.65 KG/M2 | HEART RATE: 68 BPM | WEIGHT: 158 LBS

## 2018-09-10 DIAGNOSIS — I10 ESSENTIAL (PRIMARY) HYPERTENSION: ICD-10-CM

## 2018-09-10 PROCEDURE — G0463: CPT

## 2018-09-10 PROCEDURE — 99214 OFFICE O/P EST MOD 30 MIN: CPT | Mod: GC

## 2018-09-10 RX ORDER — ASPIRIN 81 MG/1
81 TABLET, DELAYED RELEASE ORAL
Refills: 0 | Status: DISCONTINUED | COMMUNITY
Start: 2017-10-18 | End: 2018-09-10

## 2018-09-10 NOTE — PHYSICAL EXAM
[No Acute Distress] : no acute distress [Well Nourished] : well nourished [Well Developed] : well developed [PERRL] : pupils equal round and reactive to light [Normal Oropharynx] : the oropharynx was normal [No JVD] : no jugular venous distention [Supple] : supple [No Carotid Bruits] : no carotid bruits [Soft] : abdomen soft [Non Tender] : non-tender [No HSM] : no HSM [No Respiratory Distress] : no respiratory distress  [Clear to Auscultation] : lungs were clear to auscultation bilaterally [Normal Rate] : normal rate  [Regular Rhythm] : with a regular rhythm

## 2018-09-23 NOTE — REVIEW OF SYSTEMS
[Fever] : no fever [Chills] : no chills [Chest Pain] : no chest pain [Palpitations] : no palpitations [Shortness Of Breath] : no shortness of breath [Abdominal Pain] : no abdominal pain [Headache] : no headache [Dizziness] : no dizziness

## 2018-09-23 NOTE — HEALTH RISK ASSESSMENT
[No falls in past year] : Patient reported no falls in the past year [0] : 1) Little interest or pleasure doing things: Not at all (0) [1] : 2) Feeling down, depressed, or hopeless for several days (1) [] : No [de-identified] : Dr. Shepherd (nephrologist) [[id number]] : 1

## 2018-09-23 NOTE — PLAN
[FreeTextEntry1] : Patient is a 63 yr-old F with treatment HTN, HLD, b/l arthritis, anxiety who presents to clinic for follow up of her BP.\par \par # Hypertension\par  · - Pt counseled on importance of switching diet to low sodium diet\par     - Continue outpatient f/u with behavioral to address family stressors.\par     - F/u with Dr. Shepherd (nephrologist)\par     - Counseled on medications to take- amlodipine, hydralazine, labetalol, chlorthalidone,\par     and spironolactone\par  \par # Hyperlipidemia\par  · - continue with atorvastatin\par \par case d/w Dr. Archibald\par \par RTC in 2 months after f/u with DR. Shepherd to review BP medications\par \par \par \par

## 2018-09-23 NOTE — HISTORY OF PRESENT ILLNESS
[FreeTextEntry1] : Routine follow up for BP [de-identified] : Patient is a 63 yr-old F with treatment HTN, HLD, b/l arithritis, anxiety who presents to clinic for follow up of her BP. She has had prior hx of multiple hospitalizations for uncontrolled HTN. Currently, managed with CCB, ARB, hydralazine, labetalol, chlorthalidone, and spironolactone. She follows with Dr. Joao Shipman, nephrologist, for evaluation of her resistant hypertension. Workup revealed low aldosterone/renin, negative plasma metanephrines and no NICOLLE. Her nephrologist suspects that her hypertension is secondary to stress. Her most recent BP reading was 160/83. At the clinic, it was 140/90. She reported a strong family hx of early onset HTN. Her mom, grandparents and two siblings have uncontrolled HTN. She denied palpitations, chest pain, SOB, dyspnea, and pedal edema. Pt is not smoking, drinks socially and denies recreationally drugs. Currently, lives with  and two sons. Pt is compliant with medications, but reports family stressors affecting her ability to adopt a healthy diet. \par

## 2018-09-24 DIAGNOSIS — E78.5 HYPERLIPIDEMIA, UNSPECIFIED: ICD-10-CM

## 2018-10-11 ENCOUNTER — RX RENEWAL (OUTPATIENT)
Age: 63
End: 2018-10-11

## 2018-11-20 ENCOUNTER — APPOINTMENT (OUTPATIENT)
Dept: INTERNAL MEDICINE | Facility: CLINIC | Age: 63
End: 2018-11-20

## 2018-11-30 ENCOUNTER — RX RENEWAL (OUTPATIENT)
Age: 63
End: 2018-11-30

## 2018-12-06 ENCOUNTER — LABORATORY RESULT (OUTPATIENT)
Age: 63
End: 2018-12-06

## 2018-12-06 ENCOUNTER — APPOINTMENT (OUTPATIENT)
Dept: NEPHROLOGY | Facility: CLINIC | Age: 63
End: 2018-12-06
Payer: MEDICARE

## 2018-12-06 VITALS — SYSTOLIC BLOOD PRESSURE: 170 MMHG | DIASTOLIC BLOOD PRESSURE: 80 MMHG

## 2018-12-06 VITALS
BODY MASS INDEX: 27.78 KG/M2 | WEIGHT: 158.73 LBS | HEIGHT: 63.5 IN | DIASTOLIC BLOOD PRESSURE: 92 MMHG | OXYGEN SATURATION: 99 % | SYSTOLIC BLOOD PRESSURE: 198 MMHG

## 2018-12-06 PROCEDURE — 99213 OFFICE O/P EST LOW 20 MIN: CPT

## 2018-12-07 LAB
ALBUMIN SERPL ELPH-MCNC: 4.2 G/DL
ALDOSTERONE SERUM: 11.6 NG/DL
ANION GAP SERPL CALC-SCNC: 13 MMOL/L
BUN SERPL-MCNC: 14 MG/DL
CALCIUM SERPL-MCNC: 9.5 MG/DL
CHLORIDE SERPL-SCNC: 100 MMOL/L
CO2 SERPL-SCNC: 28 MMOL/L
CREAT SERPL-MCNC: 0.98 MG/DL
GLUCOSE SERPL-MCNC: 113 MG/DL
PHOSPHATE SERPL-MCNC: 3.6 MG/DL
POTASSIUM SERPL-SCNC: 3.3 MMOL/L
SODIUM SERPL-SCNC: 141 MMOL/L

## 2018-12-13 ENCOUNTER — RX RENEWAL (OUTPATIENT)
Age: 63
End: 2018-12-13

## 2018-12-21 ENCOUNTER — RX CHANGE (OUTPATIENT)
Age: 63
End: 2018-12-21

## 2018-12-21 LAB
METANEPHRINE, PL: <10 PG/ML
NORMETANEPHRINE, PL: 38 PG/ML
RENIN ACTIVITY, PLASMA: 0.44 NG/ML/HR

## 2019-01-11 ENCOUNTER — CHART COPY (OUTPATIENT)
Age: 64
End: 2019-01-11

## 2019-01-17 ENCOUNTER — RX RENEWAL (OUTPATIENT)
Age: 64
End: 2019-01-17

## 2019-01-25 ENCOUNTER — MEDICATION RENEWAL (OUTPATIENT)
Age: 64
End: 2019-01-25

## 2019-02-13 ENCOUNTER — OUTPATIENT (OUTPATIENT)
Dept: OUTPATIENT SERVICES | Facility: HOSPITAL | Age: 64
LOS: 1 days | End: 2019-02-13
Payer: MEDICARE

## 2019-02-13 ENCOUNTER — APPOINTMENT (OUTPATIENT)
Dept: INTERNAL MEDICINE | Facility: CLINIC | Age: 64
End: 2019-02-13
Payer: MEDICARE

## 2019-02-13 VITALS
HEIGHT: 63.5 IN | BODY MASS INDEX: 27.65 KG/M2 | SYSTOLIC BLOOD PRESSURE: 152 MMHG | DIASTOLIC BLOOD PRESSURE: 90 MMHG | WEIGHT: 158 LBS

## 2019-02-13 DIAGNOSIS — I10 ESSENTIAL (PRIMARY) HYPERTENSION: ICD-10-CM

## 2019-02-13 PROCEDURE — G0463: CPT

## 2019-02-13 PROCEDURE — 99213 OFFICE O/P EST LOW 20 MIN: CPT | Mod: GE

## 2019-02-13 NOTE — PHYSICAL EXAM
[No Acute Distress] : no acute distress [PERRL] : pupils equal round and reactive to light [Normal Outer Ear/Nose] : the outer ears and nose were normal in appearance [Supple] : supple [No Respiratory Distress] : no respiratory distress  [Clear to Auscultation] : lungs were clear to auscultation bilaterally [No Accessory Muscle Use] : no accessory muscle use [Normal Rate] : normal rate  [Regular Rhythm] : with a regular rhythm [Normal S1, S2] : normal S1 and S2 [No Murmur] : no murmur heard [No Edema] : there was no peripheral edema [Soft] : abdomen soft [Non-distended] : non-distended [Normal Bowel Sounds] : normal bowel sounds [No Rash] : no rash [Normal Gait] : normal gait

## 2019-02-15 ENCOUNTER — APPOINTMENT (OUTPATIENT)
Dept: MRI IMAGING | Facility: CLINIC | Age: 64
End: 2019-02-15

## 2019-02-18 NOTE — ASSESSMENT
[FreeTextEntry1] : Patient is a 63 yr-old F with treatment HTN, HLD, b/l arthritis, anxiety who presents to clinic for follow up of her BP. \par \par HTN:\par - /90 today\par - c/w current medications\par - f/u Dr. Shepherd nephrology PRN\par - prescribe 1 dose of Valium for MRI related anxiety\par \par Form refilled:\par - form for exercise completed

## 2019-02-18 NOTE — END OF VISIT
[] : Resident [FreeTextEntry3] : Hx as outlined, renal workup noted and followed closely by Dr Shepherd, plan as outlined by DR Shepherd, continue same rx. Reinforce diet

## 2019-02-18 NOTE — HISTORY OF PRESENT ILLNESS
[de-identified] : Patient is a 63 yr-old F with treatment HTN, HLD, b/l arthritis, anxiety who presents to clinic for follow up of her BP. Follows with nephrology Dr. Shepherd - catecholamine mildly elevated, pending adrenal MRI. Suspected large medication non-compliance. Patient reports to be compliant with all medications and no concerns today. \par Also here for form completion. \par Needs MRI for adrenal glands -> would like a Valium pill for anxiety.

## 2019-03-14 ENCOUNTER — APPOINTMENT (OUTPATIENT)
Dept: MRI IMAGING | Facility: CLINIC | Age: 64
End: 2019-03-14

## 2019-04-01 ENCOUNTER — OUTPATIENT (OUTPATIENT)
Dept: OUTPATIENT SERVICES | Facility: HOSPITAL | Age: 64
LOS: 1 days | End: 2019-04-01
Payer: MEDICARE

## 2019-04-01 PROCEDURE — G9001: CPT

## 2019-04-17 ENCOUNTER — EMERGENCY (EMERGENCY)
Facility: HOSPITAL | Age: 64
LOS: 1 days | Discharge: HOME | End: 2019-04-17
Attending: EMERGENCY MEDICINE
Payer: MEDICARE

## 2019-04-17 VITALS
HEART RATE: 65 BPM | OXYGEN SATURATION: 98 % | TEMPERATURE: 98 F | DIASTOLIC BLOOD PRESSURE: 74 MMHG | SYSTOLIC BLOOD PRESSURE: 176 MMHG | RESPIRATION RATE: 16 BRPM

## 2019-04-17 VITALS
WEIGHT: 158.07 LBS | HEART RATE: 64 BPM | RESPIRATION RATE: 16 BRPM | DIASTOLIC BLOOD PRESSURE: 106 MMHG | TEMPERATURE: 98 F | OXYGEN SATURATION: 100 % | HEIGHT: 64 IN | SYSTOLIC BLOOD PRESSURE: 223 MMHG

## 2019-04-17 LAB
ALBUMIN SERPL ELPH-MCNC: 4.2 G/DL — SIGNIFICANT CHANGE UP (ref 3.3–5)
ALP SERPL-CCNC: 137 U/L — HIGH (ref 40–120)
ALT FLD-CCNC: 11 U/L — SIGNIFICANT CHANGE UP (ref 10–45)
ANION GAP SERPL CALC-SCNC: 12 MMOL/L — SIGNIFICANT CHANGE UP (ref 5–17)
APPEARANCE UR: CLEAR — SIGNIFICANT CHANGE UP
AST SERPL-CCNC: 17 U/L — SIGNIFICANT CHANGE UP (ref 10–40)
BACTERIA # UR AUTO: NEGATIVE — SIGNIFICANT CHANGE UP
BASOPHILS # BLD AUTO: 0.1 K/UL — SIGNIFICANT CHANGE UP (ref 0–0.2)
BASOPHILS NFR BLD AUTO: 0.7 % — SIGNIFICANT CHANGE UP (ref 0–2)
BILIRUB SERPL-MCNC: 0.7 MG/DL — SIGNIFICANT CHANGE UP (ref 0.2–1.2)
BILIRUB UR-MCNC: NEGATIVE — SIGNIFICANT CHANGE UP
BUN SERPL-MCNC: 13 MG/DL — SIGNIFICANT CHANGE UP (ref 7–23)
CALCIUM SERPL-MCNC: 9.5 MG/DL — SIGNIFICANT CHANGE UP (ref 8.4–10.5)
CHLORIDE SERPL-SCNC: 102 MMOL/L — SIGNIFICANT CHANGE UP (ref 96–108)
CO2 SERPL-SCNC: 27 MMOL/L — SIGNIFICANT CHANGE UP (ref 22–31)
COLOR SPEC: COLORLESS — SIGNIFICANT CHANGE UP
CREAT SERPL-MCNC: 0.84 MG/DL — SIGNIFICANT CHANGE UP (ref 0.5–1.3)
DIFF PNL FLD: NEGATIVE — SIGNIFICANT CHANGE UP
EOSINOPHIL # BLD AUTO: 0.1 K/UL — SIGNIFICANT CHANGE UP (ref 0–0.5)
EOSINOPHIL NFR BLD AUTO: 1.1 % — SIGNIFICANT CHANGE UP (ref 0–6)
EPI CELLS # UR: 0 /HPF — SIGNIFICANT CHANGE UP
GLUCOSE SERPL-MCNC: 77 MG/DL — SIGNIFICANT CHANGE UP (ref 70–99)
GLUCOSE UR QL: NEGATIVE — SIGNIFICANT CHANGE UP
HCT VFR BLD CALC: 40.9 % — SIGNIFICANT CHANGE UP (ref 34.5–45)
HGB BLD-MCNC: 13.9 G/DL — SIGNIFICANT CHANGE UP (ref 11.5–15.5)
HYALINE CASTS # UR AUTO: 0 /LPF — SIGNIFICANT CHANGE UP (ref 0–2)
KETONES UR-MCNC: NEGATIVE — SIGNIFICANT CHANGE UP
LEUKOCYTE ESTERASE UR-ACNC: NEGATIVE — SIGNIFICANT CHANGE UP
LYMPHOCYTES # BLD AUTO: 2.9 K/UL — SIGNIFICANT CHANGE UP (ref 1–3.3)
LYMPHOCYTES # BLD AUTO: 27.7 % — SIGNIFICANT CHANGE UP (ref 13–44)
MCHC RBC-ENTMCNC: 29.3 PG — SIGNIFICANT CHANGE UP (ref 27–34)
MCHC RBC-ENTMCNC: 34 GM/DL — SIGNIFICANT CHANGE UP (ref 32–36)
MCV RBC AUTO: 86.1 FL — SIGNIFICANT CHANGE UP (ref 80–100)
MONOCYTES # BLD AUTO: 0.6 K/UL — SIGNIFICANT CHANGE UP (ref 0–0.9)
MONOCYTES NFR BLD AUTO: 5.9 % — SIGNIFICANT CHANGE UP (ref 2–14)
NEUTROPHILS # BLD AUTO: 6.7 K/UL — SIGNIFICANT CHANGE UP (ref 1.8–7.4)
NEUTROPHILS NFR BLD AUTO: 64.7 % — SIGNIFICANT CHANGE UP (ref 43–77)
NITRITE UR-MCNC: NEGATIVE — SIGNIFICANT CHANGE UP
PH UR: 7 — SIGNIFICANT CHANGE UP (ref 5–8)
PLATELET # BLD AUTO: 259 K/UL — SIGNIFICANT CHANGE UP (ref 150–400)
POTASSIUM SERPL-MCNC: 3.6 MMOL/L — SIGNIFICANT CHANGE UP (ref 3.5–5.3)
POTASSIUM SERPL-SCNC: 3.6 MMOL/L — SIGNIFICANT CHANGE UP (ref 3.5–5.3)
PROT SERPL-MCNC: 7.7 G/DL — SIGNIFICANT CHANGE UP (ref 6–8.3)
PROT UR-MCNC: NEGATIVE — SIGNIFICANT CHANGE UP
RBC # BLD: 4.75 M/UL — SIGNIFICANT CHANGE UP (ref 3.8–5.2)
RBC # FLD: 12.4 % — SIGNIFICANT CHANGE UP (ref 10.3–14.5)
RBC CASTS # UR COMP ASSIST: 1 /HPF — SIGNIFICANT CHANGE UP (ref 0–4)
SODIUM SERPL-SCNC: 141 MMOL/L — SIGNIFICANT CHANGE UP (ref 135–145)
SP GR SPEC: 1.01 — LOW (ref 1.01–1.02)
TROPONIN T, HIGH SENSITIVITY RESULT: <6 NG/L — SIGNIFICANT CHANGE UP (ref 0–51)
UROBILINOGEN FLD QL: NEGATIVE — SIGNIFICANT CHANGE UP
WBC # BLD: 10.4 K/UL — SIGNIFICANT CHANGE UP (ref 3.8–10.5)
WBC # FLD AUTO: 10.4 K/UL — SIGNIFICANT CHANGE UP (ref 3.8–10.5)
WBC UR QL: 1 /HPF — SIGNIFICANT CHANGE UP (ref 0–5)

## 2019-04-17 PROCEDURE — 70450 CT HEAD/BRAIN W/O DYE: CPT | Mod: 26

## 2019-04-17 PROCEDURE — 96374 THER/PROPH/DIAG INJ IV PUSH: CPT

## 2019-04-17 PROCEDURE — 85027 COMPLETE CBC AUTOMATED: CPT

## 2019-04-17 PROCEDURE — 93005 ELECTROCARDIOGRAM TRACING: CPT

## 2019-04-17 PROCEDURE — 84484 ASSAY OF TROPONIN QUANT: CPT

## 2019-04-17 PROCEDURE — 80053 COMPREHEN METABOLIC PANEL: CPT

## 2019-04-17 PROCEDURE — 73562 X-RAY EXAM OF KNEE 3: CPT | Mod: 26,LT

## 2019-04-17 PROCEDURE — 71046 X-RAY EXAM CHEST 2 VIEWS: CPT

## 2019-04-17 PROCEDURE — 81001 URINALYSIS AUTO W/SCOPE: CPT

## 2019-04-17 PROCEDURE — 99285 EMERGENCY DEPT VISIT HI MDM: CPT | Mod: 25

## 2019-04-17 PROCEDURE — 73562 X-RAY EXAM OF KNEE 3: CPT

## 2019-04-17 PROCEDURE — 71046 X-RAY EXAM CHEST 2 VIEWS: CPT | Mod: 26

## 2019-04-17 PROCEDURE — 70450 CT HEAD/BRAIN W/O DYE: CPT

## 2019-04-17 PROCEDURE — 93010 ELECTROCARDIOGRAM REPORT: CPT

## 2019-04-17 RX ORDER — LABETALOL HCL 100 MG
10 TABLET ORAL ONCE
Qty: 0 | Refills: 0 | Status: COMPLETED | OUTPATIENT
Start: 2019-04-17 | End: 2019-04-17

## 2019-04-17 RX ORDER — HYDRALAZINE HCL 50 MG
25 TABLET ORAL ONCE
Qty: 0 | Refills: 0 | Status: COMPLETED | OUTPATIENT
Start: 2019-04-17 | End: 2019-04-17

## 2019-04-17 RX ORDER — ACETAMINOPHEN 500 MG
975 TABLET ORAL ONCE
Qty: 0 | Refills: 0 | Status: COMPLETED | OUTPATIENT
Start: 2019-04-17 | End: 2019-04-17

## 2019-04-17 RX ORDER — LABETALOL HCL 100 MG
100 TABLET ORAL ONCE
Qty: 0 | Refills: 0 | Status: COMPLETED | OUTPATIENT
Start: 2019-04-17 | End: 2019-04-17

## 2019-04-17 RX ADMIN — Medication 25 MILLIGRAM(S): at 16:33

## 2019-04-17 RX ADMIN — Medication 100 MILLIGRAM(S): at 16:32

## 2019-04-17 RX ADMIN — Medication 975 MILLIGRAM(S): at 12:59

## 2019-04-17 RX ADMIN — Medication 10 MILLIGRAM(S): at 15:38

## 2019-04-17 NOTE — ED ADULT NURSE REASSESSMENT NOTE - NS ED NURSE REASSESS COMMENT FT1
report received from FELICE Vaz. Patient appears to be in no acute distress. vital signs stable. patient to be discharged. safety maintained.

## 2019-04-17 NOTE — ED PROVIDER NOTE - CARE PLAN
Principal Discharge DX:	Hypertension, unspecified type  Assessment and plan of treatment:	Continue your current medication regimen. Be sure to take all of your medications as prescribed including blood pressure medications.   Follow up with your Primary Care Physician and Nephrologist upon discharge.   Keep knee brace in place for comfort and follow up with your orthopedic upon discharge.   Take Tylenol 650mg every 6 hours as needed for pain.   Bring a copy of your test results with you to your appointment.  Return to the Emergency Room if you experience new or worsening symptoms including headache, vision changes, passing out, chest pain, shortness of breath, numbness/tingling, blood in urine, or any other concerning symptoms.  Secondary Diagnosis:	Headache Principal Discharge DX:	Hypertension, unspecified type  Assessment and plan of treatment:	Continue your current medication regimen. Be sure to take all of your medications as prescribed including blood pressure medications.   Follow up with your Primary Care Physician and Nephrologist upon discharge for your high blood pressure.   Keep knee brace in place for comfort and follow up with your orthopedic upon discharge.   Take Tylenol 650mg every 6 hours as needed for pain.   Bring a copy of your test results with you to your appointment.  Return to the Emergency Room if you experience new or worsening symptoms including headache, vision changes, passing out, chest pain, shortness of breath, numbness/tingling, blood in urine, or any other concerning symptoms.  Secondary Diagnosis:	Headache

## 2019-04-17 NOTE — ED PROVIDER NOTE - ATTENDING CONTRIBUTION TO CARE
Patient with head pressure that is typical for her, she states he can feel it when her blood pressure is elevated. Mild. Patient presents with chief complaint of left hand tingling, lightheadedness. + left knee pain from twisting injury as well with mild pain. No chest pain shortness of breath, weakness.   GEN: NAD, awake, eyes open spontaneously  HEENT: NCAT, MMM, Trachea midline, normal conjunctiva, perrl  CHEST/LUNGS: Non-tachypneic, CTAB, bilateral breath sounds  CARDIAC: Non-tachycardic, normal perfusion  ABDOMEN: Soft, NTND, No rebound/guarding  MSK: No edema, no gross deformity of extremities  SKIN: No rashes, no petechiae, no vesicles  NEURO: CN grossly intact, normal coordination, no focal motor or sensory deficits, 5/5 str to bilateral UE and LE, 5/5 sensory to bilateral UE and LE  PSYCH: Alert, appropriate, cooperative, with capacity and insight   concern for elevated blood pressure will check compliance and home blood pressure medications  will get iv, cbc, cmp, cxr, ct head, ce screen for end organ damage from Hypertension and treat blood pressure prn.  Will follow up on labs, analgesia, imaging, reassess and disposition as clinically indicated.

## 2019-04-17 NOTE — ED PROVIDER NOTE - NSFOLLOWUPINSTRUCTIONS_ED_ALL_ED_FT
Continue your current medication regimen. Be sure to take all of your medications as prescribed including blood pressure medications.   Follow up with your Primary Care Physician and Nephrologist upon discharge for your high blood pressure.   Keep knee brace in place for comfort and follow up with your orthopedic upon discharge.   Take Tylenol 650mg every 6 hours as needed for pain.   Bring a copy of your test results with you to your appointment.  Return to the Emergency Room if you experience new or worsening symptoms including headache, vision changes, passing out, chest pain, shortness of breath, numbness/tingling, blood in urine, or any other concerning symptoms.

## 2019-04-17 NOTE — ED PROVIDER NOTE - PROGRESS NOTE DETAILS
Called patient's pharmacy, last BP meds filled in recent months include Labetalol 100mg BID, hydralazine 25mg Q8, and spironolactone 100mg once daily. Picked up naproxen 500mg tabs on 4/6/19. - Estelle Adams PA-C Called patient's pharmacy, last BP meds filled in recent months include Labetalol 100mg BID, hydralazine 25mg Q8, and spironolactone 100mg once daily. Picked up naproxen 500mg tabs on 4/6/19. Per EMR review, also on clonidine 0.1 q24. - Estelle Adams PA-C Patient feeling well. All results discussed. Patient understands importance of follow up to manage her HTN. - Estelle Adams PA-C

## 2019-04-17 NOTE — ED PROVIDER NOTE - SHIFT CHANGE DETAILS
Alessandro Carlson MD [facility] note of transfer at the usual time of sign out: Receiving team will follow up on labs, analgesia, any clinical imaging, reassess and disposition as clinically indicated.  Details of patient and plan conveyed to receiving physician and conveyed back for understanding.  There were no questions at this time about the patient's status, disposition, and plan. Patient's care to be taken over by receiving physician at this time, all decisions regarding the progression of care will be made at their discretion.

## 2019-04-17 NOTE — ED PROVIDER NOTE - PLAN OF CARE
Continue your current medication regimen. Be sure to take all of your medications as prescribed including blood pressure medications.   Follow up with your Primary Care Physician and Nephrologist upon discharge.   Keep knee brace in place for comfort and follow up with your orthopedic upon discharge.   Take Tylenol 650mg every 6 hours as needed for pain.   Bring a copy of your test results with you to your appointment.  Return to the Emergency Room if you experience new or worsening symptoms including headache, vision changes, passing out, chest pain, shortness of breath, numbness/tingling, blood in urine, or any other concerning symptoms. Continue your current medication regimen. Be sure to take all of your medications as prescribed including blood pressure medications.   Follow up with your Primary Care Physician and Nephrologist upon discharge for your high blood pressure.   Keep knee brace in place for comfort and follow up with your orthopedic upon discharge.   Take Tylenol 650mg every 6 hours as needed for pain.   Bring a copy of your test results with you to your appointment.  Return to the Emergency Room if you experience new or worsening symptoms including headache, vision changes, passing out, chest pain, shortness of breath, numbness/tingling, blood in urine, or any other concerning symptoms.

## 2019-04-17 NOTE — ED ADULT NURSE NOTE - OBJECTIVE STATEMENT
63YOF pmh HTN, HLD, osteparthritis, depression presents to ED c/o elevated BP/ lightheadedness, nausea, L hand tingling. Pt states she always has this feeling when her BP is high. This morning she was at clinic for her depression and sent to ED for elevated BP. Pt endorses left knee pain associated with her knee being twisted on april 3rd. Pt has gotten xray of knee in the past, negative per pt. Denies vision changes, HA, weakness, CP, SOB, numbness, back pain, abd pain, burning upon urination. Safety and comfort measures provided. Will continue to monitor.

## 2019-04-17 NOTE — ED PROVIDER NOTE - OBJECTIVE STATEMENT
62yo F with PMH HTN, HLD, osteoarthritis of b/l knees, depression presenting with elevated blood pressure with associated lightheadedness, head pressure, and intermittent L hand tingling. Pt reports she "always" feels this way, especially when her blood pressure is elevated. Reports she is compliant with her meds, also f/u with nephro for HTN, but does not recall her meds/doses at this time. Pt reports she f/u with her psych NP today and was directed to ED 2/2 her very high BP. Pt reports she thinks her BP is high due to L knee pain. Pt twisted her L knee on 4/3/19, went to , was given NSAIDS and ortho f/u. Pt reports she f/u with ortho, was placed in a sturdier brace and told to continue pain meds and rest. Did not take anything today for pain. 64yo F with PMH HTN, HLD, osteoarthritis of b/l knees, depression presenting with elevated blood pressure with associated lightheadedness, head pressure, and intermittent L hand tingling. Pt reports she "always" feels this way, especially when her blood pressure is elevated. Reports she is compliant with her meds, also f/u with nephro for HTN, but does not recall her meds/doses at this time. Pt reports she f/u with her psych NP today and was directed to ED 2/2 her very high BP. Pt reports she thinks her BP is high due to L knee pain. Pt twisted her L knee on 4/3/19, went to , was given NSAIDS and ortho f/u. Pt reports she f/u with ortho, was placed in a sturdier brace and told to continue pain meds and rest. Did not take anything today for pain. Denies any vision/hearing changes, CP, SOB, abd pain, n/v/d, dysuria, hematuria, any other numbness/tingling, weakness, unsteady gait. Reports last stress test 1 year ago and WNL.   PCP PHILIPPE Shepherd 64yo F with PMH HTN, HLD, osteoarthritis of b/l knees, depression presenting with elevated blood pressure with associated lightheadedness, nausea, head pressure, and intermittent L hand tingling. Pt reports she "always" feels this way, especially when her blood pressure is elevated. Head pressure is moderate in severity and diffuse. Reports she is compliant with her meds, also f/u with nephro for HTN, but does not recall her meds/doses at this time. Pt reports she f/u with her psych NP today and was directed to ED 2/2 her very high BP. Pt reports she thinks her BP is high due to L knee pain. Pt twisted her L knee on 4/3/19, went to , was given NSAIDS and ortho f/u. Pt reports she f/u with ortho, was placed in a sturdier brace and told to continue pain meds and rest. Did not take anything today for pain. Denies any vision/hearing changes, CP, SOB, abd pain, v/d, dysuria, hematuria, any other numbness/tingling, weakness, unsteady gait. Reports last stress test 1 year ago and WNL.   PCP PHILIPPE Shepherd

## 2019-04-18 DIAGNOSIS — Z71.89 OTHER SPECIFIED COUNSELING: ICD-10-CM

## 2019-04-24 ENCOUNTER — OUTPATIENT (OUTPATIENT)
Dept: OUTPATIENT SERVICES | Facility: HOSPITAL | Age: 64
LOS: 1 days | End: 2019-04-24
Payer: MEDICARE

## 2019-04-24 ENCOUNTER — APPOINTMENT (OUTPATIENT)
Dept: INTERNAL MEDICINE | Facility: CLINIC | Age: 64
End: 2019-04-24
Payer: MEDICARE

## 2019-04-24 VITALS — SYSTOLIC BLOOD PRESSURE: 162 MMHG | DIASTOLIC BLOOD PRESSURE: 84 MMHG

## 2019-04-24 VITALS
DIASTOLIC BLOOD PRESSURE: 100 MMHG | HEART RATE: 67 BPM | SYSTOLIC BLOOD PRESSURE: 179 MMHG | HEIGHT: 63.5 IN | WEIGHT: 156 LBS | BODY MASS INDEX: 27.3 KG/M2 | OXYGEN SATURATION: 97 %

## 2019-04-24 DIAGNOSIS — Z78.9 OTHER SPECIFIED HEALTH STATUS: ICD-10-CM

## 2019-04-24 DIAGNOSIS — Z56.0 UNEMPLOYMENT, UNSPECIFIED: ICD-10-CM

## 2019-04-24 DIAGNOSIS — I10 ESSENTIAL (PRIMARY) HYPERTENSION: ICD-10-CM

## 2019-04-24 DIAGNOSIS — Z87.898 PERSONAL HISTORY OF OTHER SPECIFIED CONDITIONS: ICD-10-CM

## 2019-04-24 PROCEDURE — 99213 OFFICE O/P EST LOW 20 MIN: CPT | Mod: GE

## 2019-04-24 PROCEDURE — G0463: CPT

## 2019-04-24 RX ORDER — DIAZEPAM 5 MG/1
5 TABLET ORAL
Qty: 1 | Refills: 0 | Status: COMPLETED | COMMUNITY
Start: 2019-02-13 | End: 2019-04-24

## 2019-04-24 RX ORDER — CLONIDINE 0.1 MG/24H
0.1 PATCH, EXTENDED RELEASE TRANSDERMAL
Qty: 4 | Refills: 3 | Status: COMPLETED | COMMUNITY
Start: 2018-12-21 | End: 2019-04-24

## 2019-04-24 RX ORDER — CYCLOBENZAPRINE HYDROCHLORIDE 10 MG/1
10 TABLET, FILM COATED ORAL
Qty: 14 | Refills: 0 | Status: COMPLETED | COMMUNITY
End: 2019-04-24

## 2019-04-24 SDOH — ECONOMIC STABILITY - INCOME SECURITY: UNEMPLOYMENT, UNSPECIFIED: Z56.0

## 2019-04-25 ENCOUNTER — APPOINTMENT (OUTPATIENT)
Dept: ORTHOPEDIC SURGERY | Facility: CLINIC | Age: 64
End: 2019-04-25
Payer: MEDICARE

## 2019-04-25 PROCEDURE — 20610 DRAIN/INJ JOINT/BURSA W/O US: CPT | Mod: LT

## 2019-04-25 PROCEDURE — 99214 OFFICE O/P EST MOD 30 MIN: CPT | Mod: 25

## 2019-04-25 PROCEDURE — 72100 X-RAY EXAM L-S SPINE 2/3 VWS: CPT

## 2019-04-26 PROBLEM — Z78.9 RARELY CONSUMES ALCOHOL: Noted: 2017-11-15

## 2019-04-26 PROBLEM — Z78.9 EXERCISES RARELY: Noted: 2017-11-15

## 2019-04-26 PROBLEM — Z78.9 EXERCISES RARELY: Status: ACTIVE | Noted: 2017-11-15

## 2019-04-26 PROBLEM — Z78.9 DOES NOT USE ILLICIT DRUGS: Status: ACTIVE | Noted: 2017-11-22

## 2019-04-26 PROBLEM — Z78.9 CURRENT NON-SMOKER: Noted: 2017-11-15

## 2019-04-26 PROBLEM — Z78.9 DOES NOT USE ILLICIT DRUGS: Noted: 2017-11-15

## 2019-04-26 PROBLEM — Z56.0 UNEMPLOYED: Noted: 2017-11-15

## 2019-04-26 NOTE — PROCEDURE
[de-identified] : Aspiration & Injection: Left knee joint.\par Indication: Osteoarthritis.\par \par A discussion was had with the patient regarding this procedure and all questions were answered. All risks, benefits and alternatives were discussed. These included but were not limited to bleeding, infection, allergic reaction and reaccumulation of fluid. Alcohol was used to clean the skin, and betadine was used to sterilize and prep the area in the supero-lateral aspect of the left knee. Ethyl chloride spray was then used as a topical anesthetic. An 18-gauge needle was used to aspirate the knee joint and approximately 23 cc of inflammatory fluid was aspirated from the left knee without complication. In addition, following the aspiration, an injection of 4cc of 1% lidocaine and 1cc of 40mg/ml methylprednisolone also inserted into the knee via the same needle. A sterile bandage was then applied. The patient tolerated the procedure well.

## 2019-04-26 NOTE — PHYSICAL EXAM
[No Acute Distress] : no acute distress [Well Nourished] : well nourished [Well Developed] : well developed [Well-Appearing] : well-appearing [Normal Sclera/Conjunctiva] : normal sclera/conjunctiva [PERRL] : pupils equal round and reactive to light [Normal Outer Ear/Nose] : the outer ears and nose were normal in appearance [EOMI] : extraocular movements intact [No JVD] : no jugular venous distention [Normal Oropharynx] : the oropharynx was normal [Supple] : supple [Thyroid Normal, No Nodules] : the thyroid was normal and there were no nodules present [No Respiratory Distress] : no respiratory distress  [No Lymphadenopathy] : no lymphadenopathy [No Accessory Muscle Use] : no accessory muscle use [Clear to Auscultation] : lungs were clear to auscultation bilaterally [Regular Rhythm] : with a regular rhythm [Normal Rate] : normal rate  [Normal S1, S2] : normal S1 and S2 [No Murmur] : no murmur heard [Soft] : abdomen soft [Non Tender] : non-tender [Non-distended] : non-distended [No HSM] : no HSM [No Masses] : no abdominal mass palpated [Normal Bowel Sounds] : normal bowel sounds [No CVA Tenderness] : no CVA  tenderness [Grossly Normal Strength/Tone] : grossly normal strength/tone [No Spinal Tenderness] : no spinal tenderness [Normal Gait] : normal gait [No Rash] : no rash [Coordination Grossly Intact] : coordination grossly intact [No Focal Deficits] : no focal deficits [Deep Tendon Reflexes (DTR)] : deep tendon reflexes were 2+ and symmetric [de-identified] : Unable to obtain sufficient fundoscopic exam [de-identified] : N [de-identified] : Mild left knee TTP without restricted movement. Negative ant/posterior drawer sign, negative valgus/varus stress

## 2019-04-26 NOTE — HISTORY OF PRESENT ILLNESS
[de-identified] : 63 year old female with b/l knee PF OA presents with left knee pain that began on 4/3/19. She states that she was doing an exercise class that involved a deep lunge when she felt an extreme pain on her left knee. She went to [facility] for this issue where she had an xray taken of her knee and later was taken to [facility] for HTN and they also x-rayed her knee. She states that the pain radiates from her knee to her back and her lower leg as well. She has been taking Tylenol for this pain and is supposed to be going to aquatic therapy. Denies N/T into lower extremity. Previously seen in 2018 for right knee pain.

## 2019-04-26 NOTE — PHYSICAL EXAM
[de-identified] : Oriented to time, place, person\par Mood: Normal\par Affect: Normal\par Appearance: Healthy, well appearing, no acute distress.\par Gait: Antalgic\par Assistive Devices: None\par \par Left knee exam\par \par Skin: Clean, dry, intact\par Inspection: No obvious malalignment, no masses, minimal swelling, moderate effusion\par Pulses: 2+ DP/PT pulses\par ROM: 5-120 degrees of flexion. Moderate pain with deep knee flexion/extension.\par Tenderness: Positive. No LJLT. Positive pain over the patella facets. No pain to the quadriceps tendon. No pain to the patella tendon. No posterior knee tenderness.\par Stability: Stable to varus, valgus. Negative lachman testing. Negative anterior drawer, negative posterior drawer.\par Strength: 5/5 Q/H/TA/GS/EHL, without atrophy\par Neuro: In tact to light touch throughout, DTR's normal\par Additional tests: Positive McMurrays test, mild patellar grind test. \par \par  [de-identified] : \par The following radiographs were ordered and read by me during this patients visit. I reviewed each radiograph in detail with the patient and discussed the findings as highlighted below. \par \par 2 views of the lumbar spine were obtained today that show no acute fracture or dislocation. There is mild degenerative disc disease seen. There is no gross malalignment.

## 2019-04-26 NOTE — ASSESSMENT
[FreeTextEntry1] : Mrs. Springer is a 63 yr-old F with resistant HTN, HLD, b/l arithritis, depression/anxiety presenting for hospital follow up.

## 2019-04-26 NOTE — PLAN
[FreeTextEntry1] : Resistant HTN: negative w/u for secondary htn w/ normal MRI, continue htn despite aggerssive BP regimen. Not checking BP at home as instructed. Recent labs from 4/17 wnl. \par -will discuss with Dr. Shepherd about adjustment in medication regimen\par -continue on current medication regimen, hydralazine, labetolol spironolactone, clonidione, chlorthalidone, amlodipine-valsartan\par -no sign of htn emergency. \par \par Left knee pain: no signs of meniscal tear, patient to f/u with ortho tomorrow\par -continue acetaminophen PRN, avoid NSAIDs

## 2019-04-26 NOTE — HISTORY OF PRESENT ILLNESS
[FreeTextEntry1] : Mrs. Springer is a 63 yr-old F with resistant HTN, HLD, b/l arithritis, depression/anxiety. [de-identified] : Patient w/u for HTN w/ borderline catecholamines had MRI performed 3/2019 wnl. Was started on clonidine patch and increased dose 4/18 due to elevated BP to 166/97. Follows with Dr Shepherd. Recommended to be on 2g NaCl diet. TTE 12/2017 wnl. \par She states that she was in the ED 4/17 for HTN to 223/106 and was given PO hydralazine 25, labetolol 100 and 10mg IV. She was having nausea and dizziness during this time and had CT head wnl. \par Patient states she was having left leg pain after yoga and believes she strained her left knee which may have led to the elevate BP. She states she has been following her diet try to stay < 2g NaCl a day. She states that she has been taking her medications regularly. \par She states that she is currently feeling well without symptoms.

## 2019-05-30 ENCOUNTER — RX RENEWAL (OUTPATIENT)
Age: 64
End: 2019-05-30

## 2019-06-27 ENCOUNTER — RX RENEWAL (OUTPATIENT)
Age: 64
End: 2019-06-27

## 2019-07-12 ENCOUNTER — INPATIENT (INPATIENT)
Facility: HOSPITAL | Age: 64
LOS: 4 days | Discharge: HOME | DRG: 305 | End: 2019-07-17
Attending: HOSPITALIST | Admitting: HOSPITALIST
Payer: MEDICARE

## 2019-07-12 ENCOUNTER — APPOINTMENT (OUTPATIENT)
Dept: NEPHROLOGY | Facility: CLINIC | Age: 64
End: 2019-07-12
Payer: MEDICARE

## 2019-07-12 VITALS
HEIGHT: 63.5 IN | HEART RATE: 65 BPM | WEIGHT: 157.41 LBS | BODY MASS INDEX: 27.55 KG/M2 | DIASTOLIC BLOOD PRESSURE: 106 MMHG | OXYGEN SATURATION: 99 % | SYSTOLIC BLOOD PRESSURE: 190 MMHG

## 2019-07-12 VITALS
WEIGHT: 156.09 LBS | HEIGHT: 64 IN | OXYGEN SATURATION: 99 % | SYSTOLIC BLOOD PRESSURE: 206 MMHG | DIASTOLIC BLOOD PRESSURE: 85 MMHG | TEMPERATURE: 98 F | RESPIRATION RATE: 17 BRPM | HEART RATE: 69 BPM

## 2019-07-12 VITALS — DIASTOLIC BLOOD PRESSURE: 90 MMHG | SYSTOLIC BLOOD PRESSURE: 210 MMHG

## 2019-07-12 VITALS — DIASTOLIC BLOOD PRESSURE: 98 MMHG | SYSTOLIC BLOOD PRESSURE: 198 MMHG

## 2019-07-12 DIAGNOSIS — F32.9 MAJOR DEPRESSIVE DISORDER, SINGLE EPISODE, UNSPECIFIED: ICD-10-CM

## 2019-07-12 DIAGNOSIS — I10 ESSENTIAL (PRIMARY) HYPERTENSION: ICD-10-CM

## 2019-07-12 DIAGNOSIS — I16.0 HYPERTENSIVE URGENCY: ICD-10-CM

## 2019-07-12 DIAGNOSIS — M19.90 UNSPECIFIED OSTEOARTHRITIS, UNSPECIFIED SITE: ICD-10-CM

## 2019-07-12 DIAGNOSIS — Z29.9 ENCOUNTER FOR PROPHYLACTIC MEASURES, UNSPECIFIED: ICD-10-CM

## 2019-07-12 DIAGNOSIS — E78.00 PURE HYPERCHOLESTEROLEMIA, UNSPECIFIED: ICD-10-CM

## 2019-07-12 LAB
ALBUMIN SERPL ELPH-MCNC: 4.1 G/DL — SIGNIFICANT CHANGE UP (ref 3.3–5)
ALP SERPL-CCNC: 125 U/L — HIGH (ref 40–120)
ALT FLD-CCNC: 12 U/L — SIGNIFICANT CHANGE UP (ref 10–45)
ANION GAP SERPL CALC-SCNC: 12 MMOL/L — SIGNIFICANT CHANGE UP (ref 5–17)
APPEARANCE UR: CLEAR — SIGNIFICANT CHANGE UP
AST SERPL-CCNC: 14 U/L — SIGNIFICANT CHANGE UP (ref 10–40)
BACTERIA # UR AUTO: NEGATIVE — SIGNIFICANT CHANGE UP
BASOPHILS # BLD AUTO: 0 K/UL — SIGNIFICANT CHANGE UP (ref 0–0.2)
BASOPHILS NFR BLD AUTO: 0.1 % — SIGNIFICANT CHANGE UP (ref 0–2)
BILIRUB SERPL-MCNC: 0.6 MG/DL — SIGNIFICANT CHANGE UP (ref 0.2–1.2)
BILIRUB UR-MCNC: NEGATIVE — SIGNIFICANT CHANGE UP
BUN SERPL-MCNC: 13 MG/DL — SIGNIFICANT CHANGE UP (ref 7–23)
CALCIUM SERPL-MCNC: 9.2 MG/DL — SIGNIFICANT CHANGE UP (ref 8.4–10.5)
CHLORIDE SERPL-SCNC: 102 MMOL/L — SIGNIFICANT CHANGE UP (ref 96–108)
CO2 SERPL-SCNC: 29 MMOL/L — SIGNIFICANT CHANGE UP (ref 22–31)
COLOR SPEC: COLORLESS — SIGNIFICANT CHANGE UP
CREAT SERPL-MCNC: 0.94 MG/DL — SIGNIFICANT CHANGE UP (ref 0.5–1.3)
DIFF PNL FLD: NEGATIVE — SIGNIFICANT CHANGE UP
EOSINOPHIL # BLD AUTO: 0.1 K/UL — SIGNIFICANT CHANGE UP (ref 0–0.5)
EOSINOPHIL NFR BLD AUTO: 1.2 % — SIGNIFICANT CHANGE UP (ref 0–6)
EPI CELLS # UR: 1 /HPF — SIGNIFICANT CHANGE UP
GLUCOSE SERPL-MCNC: 86 MG/DL — SIGNIFICANT CHANGE UP (ref 70–99)
GLUCOSE UR QL: NEGATIVE — SIGNIFICANT CHANGE UP
HCT VFR BLD CALC: 40.4 % — SIGNIFICANT CHANGE UP (ref 34.5–45)
HGB BLD-MCNC: 14 G/DL — SIGNIFICANT CHANGE UP (ref 11.5–15.5)
HYALINE CASTS # UR AUTO: 1 /LPF — SIGNIFICANT CHANGE UP (ref 0–2)
KETONES UR-MCNC: NEGATIVE — SIGNIFICANT CHANGE UP
LEUKOCYTE ESTERASE UR-ACNC: NEGATIVE — SIGNIFICANT CHANGE UP
LYMPHOCYTES # BLD AUTO: 2.5 K/UL — SIGNIFICANT CHANGE UP (ref 1–3.3)
LYMPHOCYTES # BLD AUTO: 28.3 % — SIGNIFICANT CHANGE UP (ref 13–44)
MCHC RBC-ENTMCNC: 29.3 PG — SIGNIFICANT CHANGE UP (ref 27–34)
MCHC RBC-ENTMCNC: 34.5 GM/DL — SIGNIFICANT CHANGE UP (ref 32–36)
MCV RBC AUTO: 85 FL — SIGNIFICANT CHANGE UP (ref 80–100)
MONOCYTES # BLD AUTO: 0.5 K/UL — SIGNIFICANT CHANGE UP (ref 0–0.9)
MONOCYTES NFR BLD AUTO: 5.7 % — SIGNIFICANT CHANGE UP (ref 2–14)
NEUTROPHILS # BLD AUTO: 5.7 K/UL — SIGNIFICANT CHANGE UP (ref 1.8–7.4)
NEUTROPHILS NFR BLD AUTO: 64.8 % — SIGNIFICANT CHANGE UP (ref 43–77)
NITRITE UR-MCNC: NEGATIVE — SIGNIFICANT CHANGE UP
PH UR: 6.5 — SIGNIFICANT CHANGE UP (ref 5–8)
PLATELET # BLD AUTO: 254 K/UL — SIGNIFICANT CHANGE UP (ref 150–400)
POTASSIUM SERPL-MCNC: 3.9 MMOL/L — SIGNIFICANT CHANGE UP (ref 3.5–5.3)
POTASSIUM SERPL-SCNC: 3.9 MMOL/L — SIGNIFICANT CHANGE UP (ref 3.5–5.3)
PROT SERPL-MCNC: 7.5 G/DL — SIGNIFICANT CHANGE UP (ref 6–8.3)
PROT UR-MCNC: NEGATIVE — SIGNIFICANT CHANGE UP
RBC # BLD: 4.76 M/UL — SIGNIFICANT CHANGE UP (ref 3.8–5.2)
RBC # FLD: 11.5 % — SIGNIFICANT CHANGE UP (ref 10.3–14.5)
RBC CASTS # UR COMP ASSIST: 4 /HPF — SIGNIFICANT CHANGE UP (ref 0–4)
SODIUM SERPL-SCNC: 143 MMOL/L — SIGNIFICANT CHANGE UP (ref 135–145)
SP GR SPEC: 1.01 — LOW (ref 1.01–1.02)
UROBILINOGEN FLD QL: NEGATIVE — SIGNIFICANT CHANGE UP
WBC # BLD: 8.8 K/UL — SIGNIFICANT CHANGE UP (ref 3.8–10.5)
WBC # FLD AUTO: 8.8 K/UL — SIGNIFICANT CHANGE UP (ref 3.8–10.5)
WBC UR QL: 1 /HPF — SIGNIFICANT CHANGE UP (ref 0–5)

## 2019-07-12 PROCEDURE — 99214 OFFICE O/P EST MOD 30 MIN: CPT

## 2019-07-12 PROCEDURE — 93010 ELECTROCARDIOGRAM REPORT: CPT

## 2019-07-12 PROCEDURE — 99285 EMERGENCY DEPT VISIT HI MDM: CPT

## 2019-07-12 PROCEDURE — 99223 1ST HOSP IP/OBS HIGH 75: CPT | Mod: GC

## 2019-07-12 RX ORDER — AMLODIPINE BESYLATE 2.5 MG/1
5 TABLET ORAL ONCE
Refills: 0 | Status: DISCONTINUED | OUTPATIENT
Start: 2019-07-12 | End: 2019-07-12

## 2019-07-12 RX ORDER — ESCITALOPRAM OXALATE 10 MG/1
10 TABLET, FILM COATED ORAL DAILY
Refills: 0 | Status: DISCONTINUED | OUTPATIENT
Start: 2019-07-12 | End: 2019-07-17

## 2019-07-12 RX ORDER — FLUTICASONE PROPIONATE 220 MCG
1 AEROSOL WITH ADAPTER (GRAM) INHALATION
Qty: 0 | Refills: 0 | DISCHARGE

## 2019-07-12 RX ORDER — ATORVASTATIN CALCIUM 80 MG/1
40 TABLET, FILM COATED ORAL AT BEDTIME
Refills: 0 | Status: DISCONTINUED | OUTPATIENT
Start: 2019-07-12 | End: 2019-07-17

## 2019-07-12 RX ORDER — CAPTOPRIL 12.5 MG/1
12.5 TABLET ORAL THREE TIMES A DAY
Refills: 0 | Status: DISCONTINUED | OUTPATIENT
Start: 2019-07-12 | End: 2019-07-13

## 2019-07-12 RX ORDER — TRAZODONE HCL 50 MG
150 TABLET ORAL AT BEDTIME
Refills: 0 | Status: DISCONTINUED | OUTPATIENT
Start: 2019-07-12 | End: 2019-07-12

## 2019-07-12 RX ORDER — OMEPRAZOLE 10 MG/1
1 CAPSULE, DELAYED RELEASE ORAL
Qty: 0 | Refills: 0 | DISCHARGE

## 2019-07-12 RX ORDER — AMLODIPINE AND VALSARTAN 10; 320 MG/1; MG/1
10-320 TABLET, FILM COATED ORAL DAILY
Qty: 30 | Refills: 5 | Status: DISCONTINUED | COMMUNITY
Start: 2017-10-03 | End: 2019-07-12

## 2019-07-12 RX ORDER — METOPROLOL TARTRATE 50 MG
1 TABLET ORAL
Qty: 0 | Refills: 0 | DISCHARGE

## 2019-07-12 RX ORDER — TRAZODONE HCL 50 MG
1 TABLET ORAL
Qty: 0 | Refills: 0 | DISCHARGE

## 2019-07-12 RX ORDER — LABETALOL HCL 100 MG
10 TABLET ORAL ONCE
Refills: 0 | Status: COMPLETED | OUTPATIENT
Start: 2019-07-12 | End: 2019-07-12

## 2019-07-12 RX ORDER — ACETAMINOPHEN 500 MG
650 TABLET ORAL EVERY 6 HOURS
Refills: 0 | Status: DISCONTINUED | OUTPATIENT
Start: 2019-07-12 | End: 2019-07-17

## 2019-07-12 RX ORDER — AMLODIPINE BESYLATE 2.5 MG/1
0 TABLET ORAL
Qty: 0 | Refills: 0 | DISCHARGE

## 2019-07-12 RX ORDER — OMEGA-3 ACID ETHYL ESTERS 1 G
0 CAPSULE ORAL
Qty: 0 | Refills: 0 | DISCHARGE

## 2019-07-12 RX ORDER — TRAZODONE HCL 50 MG
0.5 TABLET ORAL
Qty: 0 | Refills: 0 | DISCHARGE

## 2019-07-12 RX ORDER — TRAZODONE HCL 50 MG
300 TABLET ORAL AT BEDTIME
Refills: 0 | Status: DISCONTINUED | OUTPATIENT
Start: 2019-07-12 | End: 2019-07-17

## 2019-07-12 RX ORDER — AMLODIPINE BESYLATE 2.5 MG/1
5 TABLET ORAL DAILY
Refills: 0 | Status: DISCONTINUED | OUTPATIENT
Start: 2019-07-12 | End: 2019-07-13

## 2019-07-12 RX ORDER — CHLORTHALIDONE 50 MG
1 TABLET ORAL
Qty: 0 | Refills: 0 | DISCHARGE

## 2019-07-12 RX ADMIN — Medication 300 MILLIGRAM(S): at 21:13

## 2019-07-12 RX ADMIN — CAPTOPRIL 12.5 MILLIGRAM(S): 12.5 TABLET ORAL at 21:13

## 2019-07-12 RX ADMIN — Medication 10 MILLIGRAM(S): at 13:52

## 2019-07-12 RX ADMIN — ATORVASTATIN CALCIUM 40 MILLIGRAM(S): 80 TABLET, FILM COATED ORAL at 21:13

## 2019-07-12 NOTE — PHYSICAL EXAM
[General Appearance - Alert] : alert [General Appearance - Well Nourished] : well nourished [General Appearance - In No Acute Distress] : in no acute distress [General Appearance - Well Developed] : well developed [Sclera] : the sclera and conjunctiva were normal [Examination Of The Oral Cavity] : the lips and gums were normal [Hearing Threshold Finger Rub Not [place]] : hearing was normal [Oropharynx] : the oropharynx was normal [Nasal Cavity] : the nasal mucosa and septum were normal [Neck Appearance] : the appearance of the neck was normal [Neck Cervical Mass (___cm)] : no neck mass was observed [Jugular Venous Distention Increased] : there was no jugular-venous distention [Respiration, Rhythm And Depth] : normal respiratory rhythm and effort [Auscultation Breath Sounds / Voice Sounds] : lungs were clear to auscultation bilaterally [Exaggerated Use Of Accessory Muscles For Inspiration] : no accessory muscle use [Heart Sounds] : normal S1 and S2 [Murmurs] : no murmurs [Heart Sounds Gallop] : no gallops [Heart Sounds Pericardial Friction Rub] : no pericardial rub [Edema] : there was no peripheral edema [Bowel Sounds] : normal bowel sounds [] : no hepato-splenomegaly [Abdomen Tenderness] : non-tender [Abdomen Soft] : soft [Cervical Lymph Nodes Enlarged Posterior Bilaterally] : posterior cervical [Cervical Lymph Nodes Enlarged Anterior Bilaterally] : anterior cervical [No CVA Tenderness] : no ~M costovertebral angle tenderness [Abnormal Walk] : normal gait [No Spinal Tenderness] : no spinal tenderness [Oriented To Time, Place, And Person] : oriented to person, place, and time [Affect] : the affect was normal [Impaired Insight] : insight and judgment were intact [Mood] : the mood was normal

## 2019-07-12 NOTE — ED PROVIDER NOTE - OBJECTIVE STATEMENT
63 y/o female with PMH HTN, HLD presents to ED sent in by Nephrologist (dr. christopher) for elevated /"something." Patient states for the last 3 days she has had 2/10 left sided headaches, with nausea, without vomiting. States that he blood pressure has been uncontrolled multiple times with systolic pressures avg in the 180s. States that her doctors are trying to get her blood pressure under control for while.   +Chronic back pain +Chronic L knee pain both unchanged with mild relief with Tylenol   Patient has been taking 4 BP medications and states that she has not been taking a 5th one for "months," which was just refilled recently, unsure of name at this time.   Patient was in [facility] ED in April 2019 for similar symptoms, was DC with outpatient follow up  Last dose of Tylenol was this morning, with relief.   Denies chest pain, SOB, abdominal pain, urinary symptoms, palpations, calf pain, neck pain. 63 y/o female with PMH HTN, HLD presents to ED sent in by Nephrologist (dr. christopher) for elevated /"something." Patient states for the last 3 days she has had 2/10 left sided headaches, with nausea, without vomiting. States that he blood pressure has been uncontrolled multiple times with systolic pressures avg in the 180s. States that her doctors are trying to get her blood pressure under control for while.   +Chronic back pain +Chronic L knee pain both unchanged with mild relief with Tylenol   Patient has been taking 4 BP medications and states that she has not been taking a 5th one for "months," which was just refilled recently, unsure of name at this time. States she has had full compliance with all 4 medications. Hydralazine 25 q8, Labetalol 100 BID, Spirolactone 100 QD, Clonidine TID. Took all meds this morning,  Patient was in [facility] ED in April 2019 for similar symptoms, was DC with outpatient follow up  Last dose of Tylenol was this morning, with relief.   Denies chest pain, SOB, abdominal pain, urinary symptoms, palpations, calf pain, neck pain.

## 2019-07-12 NOTE — ED ADULT NURSE REASSESSMENT NOTE - NS ED NURSE REASSESS COMMENT FT1
FAUZIA Keith at bedside, aware of repeat BP, patient reporting mild HA. No interventions ordered at this time.

## 2019-07-12 NOTE — H&P ADULT - NSICDXPASTMEDICALHX_GEN_ALL_CORE_FT
PAST MEDICAL HISTORY:  Hyperlipidemia     Hypertension     Uncomplicated asthma, unspecified asthma severity

## 2019-07-12 NOTE — H&P ADULT - NSHPREVIEWOFSYSTEMS_GEN_ALL_CORE
CONSTITUTIONAL:  No weight loss, fever, chills, weakness or fatigue.  HEENT:  Eyes:  No visual loss, blurred vision, double vision or yellow sclerae.   SKIN:  No rash or itching.  CARDIOVASCULAR:  No chest pain, chest pressure or chest discomfort. No palpitations.  RESPIRATORY:  Pt endorses chronic SOB  GASTROINTESTINAL:  No anorexia, nausea, vomiting or diarrhea. No abdominal pain or blood.  GENITOURINARY:  Denies hematuria, dysuria.   NEUROLOGICAL:  See HPI  MUSCULOSKELETAL:  No muscle, back pain, joint pain or stiffness.  HEMATOLOGIC:  No bleeding or bruising.  LYMPHATICS:  No enlarged nodes.   PSYCHIATRIC:  No history of depression or anxiety.  ENDOCRINOLOGIC:  No reports of sweating, cold or heat intolerance. No polyuria or polydipsia.  ALLERGIES:  No history of asthma, hives, eczema or rhinitis. CONSTITUTIONAL:  No weight loss, fever, chills, weakness or fatigue.  HEENT:  Eyes:  No visual loss, blurred vision, double vision or yellow sclerae.   SKIN:  No rash or itching.  CARDIOVASCULAR:  No chest pain, chest pressure or chest discomfort. No palpitations.  RESPIRATORY:  Pt endorses chronic SOB with exertion.  GASTROINTESTINAL:  No anorexia, nausea, vomiting or diarrhea. No abdominal pain or blood.  GENITOURINARY:  Denies hematuria, dysuria.   NEUROLOGICAL:  See HPI  MUSCULOSKELETAL:  No muscle, back pain, joint pain or stiffness.  HEMATOLOGIC:  No bleeding or bruising.  LYMPHATICS:  No enlarged nodes.   PSYCHIATRIC:  No history of depression or anxiety.  ENDOCRINOLOGIC:  No reports of sweating, cold or heat intolerance. No polyuria or polydipsia.  ALLERGIES:  No history of asthma, hives, eczema or rhinitis.

## 2019-07-12 NOTE — ASSESSMENT
[FreeTextEntry1] : Today I had the pleasure of seeing Magdalena Springer who is a 64 years old woman here for evaluation of resistant hypertension.\par \par Resistant Hypertension -- The patient has a strong family history of early onset hypertension and her sisters have had strokes.  Most secondary causes have been ruled out.  She has stopped taking advil and naproxen.  She still has social stressors as described above. Her BP is still elevated and not at goal.  I suspect there is a strong component of medication non adherence.  She does not know the names and doses of her medications and does not have a list but rather a picture of her bottles.  Given that she is having a new headache I do not feel that it is safe to titrate her oral medications to goal in an un monitored setting.  I referred the patient to the ED and she was admitted and I spoke to the admitting medicine resident.  I recommend not decreasing her blood pressure by more thant 25% in the next 24 hours.  She should be optimized on first line blood pressure medications.  norvasc 5 mg was given in the ED.  Ok to continue norvasc, would add chlorthalidone 25.  She should be put on a short acting ACE-I such as captopril so that it can be held if BP drops.  BP medications should have holding parameters to meet the goals noted above.

## 2019-07-12 NOTE — ED PROVIDER NOTE - PHYSICAL EXAMINATION
GEN: Well Appearing, Nontoxic, NAD  HEENT: NC/AT, Symm Facies. EOMI  CV:   +S1S2, RRR w/o m/g/r  RESP: CTAB w/o w/r/r  ABD: Soft, nt/nd,   EXT/MSK: No lower extremity edema or calf tenderness. WWP, palpable pulses. FROMx4  SKIN: No erythema, lesions or rash  Neuro: Grossly intact, AOX3 with normal speech, Sensation intact, motor intact.

## 2019-07-12 NOTE — REVIEW OF SYSTEMS
[Fever] : no fever [Chills] : no chills [Feeling Poorly] : not feeling poorly [Feeling Tired] : not feeling tired [Eyesight Problems] : no eyesight problems [Discharge From Eyes] : no purulent discharge from the eyes [Nosebleeds] : no nosebleeds [Nasal Discharge] : no nasal discharge [Chest Pain] : no chest pain [Palpitations] : no palpitations [Leg Claudication] : no intermittent leg claudication [Lower Ext Edema] : no lower extremity edema [Cough] : no cough [Shortness Of Breath] : shortness of breath [Abdominal Pain] : no abdominal pain [Vomiting] : no vomiting [Dysuria] : no dysuria [Incontinence] : no incontinence [Arthralgias] : arthralgias [Dizziness] : no dizziness [Depression] : no depression [Anxiety] : no anxiety [Easy Bleeding] : no tendency for easy bleeding [Easy Bruising] : no tendency for easy bruising [de-identified] : headache and nausea for three days

## 2019-07-12 NOTE — ED PROVIDER NOTE - NS ED ROS FT
Constitutional: No fever or chills  Eyes: No visual changes, eye pain or redness  CV: No chest pain or lower extremity edema  Resp: No SOB no cough  GI: No abd pain. +nausea. No vomiting. No diarrhea. No constipation.   : No dysuria, hematuria.   MSK: +chronic left knee and back pain unchanged  Skin: No rash  Neuro: +headache. No numbness or tingling. No weakness.  +high blood pressure

## 2019-07-12 NOTE — CHART NOTE - NSCHARTNOTEFT_GEN_A_CORE
Reference #: [id number]    Others' Prescriptions  Patient Name:	Magdalena Springer	YOB: 1955  Address:	[address] [place] [address]	Sex:	Female  Rx Written	Rx Dispensed	Drug	Quantity	Days Supply	Prescriber Name  07/08/2019	07/11/2019	alprazolam 0.5 mg tablet	15	15	Alejo Mclean Jr, MD  06/10/2019	06/17/2019	alprazolam 0.5 mg tablet	15	15	Alejo Mclean Jr, MD  05/04/2019	05/10/2019	alprazolam 0.5 mg tablet	15	15	Alejo Mclean Jr, MD  04/06/2019	04/06/2019	alprazolam 0.5 mg tablet	15	15	Alejo Mclean Jr, MD  03/09/2019	03/13/2019	alprazolam 0.5 mg tablet	15	15	Alejo Mclean Jr, MD  02/13/2019	02/21/2019	diazepam 5 mg tablet	1	1	Tad Serranoine  02/09/2019	02/13/2019	alprazolam 0.5 mg tablet	15	15	Alejo Mclean Jr, MD  12/29/2018	01/03/2019	alprazolam 0.5 mg tablet	15	15	Alejo Mclean Jr, MD  12/01/2018	12/03/2018	alprazolam 0.5 mg tablet	15	15	Alejo Mclean Jr, MD  11/02/2018	11/03/2018	alprazolam 0.5 mg tablet	15	15	Alejo Mclean Jr, MD

## 2019-07-12 NOTE — ED PROVIDER NOTE - PROGRESS NOTE DETAILS
Spoke with Dr. Shepherd who states that patient is not reliable with medication, tried multiple regimes which she does not follow. Wants her to be admitted to house hospitalist for better HTN control and education. - Inna Lam PA-C

## 2019-07-12 NOTE — H&P ADULT - PROBLEM SELECTOR PLAN 1
Pt sent in from nephrologist's office for SBP > 200. In the ED SBP found to be 216. S/p Labatelol 10mg IVP.   - Vitals q4hr  - Goal is a maximum decrease in SBP of 25% of initial.   - After speaking w/ nephrologist (Dr. Shepherd) goal of SBP in 140s - 150s prior to discharge.  in ED. Likely multifactorial cause. Patient with worsening pain in her knees which can be contributory. Noncompliance is another possibility. With regards to the patients headache, appears to be tension-like in nature. Patient without headache in Dr. Shepherd office nor in the ED making hypertensive crisis less likely. Also patient without chest pain or other anginal equivalents to suggest ACS. Labs notable for no IFEANYI or proteinuria. Neuro exam benign.   - S/p Labatelol 10mg IVP.   - Vitals q4hr  - Goal is a maximum decrease in SBP of 25% of initial in first 24 hours.  - After speaking w/ nephrologist (Dr. Shepherd) goal of SBP in 140s - 150s prior to discharge.  - Start captopril 12.5 mg po TID. Switch to long acting likely tomorrow.   - Can start norvasc 5 mg po daily as well as it takes time to reach steady state.  in ED. Likely multifactorial cause. Patient with worsening pain in her knees which can be contributory. Noncompliance is another possibility. With regards to the patients headache, appears to be tension-like in nature. Patient without headache in Dr. Shepherd office nor in the ED making hypertensive crisis less likely. Also patient without chest pain or other anginal equivalents to suggest ACS. Labs notable for no IFEANYI or proteinuria. Neuro exam benign.   - S/p Labatelol 10mg IVP.   - Vitals q4hr  - Goal is a maximum decrease in SBP of 25% of initial in first 24 hours.  - After speaking w/ nephrologist (Dr. Shepherd) goal of SBP in 140s - 150s prior to discharge.  - Start captopril 12.5 mg po TID. Switch to long acting likely tomorrow.   - Can start norvasc 5 mg po daily as well as it takes time to reach steady state.  - Monitor blood pressure

## 2019-07-12 NOTE — H&P ADULT - NSHPPHYSICALEXAM_GEN_ALL_CORE
GENERAL APPEARANCE: Well developed, well nourished, alert and cooperative. NAD.   HEENT:  PERRL, EOMI. External auditory canals normal, hearing grossly intact.  NECK: Neck supple, non-tender without lymphadenopathy, masses or thyromegaly.  CARDIAC: Normal S1 and S2. No S3, S4 or murmurs. Rhythm is regular.  LUNGS: Clear to auscultation and percussion without rales, rhonchi, wheezing or diminished breath sounds.  ABDOMEN: Positive bowel sounds. Soft, nondistended, nontender. No guarding or rebound.   EXTREMITIES: No significant deformity or joint abnormality. 1+ pitting edmea of b/l LEs up to shins. Peripheral pulses intact. No varicosities.  NEUROLOGICAL: CN II-XII intact. Strength and sensation symmetric and intact throughout.   SKIN: Skin normal color, texture and turgor with no lesions or eruptions.  PSYCHIATRIC: AOx3.Normal affect and behavior. GENERAL APPEARANCE: Well developed, well nourished, alert and cooperative. NAD.   HEENT:  PERRL, EOMI. External auditory canals normal, hearing grossly intact.  NECK: Neck supple, non-tender without lymphadenopathy, masses or thyromegaly.  CARDIAC: Normal S1 and S2. No S3, S4 or murmurs. Rhythm is regular. Extremities warm and well perfused. Trace 1+ LE pitting edema B/L to mid-shin. DP pulses 2+ B/L.  LUNGS: Clear to auscultation and percussion without rales, rhonchi, wheezing or diminished breath sounds.  ABDOMEN: Positive bowel sounds. Soft, nondistended, nontender. No guarding or rebound.   EXTREMITIES / MSK:  Possible trace effusion of left knee. Left knee with mild warmth compared to right but no erythema. Pain with active and passive ROM of both knees but slightly worse on left.   NEUROLOGICAL: CN II-XII intact. Strength and sensation symmetric and intact throughout. Gait normal. Babinski with toes downgoing.   SKIN: Skin normal color, texture and turgor with no lesions or eruptions.  PSYCHIATRIC: AOx3. Normal affect and behavior.

## 2019-07-12 NOTE — ED ADULT NURSE NOTE - OBJECTIVE STATEMENT
64 year old female patient presents ambulatory from nephrologist's office c/o HA x3 days and high blood pressure. Patient reports hx of HTN and states she ran out of one of her medications "for a little while" but is not sure which one. Patient states she went to nephrologists office today and was told they sent refill for prescription to pharmacy and then was told to come to ED. Patient reporting mild L sided HA x 3 days, currently 3/10. Patient reports taking tylenol this morning with little improvement. Patient states pain was associated with nauses yesterday. Denies current dizziness, lightheadedness, change in vision,  CP, palpitations, abd pain, n/v/d, fever, chills, numbness/tingling. Patient reporting chronic lower back pain/L knee pain, unchanged. Patient well appearing with no acute distress noted. PA at bedside for evaluation.

## 2019-07-12 NOTE — H&P ADULT - HISTORY OF PRESENT ILLNESS
Pt is a 63 yo F w/ resistant HTN, hypercholesterolemia, OA of b/l knees p/w HA and nausea for 3 days. Pt reported that she was feeling well up until 3 days PTA when she started to have a mild, frontal FERGUSON. Pt reported the headache was continuous and worsening over the course of three day. Pt reported that she then developed nausea, however did not have any emetic episodes. Pt report that she visited her nephrologist's office (Dr. Shepherd) and found that her blood pressure was elevated (SBP > 200). Pt reported that she was sent into the emergency room for management of her elevated pressures. Pt reported that she takes her medications as prescribed and last took all of her medications this morning. Pt reported no other associated symptoms including, CP, Abd pain, dysuria, or paresthesias.    In the ED, T(C): 36.6 (07-12-19 @ 13:11), Max: 36.7 HR: 70  (68 - 70) BP: 193/99 (193/99 - 216/108) RR: 16  (16 - 18) SpO2: 99%  (99% - 99%). Pt was asymptomatic in the ED. Pt was given labetalol 10mg IVP x1 in the ED. Pt is a 65 yo F w/ resistant HTN, hypercholesterolemia, OA of b/l knees p/w intermittent HA and nausea for 3 days. Pt reported that she was feeling well up until 3 days PTA when she started to have a mild, frontal FERGUSON. Pt reported the headache was intermittent, nonradiating, improved with tylenol.  Pt reported that she did have associated nausea, however did not have any emetic episodes. Of note, patient has been reporting worsening B/L knee osteoarthritis, worse on the left. Pt report that she visited her nephrologist's office (Dr. Shepherd) and found that her blood pressure was elevated (SBP > 200) however at this time she was headache free. Her last headache was the day prior to admission in the morning. Pt reported that she was sent into the emergency room for management of her elevated pressures. Pt reported that she takes her medications as prescribed and last took all of her medications this morning. Pt reported no other associated symptoms including, CP, SOB at rest, palpitations, blurry vision, weakness, dizziness, syncope, Abd pain, dysuria, or paresthesias.    In the ED, T(C): 36.6 (07-12-19 @ 13:11), Max: 36.7 HR: 70  (68 - 70) BP: 193/99 (193/99 - 216/108) RR: 16  (16 - 18) SpO2: 99%  (99% - 99%). Pt was asymptomatic in the ED. Pt was given labetalol 10mg IVP x1 in the ED.

## 2019-07-12 NOTE — ED ADULT NURSE NOTE - CHPI ED NUR SYMPTOMS NEG
no blurred vision/no loss of consciousness/no confusion/no nausea/no numbness/no vomiting/no dizziness/no weakness/no change in level of consciousness

## 2019-07-12 NOTE — H&P ADULT - NSHPLABSRESULTS_GEN_ALL_CORE
14.0   8.8   )-----------( 254      ( 2019 11:43 )             40.4     Auto Eosinophil # 0.1   / Auto Eosinophil % 1.2   / Auto Neutrophil # 5.7   / Auto Neutrophil % 64.8  / BANDS % x            143  |  102  |  13  ----------------------------<  86  3.9   |  29  |  0.94    Ca    9.2      2019 11:43  TPro  7.5  /  Alb  4.1  /  TBili  0.6  /  DBili  x   /  AST  14  /  ALT  12  /  AlkPhos  125<H>            Urinalysis Basic - ( 2019 11:47 )    Color: Colorless / Appearance: Clear / S.007 / pH: x  Gluc: x / Ketone: Negative  / Bili: Negative / Urobili: Negative   Blood: x / Protein: Negative / Nitrite: Negative   Leuk Esterase: Negative / RBC: 4 /hpf / WBC 1 /HPF   Sq Epi: x / Non Sq Epi: 1 /hpf / Bacteria: Negative 14.0   8.8   )-----------( 254      ( 2019 11:43 )             40.4     Auto Eosinophil # 0.1   / Auto Eosinophil % 1.2   / Auto Neutrophil # 5.7   / Auto Neutrophil % 64.8  / BANDS % x            143  |  102  |  13  ----------------------------<  86  3.9   |  29  |  0.94    Ca    9.2      2019 11:43  TPro  7.5  /  Alb  4.1  /  TBili  0.6  /  DBili  x   /  AST  14  /  ALT  12  /  AlkPhos  125<H>      Urinalysis Basic - ( 2019 11:47 )    Color: Colorless / Appearance: Clear / S.007 / pH: x  Gluc: x / Ketone: Negative  / Bili: Negative / Urobili: Negative   Blood: x / Protein: Negative / Nitrite: Negative   Leuk Esterase: Negative / RBC: 4 /hpf / WBC 1 /HPF   Sq Epi: x / Non Sq Epi: 1 /hpf / Bacteria: Negative    EKG (PERSONALLY INTERPRETED):  NSR. Intervals normal. Nonspecific st-t changes.

## 2019-07-12 NOTE — ED PROVIDER NOTE - CLINICAL SUMMARY MEDICAL DECISION MAKING FREE TEXT BOX
63 y/o female with PMH HTN, HLD presents to ED sent in by Nephrologist for elevated BP systolic of 210 and constant headache. Patient states she was here in April for the same problem, with negative workup including head CT. She complains of constant headache and elevated Bp, has been taking mediation, was told to come to ED by nephrologist today. no chest pain, no SOB, asymptomatic on physical exam. Will obtain blood work, EKG, nephrology consult regarding disposition. -ZR 63 y/o female with PMH HTN, HLD presents to ED sent in by Nephrologist for elevated BP systolic of 210 and constant headache. Patient states she was here in April for the same problem, with negative workup including head CT. She complains of constant headache and elevated Bp, has been taking mediation, was told to come to ED by nephrologist today. no chest pain, no SOB, asymptomatic on physical exam. Will obtain blood work, EKG, nephrology consult regarding disposition. dr Shepherd [phone] -ZR
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
## 2019-07-12 NOTE — H&P ADULT - PROBLEM SELECTOR PLAN 4
Pt w/ hx of depression after death of her son.   - C/w Escitalopram 10mg PO qd  - Trazadone 150mg qhs

## 2019-07-12 NOTE — H&P ADULT - PROBLEM SELECTOR PLAN 2
Pt w/ hx of resistant hypertension. Pt prescribed Amlodipine Besylate-Valsartan  MG PO qd,  Chlorthalidone 50 MG PO qd, HydrALAZINE HCl - 50 MG PO TID,   Labetalol HCl - 400 MG PO BID, Spironolactone 100 MG PO qd per outpt Nephro. Pt reported only taking Hydralazine 25mg TID, Labetalol 100mg BID, Spironolactone  100mg qd, and clonidine 0.1mg TID.   - Per outpt nephrology, will start pt on:  -- Amlodipine 5mg PO qd  -- Chlorthalidone 25mg PO qd  -- Captopril 25mg PO TID Pt w/ hx of resistant hypertension. Pt prescribed Amlodipine Besylate-Valsartan  MG PO qd,  Chlorthalidone 50 MG PO qd, HydrALAZINE HCl - 50 MG PO TID,   Labetalol HCl - 400 MG PO BID, Spironolactone 100 MG PO qd per outpt Nephro. Pt reported only taking Hydralazine 25mg TID, Labetalol 100mg BID, Spironolactone  100mg qd, and clonidine 0.1mg TID.   - Per outpt nephrology, will ultimately start the patient on:  -- Amlodipine 5mg PO qd  -- Chlorthalidone 25mg PO qd  -- Captopril 25mg PO TID Pt w/ hx of resistant hypertension. Pt prescribed Amlodipine Besylate-Valsartan  MG PO qd,  Chlorthalidone 50 MG PO qd, HydrALAZINE HCl - 50 MG PO TID,   Labetalol HCl - 400 MG PO BID, Spironolactone 100 MG PO qd per outpt Nephro. Pt reported only taking Hydralazine 25mg TID, Labetalol 100mg BID, Spironolactone  100mg qd, and clonidine 0.1mg TID.   - Per outpt nephrology, will ultimately start the patient on:  -- Amlodipine 5mg PO qd  -- Chlorthalidone 25mg PO qd  -- Captopril 25mg PO TID  -- Monitor blood pressure

## 2019-07-12 NOTE — ED ADULT NURSE REASSESSMENT NOTE - NS ED NURSE REASSESS COMMENT FT1
Pt received bed assignment. Pt aware. Report given to RN. [facility] CAGE aware of /95, okay to go upst. Pt stable for transport. Chart given to charge desk. Will cont to monitor.

## 2019-07-12 NOTE — H&P ADULT - ASSESSMENT
Pt is a 65 yo F w/ resistant HTN, hypercholesterolemia, OA of b/l knees p/w HA and nausea for 3 days found to SBP: 216 a/w hypertensive urgency. Pt is a 65 yo F w/ resistant HTN, hypercholesterolemia, OA of b/l knees who presents for hypertensive urgency.

## 2019-07-12 NOTE — H&P ADULT - PROBLEM SELECTOR PLAN 3
Pt w/ hx of OA of b/l knees  - Symptom control w/ tylenol prn Pt w/ hx of OA of b/l knees. Low suspicion for septic joint even in left knee with trace effusion given lack of systemic signs and symptoms.   - Symptom control w/ tylenol prn

## 2019-07-12 NOTE — H&P ADULT - PROBLEM SELECTOR PLAN 6
DVT ppx: No need for pharm ppx. Pt ambulating. Improve Score 0.4%.   Diet: Dash  Dispo: [facility]  Code: Full    Jericho Ray MD PGY2  Spectra [id number] DVT ppx: No need for pharm ppx. Pt ambulating. Improve Score 0.4%.   Diet: Dash  Dispo: [facility]; Will need follow up with Dr. Shepherd.   Code: Full    Jericho Ray MD PGY2  Spectra [id number]

## 2019-07-13 LAB
ALBUMIN SERPL ELPH-MCNC: 3.5 G/DL — SIGNIFICANT CHANGE UP (ref 3.3–5)
ALP SERPL-CCNC: 105 U/L — SIGNIFICANT CHANGE UP (ref 40–120)
ALT FLD-CCNC: 8 U/L — LOW (ref 10–45)
ANION GAP SERPL CALC-SCNC: 13 MMOL/L — SIGNIFICANT CHANGE UP (ref 5–17)
AST SERPL-CCNC: 11 U/L — SIGNIFICANT CHANGE UP (ref 10–40)
BILIRUB SERPL-MCNC: 0.6 MG/DL — SIGNIFICANT CHANGE UP (ref 0.2–1.2)
BUN SERPL-MCNC: 21 MG/DL — SIGNIFICANT CHANGE UP (ref 7–23)
CALCIUM SERPL-MCNC: 9.2 MG/DL — SIGNIFICANT CHANGE UP (ref 8.4–10.5)
CHLORIDE SERPL-SCNC: 104 MMOL/L — SIGNIFICANT CHANGE UP (ref 96–108)
CO2 SERPL-SCNC: 25 MMOL/L — SIGNIFICANT CHANGE UP (ref 22–31)
CREAT SERPL-MCNC: 1.08 MG/DL — SIGNIFICANT CHANGE UP (ref 0.5–1.3)
GLUCOSE SERPL-MCNC: 97 MG/DL — SIGNIFICANT CHANGE UP (ref 70–99)
HCT VFR BLD CALC: 36.6 % — SIGNIFICANT CHANGE UP (ref 34.5–45)
HCV AB S/CO SERPL IA: 0.06 S/CO — SIGNIFICANT CHANGE UP (ref 0–0.99)
HCV AB SERPL-IMP: SIGNIFICANT CHANGE UP
HGB BLD-MCNC: 12.4 G/DL — SIGNIFICANT CHANGE UP (ref 11.5–15.5)
MAGNESIUM SERPL-MCNC: 2.4 MG/DL — SIGNIFICANT CHANGE UP (ref 1.6–2.6)
MCHC RBC-ENTMCNC: 28.8 PG — SIGNIFICANT CHANGE UP (ref 27–34)
MCHC RBC-ENTMCNC: 33.9 GM/DL — SIGNIFICANT CHANGE UP (ref 32–36)
MCV RBC AUTO: 85.1 FL — SIGNIFICANT CHANGE UP (ref 80–100)
PHOSPHATE SERPL-MCNC: 4.4 MG/DL — SIGNIFICANT CHANGE UP (ref 2.5–4.5)
PLATELET # BLD AUTO: 226 K/UL — SIGNIFICANT CHANGE UP (ref 150–400)
POTASSIUM SERPL-MCNC: 4.1 MMOL/L — SIGNIFICANT CHANGE UP (ref 3.5–5.3)
POTASSIUM SERPL-SCNC: 4.1 MMOL/L — SIGNIFICANT CHANGE UP (ref 3.5–5.3)
PROT SERPL-MCNC: 6.5 G/DL — SIGNIFICANT CHANGE UP (ref 6–8.3)
RBC # BLD: 4.31 M/UL — SIGNIFICANT CHANGE UP (ref 3.8–5.2)
RBC # FLD: 11.7 % — SIGNIFICANT CHANGE UP (ref 10.3–14.5)
SODIUM SERPL-SCNC: 142 MMOL/L — SIGNIFICANT CHANGE UP (ref 135–145)
WBC # BLD: 9.6 K/UL — SIGNIFICANT CHANGE UP (ref 3.8–10.5)
WBC # FLD AUTO: 9.6 K/UL — SIGNIFICANT CHANGE UP (ref 3.8–10.5)

## 2019-07-13 PROCEDURE — 99232 SBSQ HOSP IP/OBS MODERATE 35: CPT

## 2019-07-13 RX ORDER — CAPTOPRIL 12.5 MG/1
6.25 TABLET ORAL THREE TIMES A DAY
Refills: 0 | Status: DISCONTINUED | OUTPATIENT
Start: 2019-07-13 | End: 2019-07-15

## 2019-07-13 RX ORDER — SODIUM CHLORIDE 9 MG/ML
500 INJECTION INTRAMUSCULAR; INTRAVENOUS; SUBCUTANEOUS ONCE
Refills: 0 | Status: COMPLETED | OUTPATIENT
Start: 2019-07-13 | End: 2019-07-13

## 2019-07-13 RX ADMIN — ESCITALOPRAM OXALATE 10 MILLIGRAM(S): 10 TABLET, FILM COATED ORAL at 11:09

## 2019-07-13 RX ADMIN — ATORVASTATIN CALCIUM 40 MILLIGRAM(S): 80 TABLET, FILM COATED ORAL at 21:16

## 2019-07-13 RX ADMIN — SODIUM CHLORIDE 1000 MILLILITER(S): 9 INJECTION INTRAMUSCULAR; INTRAVENOUS; SUBCUTANEOUS at 06:08

## 2019-07-13 RX ADMIN — Medication 300 MILLIGRAM(S): at 21:16

## 2019-07-13 NOTE — PROGRESS NOTE ADULT - PROBLEM SELECTOR PLAN 2
Pt w/ hx of resistant hypertension. Pt prescribed Amlodipine Besylate-Valsartan  MG PO qd,  Chlorthalidone 50 MG PO qd, HydrALAZINE HCl - 50 MG PO TID,   Labetalol HCl - 400 MG PO BID, Spironolactone 100 MG PO qd per outpt Nephro. Pt reported only taking Hydralazine 25mg TID, Labetalol 100mg BID, Spironolactone  100mg qd, and clonidine 0.1mg TID.   - Per outpt nephrology, will ultimately start the patient on:  -- Amlodipine 5mg PO qd  -- Chlorthalidone 25mg PO qd  -- Captopril 25mg PO TID    for now will give captopril 6.25 tid (uptitrate if needed)  -- Monitor blood pressure

## 2019-07-13 NOTE — PROGRESS NOTE ADULT - PROBLEM SELECTOR PLAN 1
in ED. Likely multifactorial cause. Patient with worsening pain in her knees which can be contributory. Noncompliance is another possibility. With regards to the patients headache, appears to be tension-like in nature. Patient without headache in Dr. Shepherd office nor in the ED making hypertensive crisis less likely. Also patient without chest pain or other anginal equivalents to suggest ACS. Labs notable for no IFEANYI or proteinuria. Neuro exam benign.   - S/p Labatelol 10mg IVP and captopril last night with low bp overnight   hold further bp medications today unless sbp >160. captopril changed to 6.25mg tid with hold parameters  - Vitals q4hr  - Goal is a maximum decrease in SBP of 25% of initial in first 24 hours.

## 2019-07-13 NOTE — PROGRESS NOTE ADULT - SUBJECTIVE AND OBJECTIVE BOX
Patient is a 64y old  Female who presents with a chief complaint of Hypertensive urgency (2019 14:22)        SUBJECTIVE / OVERNIGHT EVENTS: patient c/o generalized fatigue - states this happens every time her blood pressure drops.       MEDICATIONS  (STANDING):  atorvastatin 40 milliGRAM(s) Oral at bedtime  captopril 6.25 milliGRAM(s) Oral three times a day  escitalopram 10 milliGRAM(s) Oral daily  traZODone 300 milliGRAM(s) Oral at bedtime    MEDICATIONS  (PRN):  acetaminophen   Tablet .. 650 milliGRAM(s) Oral every 6 hours PRN Mild Pain (1 - 3)      Vital Signs Last 24 Hrs  T(C): 36.9 (2019 12:16), Max: 37 (2019 04:45)  T(F): 98.4 (2019 12:16), Max: 98.6 (2019 04:45)  HR: 80 (2019 12:16) (68 - 81)  BP: 132/77 (2019 12:16) (117/69 - 195/111)  BP(mean): --  RR: 18 (2019 12:16) (17 - 18)  SpO2: 96% (2019 12:16) (96% - 97%)  CAPILLARY BLOOD GLUCOSE        I&O's Summary      PHYSICAL EXAM:  GENERAL: NAD, sleepy  HEAD:  Atraumatic, Normocephalic  EYES: conjunctiva and sclera clear  NECK: No JVD  CHEST/LUNG: CTA b/l  HEART: S1 S2 RRR  ABDOMEN: +BS Soft, NT/ND  EXTREMITIES:  2+ DP Pulses, No c/c. trace b/l LE edema  NEUROLOGY: AAOx3, EOMI, PERRL, tongue midline, no facial droop, 5/5 MS b/l UE and LE     LABS:                        12.4   9.6   )-----------( 226      ( 2019 06:39 )             36.6     07-13    142  |  104  |  21  ----------------------------<  97  4.1   |  25  |  1.08    Ca    9.2      2019 06:35  Phos  4.4     07-13  Mg     2.4     07-13    TPro  6.5  /  Alb  3.5  /  TBili  0.6  /  DBili  x   /  AST  11  /  ALT  8<L>  /  AlkPhos  105  07-13          Urinalysis Basic - ( 2019 11:47 )    Color: Colorless / Appearance: Clear / S.007 / pH: x  Gluc: x / Ketone: Negative  / Bili: Negative / Urobili: Negative   Blood: x / Protein: Negative / Nitrite: Negative   Leuk Esterase: Negative / RBC: 4 /hpf / WBC 1 /HPF   Sq Epi: x / Non Sq Epi: 1 /hpf / Bacteria: Negative        RADIOLOGY & ADDITIONAL TESTS:    Imaging Personally Reviewed:  Consultant(s) Notes Reviewed:    Care Discussed with Consultants/Other Providers:

## 2019-07-14 LAB
ANION GAP SERPL CALC-SCNC: 10 MMOL/L — SIGNIFICANT CHANGE UP (ref 5–17)
BUN SERPL-MCNC: 18 MG/DL — SIGNIFICANT CHANGE UP (ref 7–23)
CALCIUM SERPL-MCNC: 8.5 MG/DL — SIGNIFICANT CHANGE UP (ref 8.4–10.5)
CHLORIDE SERPL-SCNC: 106 MMOL/L — SIGNIFICANT CHANGE UP (ref 96–108)
CO2 SERPL-SCNC: 27 MMOL/L — SIGNIFICANT CHANGE UP (ref 22–31)
CREAT SERPL-MCNC: 0.95 MG/DL — SIGNIFICANT CHANGE UP (ref 0.5–1.3)
GLUCOSE SERPL-MCNC: 87 MG/DL — SIGNIFICANT CHANGE UP (ref 70–99)
POTASSIUM SERPL-MCNC: 4.1 MMOL/L — SIGNIFICANT CHANGE UP (ref 3.5–5.3)
POTASSIUM SERPL-SCNC: 4.1 MMOL/L — SIGNIFICANT CHANGE UP (ref 3.5–5.3)
SODIUM SERPL-SCNC: 143 MMOL/L — SIGNIFICANT CHANGE UP (ref 135–145)

## 2019-07-14 PROCEDURE — 99232 SBSQ HOSP IP/OBS MODERATE 35: CPT

## 2019-07-14 RX ORDER — DOCUSATE SODIUM 100 MG
100 CAPSULE ORAL
Refills: 0 | Status: DISCONTINUED | OUTPATIENT
Start: 2019-07-14 | End: 2019-07-17

## 2019-07-14 RX ADMIN — Medication 300 MILLIGRAM(S): at 21:54

## 2019-07-14 RX ADMIN — Medication 100 MILLIGRAM(S): at 13:47

## 2019-07-14 RX ADMIN — ATORVASTATIN CALCIUM 40 MILLIGRAM(S): 80 TABLET, FILM COATED ORAL at 21:54

## 2019-07-14 RX ADMIN — CAPTOPRIL 6.25 MILLIGRAM(S): 12.5 TABLET ORAL at 21:54

## 2019-07-14 RX ADMIN — Medication 100 MILLIGRAM(S): at 17:46

## 2019-07-14 RX ADMIN — ESCITALOPRAM OXALATE 10 MILLIGRAM(S): 10 TABLET, FILM COATED ORAL at 11:08

## 2019-07-14 NOTE — PROGRESS NOTE ADULT - PROBLEM SELECTOR PLAN 2
Pt w/ hx of resistant hypertension. Pt prescribed Amlodipine Besylate-Valsartan  MG PO qd,  Chlorthalidone 50 MG PO qd, HydrALAZINE HCl - 50 MG PO TID,   Labetalol HCl - 400 MG PO BID, Spironolactone 100 MG PO qd per outpt Nephro. Pt reported only taking Hydralazine 25mg TID, Labetalol 100mg BID, Spironolactone  100mg qd, and clonidine 0.1mg TID.   - Per outpt nephrology, will ultimately start the patient on:  -- Amlodipine 5mg PO qd  -- Chlorthalidone 25mg PO qd  -- Captopril 25mg PO TID    for now will give captopril 6.25 tid (uptitrate if needed)  -- Monitor blood pressure Pt w/ hx of resistant hypertension. Pt prescribed Amlodipine Besylate-Valsartan  MG PO qd,  Chlorthalidone 50 MG PO qd, HydrALAZINE HCl - 50 MG PO TID,   Labetalol HCl - 400 MG PO BID, Spironolactone 100 MG PO qd per outpt Nephro. Pt reported only taking Hydralazine 25mg TID, Labetalol 100mg BID, Spironolactone  100mg qd, and clonidine 0.1mg TID.   - Per outpt nephrology, will ultimately start the patient on:  -- Amlodipine 5mg PO qd  -- Chlorthalidone 25mg PO qd  -- Captopril 25mg PO TID    for now has not gotten any bp medications for 2 days.   -- Monitor blood pressure

## 2019-07-14 NOTE — PROGRESS NOTE ADULT - PROBLEM SELECTOR PLAN 1
in ED. Likely multifactorial cause. Patient with worsening pain in her knees which can be contributory. Noncompliance is another possibility. With regards to the patients headache, appears to be tension-like in nature. Patient without headache in Dr. Shepherd office nor in the ED making hypertensive crisis less likely. Also patient without chest pain or other anginal equivalents to suggest ACS. Labs notable for no IFEANYI or proteinuria. Neuro exam benign.   - S/p Labatelol 10mg IVP and captopril last night with low bp overnight   hold further bp medications today unless sbp >160. captopril changed to 6.25mg tid with hold parameters  - Vitals q4hr  - Goal is a maximum decrease in SBP of 25% of initial in first 24 hours.  in ED. Likely multifactorial cause. Patient with worsening pain in her knees which can be contributory. Noncompliance is another possibility. With regards to the patients headache, appears to be tension-like in nature. Patient without headache in Dr. Shepherd office nor in the ED making hypertensive crisis less likely. Also patient without chest pain or other anginal equivalents to suggest ACS. Labs notable for no IFEANYI or proteinuria. Neuro exam benign.   - S/p Labatelol 10mg IVP and captopril resulting in significant lowering of blood pressure.   currently s/p 2 days without a single bp medications and blood pressure has not exceeded [id number]. will continue to monitor off bp medications given fatigue with low bp. still not neurologic deficits.   Renal f/u regarding HTN management  captopril 6.25mg tid with hold parameters sbp <160  - Vitals q4hr

## 2019-07-14 NOTE — PROGRESS NOTE ADULT - SUBJECTIVE AND OBJECTIVE BOX
Patient is a 64y old  Female who presents with a chief complaint of Hypertensive urgency (13 Jul 2019 19:37)        SUBJECTIVE / OVERNIGHT EVENTS: feeling better today but still fatgued       MEDICATIONS  (STANDING):  atorvastatin 40 milliGRAM(s) Oral at bedtime  captopril 6.25 milliGRAM(s) Oral three times a day  docusate sodium 100 milliGRAM(s) Oral two times a day  escitalopram 10 milliGRAM(s) Oral daily  traZODone 300 milliGRAM(s) Oral at bedtime    MEDICATIONS  (PRN):  acetaminophen   Tablet .. 650 milliGRAM(s) Oral every 6 hours PRN Mild Pain (1 - 3)      Vital Signs Last 24 Hrs  T(C): 36.9 (14 Jul 2019 14:53), Max: 37.4 (13 Jul 2019 20:16)  T(F): 98.4 (14 Jul 2019 14:53), Max: 99.3 (13 Jul 2019 20:16)  HR: 71 (14 Jul 2019 14:53) (69 - 75)  BP: 144/76 (14 Jul 2019 14:53) (135/74 - 157/82)  BP(mean): --  RR: 19 (14 Jul 2019 14:53) (18 - 19)  SpO2: 96% (14 Jul 2019 14:53) (96% - 97%)  CAPILLARY BLOOD GLUCOSE        I&O's Summary      PHYSICAL EXAM:  GENERAL: NAD, sleepy  HEAD:  Atraumatic, Normocephalic  EYES: conjunctiva and sclera clear  NECK: No JVD  CHEST/LUNG: CTA b/l  HEART: S1 S2 RRR  ABDOMEN: +BS Soft, NT/ND  EXTREMITIES:  2+ DP Pulses, No c/c. trace b/l LE edema  NEUROLOGY: AAOx3, EOMI, PERRL, tongue midline, no facial droop, 5/5 MS b/l UE and LE     LABS:                        12.4   9.6   )-----------( 226      ( 13 Jul 2019 06:39 )             36.6     07-14    143  |  106  |  18  ----------------------------<  87  4.1   |  27  |  0.95    Ca    8.5      14 Jul 2019 07:19  Phos  4.4     07-13  Mg     2.4     07-13    TPro  6.5  /  Alb  3.5  /  TBili  0.6  /  DBili  x   /  AST  11  /  ALT  8<L>  /  AlkPhos  105  07-13              RADIOLOGY & ADDITIONAL TESTS:    Imaging Personally Reviewed:  Consultant(s) Notes Reviewed:    Care Discussed with Consultants/Other Providers:

## 2019-07-15 ENCOUNTER — OTHER (OUTPATIENT)
Age: 64
End: 2019-07-15

## 2019-07-15 ENCOUNTER — TRANSCRIPTION ENCOUNTER (OUTPATIENT)
Age: 64
End: 2019-07-15

## 2019-07-15 PROCEDURE — 99232 SBSQ HOSP IP/OBS MODERATE 35: CPT

## 2019-07-15 RX ORDER — CAPTOPRIL 12.5 MG/1
6.25 TABLET ORAL
Qty: 0 | Refills: 0 | DISCHARGE
Start: 2019-07-15

## 2019-07-15 RX ORDER — CAPTOPRIL 12.5 MG/1
0.5 TABLET ORAL
Qty: 30 | Refills: 0
Start: 2019-07-15 | End: 2019-08-13

## 2019-07-15 RX ORDER — LABETALOL HCL 100 MG
1 TABLET ORAL
Qty: 0 | Refills: 0 | DISCHARGE

## 2019-07-15 RX ORDER — LISINOPRIL 2.5 MG/1
2.5 TABLET ORAL DAILY
Refills: 0 | Status: DISCONTINUED | OUTPATIENT
Start: 2019-07-15 | End: 2019-07-17

## 2019-07-15 RX ORDER — FLUTICASONE PROPIONATE 50 MCG
1 SPRAY, SUSPENSION NASAL
Qty: 0 | Refills: 0 | DISCHARGE

## 2019-07-15 RX ORDER — HYDRALAZINE HCL 50 MG
1 TABLET ORAL
Qty: 0 | Refills: 0 | DISCHARGE

## 2019-07-15 RX ORDER — ONDANSETRON 8 MG/1
4 TABLET, FILM COATED ORAL ONCE
Refills: 0 | Status: COMPLETED | OUTPATIENT
Start: 2019-07-15 | End: 2019-07-15

## 2019-07-15 RX ORDER — SPIRONOLACTONE 25 MG/1
1 TABLET, FILM COATED ORAL
Qty: 0 | Refills: 0 | DISCHARGE

## 2019-07-15 RX ADMIN — ONDANSETRON 4 MILLIGRAM(S): 8 TABLET, FILM COATED ORAL at 21:03

## 2019-07-15 RX ADMIN — Medication 300 MILLIGRAM(S): at 21:03

## 2019-07-15 RX ADMIN — Medication 650 MILLIGRAM(S): at 21:02

## 2019-07-15 RX ADMIN — ATORVASTATIN CALCIUM 40 MILLIGRAM(S): 80 TABLET, FILM COATED ORAL at 21:03

## 2019-07-15 RX ADMIN — Medication 100 MILLIGRAM(S): at 05:53

## 2019-07-15 RX ADMIN — LISINOPRIL 2.5 MILLIGRAM(S): 2.5 TABLET ORAL at 17:34

## 2019-07-15 RX ADMIN — Medication 650 MILLIGRAM(S): at 22:00

## 2019-07-15 RX ADMIN — ESCITALOPRAM OXALATE 10 MILLIGRAM(S): 10 TABLET, FILM COATED ORAL at 11:43

## 2019-07-15 RX ADMIN — Medication 100 MILLIGRAM(S): at 17:34

## 2019-07-15 NOTE — DISCHARGE NOTE PROVIDER - CARE PROVIDER_API CALL
Dotty Archibald)  Internal Medicine  [address]  [place] [address]  Phone: [phone]  Fax: [phone]  Follow Up Time:     Umberto Shepherd)  Internal Medicine  [address] [place] [address]  Phone: [phone]  Fax: [phone]  Follow Up Time:

## 2019-07-15 NOTE — PROGRESS NOTE ADULT - ATTENDING COMMENTS
[facility]  [phone] d/c planning  [facility]  [phone]

## 2019-07-15 NOTE — PROGRESS NOTE ADULT - SUBJECTIVE AND OBJECTIVE BOX
Patient is a 64y old  Female who presents with a chief complaint of Hypertensive urgency (15 Jul 2019 15:44)        SUBJECTIVE / OVERNIGHT EVENTS: no acute complaints. overall fatigue improved      MEDICATIONS  (STANDING):  atorvastatin 40 milliGRAM(s) Oral at bedtime  docusate sodium 100 milliGRAM(s) Oral two times a day  escitalopram 10 milliGRAM(s) Oral daily  lisinopril 2.5 milliGRAM(s) Oral daily  traZODone 300 milliGRAM(s) Oral at bedtime    MEDICATIONS  (PRN):  acetaminophen   Tablet .. 650 milliGRAM(s) Oral every 6 hours PRN Mild Pain (1 - 3)      Vital Signs Last 24 Hrs  T(C): 37.2 (15 Jul 2019 19:10), Max: 37.2 (15 Jul 2019 19:10)  T(F): 98.9 (15 Jul 2019 19:10), Max: 98.9 (15 Jul 2019 19:10)  HR: 68 (15 Jul 2019 19:10) (66 - 74)  BP: 167/92 (15 Jul 2019 19:10) (131/81 - 174/91)  BP(mean): --  RR: 18 (15 Jul 2019 19:10) (18 - 18)  SpO2: 96% (15 Jul 2019 19:10) (96% - 98%)  CAPILLARY BLOOD GLUCOSE        I&O's Summary      PHYSICAL EXAM:  GENERAL: NAD  HEAD:  Atraumatic, Normocephalic  EYES: conjunctiva and sclera clear  NECK: No JVD  CHEST/LUNG: CTA b/l  HEART: S1 S2 RRR  ABDOMEN: +BS Soft, NT/ND  EXTREMITIES:  2+ DP Pulses, No c/c. no LE edema, brace on left knee  NEUROLOGY: AAOx3, PERRL, EOMI, no facial droop, tongue midline, 5/5 MS b/l UE and LE   SKIN: No rashes or lesions    LABS:    07-14    143  |  106  |  18  ----------------------------<  87  4.1   |  27  |  0.95    Ca    8.5      14 Jul 2019 07:19                RADIOLOGY & ADDITIONAL TESTS:    Imaging Personally Reviewed:   Consultant(s) Notes Reviewed:    Care Discussed with Consultants/Other Providers: d/w Renal - Dr. Shepherd regarding bp management

## 2019-07-15 NOTE — DISCHARGE NOTE PROVIDER - NSDCFUADDAPPT_GEN_ALL_CORE_FT
please follow up with Dr. Shepherd tomorrow  follow up with your primary care doctor in 1 week   please follow up with ophthalmology for your blurry vision - you may need glasses  follow up with neurology for forgetfulness

## 2019-07-15 NOTE — DISCHARGE NOTE PROVIDER - HOSPITAL COURSE
63 yo F w/ resistant HTN, hypercholesterolemia, OA of b/l knees p/w intermittent HA and nausea for 3 days. Pt reported that she was feeling well up until 3 days PTA when she started to have a mild, frontal HA. 63 yo F w/ resistant HTN, hypercholesterolemia, OA of b/l knees p/w intermittent HA and nausea for 3 days. Pt reported that she was feeling well up until 3 days PTA when she started to have a mild, frontal HA.    On ER presentation her SBP was 216. She was given iv Labetalol 10 mg in the ER. She has h/o resistance of BP meds.  She was started with Captopril 12.5 mg tid, amlodipine 5 mg daily. All of her home antihypertensives are on hold. Her BP is stable at this time and well tolerating current regimens. Goal SBP is 150-160. She is hemodynamically stable to be discharged home today with Captopril 6.25 mg bid, and amlodipine 5 mg daily. Spoke to Attending. 63 yo F w/ resistant HTN, hypercholesterolemia, OA of b/l knees p/w intermittent HA and nausea for 3 days. Pt reported that she was feeling well up until 3 days PTA when she started to have a mild, frontal HA.    On ER presentation her SBP was 216. She was given iv Labetalol 10 mg in the ER. She has h/o resistance of BP meds.  She was started with Captopril 12.5 mg tid, amlodipine 5 mg daily. All of her home antihypertensives are on hold. Her BP is stable at this time and well tolerating current regimens. Goal SBP is 150-160. She is hemodynamically stable to be discharged home today with lisinopril 2.5mg qd. patient has follow up appointment with nephrology -Dr. Shepherd tomorrow and will have bp checked. patient to follow up with ophthalmology for intermittent blurry vision as oupatient.

## 2019-07-15 NOTE — PROGRESS NOTE ADULT - PROBLEM SELECTOR PLAN 2
Pt w/ hx of resistant hypertension. Pt prescribed Amlodipine Besylate-Valsartan  MG PO qd,  Chlorthalidone 50 MG PO qd, HydrALAZINE HCl - 50 MG PO TID,   Labetalol HCl - 400 MG PO BID, Spironolactone 100 MG PO qd per outpt Nephro. Pt reported only taking Hydralazine 25mg TID, Labetalol 100mg BID, Spironolactone  100mg qd, and clonidine 0.1mg TID.   - Per outpt nephrology, will ultimately start the patient on:  -- Amlodipine 5mg PO qd  -- Chlorthalidone 25mg PO qd  -- Captopril 25mg PO TID    for now has not gotten any bp medications for 2 days.   -- Monitor blood pressure Pt w/ hx of resistant hypertension. Pt prescribed Amlodipine Besylate-Valsartan  MG PO qd,  Chlorthalidone 50 MG PO qd, HydrALAZINE HCl - 50 MG PO TID,   Labetalol HCl - 400 MG PO BID, Spironolactone 100 MG PO qd per outpt Nephro. Pt reported only taking Hydralazine 25mg TID, Labetalol 100mg BID, Spironolactone  100mg qd, and clonidine 0.1mg TID.   will start lisinopril 2.5mg qd and monitor bp overnight - d/w Dr. Shepherd

## 2019-07-15 NOTE — DISCHARGE NOTE PROVIDER - NSDCCPCAREPLAN_GEN_ALL_CORE_FT
PRINCIPAL DISCHARGE DIAGNOSIS  Diagnosis: Hypertension  Assessment and Plan of Treatment: PRINCIPAL DISCHARGE DIAGNOSIS  Diagnosis: Hypertension  Assessment and Plan of Treatment: uncontrolled and hypertensive urgency -- now resolved, cont Captopril and Amlodipine as prescribed, follow up with PCP and Renal doctor.      SECONDARY DISCHARGE DIAGNOSES  Diagnosis: Hypercholesteremia  Assessment and Plan of Treatment: cont current home med    Diagnosis: Depression  Assessment and Plan of Treatment: stable, cont current home meds PRINCIPAL DISCHARGE DIAGNOSIS  Diagnosis: Hypertension  Assessment and Plan of Treatment: uncontrolled and hypertensive urgency -- now resolved, continue lisinopril 2.5mg daily. follow up with PCP and Renal doctor. You have an apointment with Dr. Shepherd tomorrow.      SECONDARY DISCHARGE DIAGNOSES  Diagnosis: Blurry vision  Assessment and Plan of Treatment: you were seen by neurology and had ct head negative for stroke. follow up with ophtholmology in 1-2 weeks.    Diagnosis: Hypercholesteremia  Assessment and Plan of Treatment: cont current home med    Diagnosis: Depression  Assessment and Plan of Treatment: stable, cont current home meds PRINCIPAL DISCHARGE DIAGNOSIS  Diagnosis: Hypertension  Assessment and Plan of Treatment: uncontrolled and hypertensive urgency -- now resolved, continue lisinopril 2.5mg daily. follow up with PCP and Renal doctor. You have an apointment with Dr. Shepherd tomorrow.      SECONDARY DISCHARGE DIAGNOSES  Diagnosis: Hypercholesteremia  Assessment and Plan of Treatment: cont current home med    Diagnosis: Depression  Assessment and Plan of Treatment: stable, cont current home meds    Diagnosis: Blurry vision  Assessment and Plan of Treatment: you were seen by neurology and had ct head negative for stroke. follow up with ophtholmology in 1-2 weeks.

## 2019-07-15 NOTE — DISCHARGE NOTE PROVIDER - CARE PROVIDERS DIRECT ADDRESSES
[email or web address]

## 2019-07-15 NOTE — PROGRESS NOTE ADULT - PROBLEM SELECTOR PLAN 1
in ED. Likely multifactorial cause. Patient with worsening pain in her knees which can be contributory. Noncompliance is another possibility. With regards to the patients headache, appears to be tension-like in nature. Patient without headache in Dr. Shepherd office nor in the ED making hypertensive crisis less likely. Also patient without chest pain or other anginal equivalents to suggest ACS. Labs notable for no IFEANYI or proteinuria. Neuro exam benign.   - S/p Labatelol 10mg IVP and captopril resulting in significant lowering of blood pressure.   currently s/p 2 days without a single bp medications and blood pressure has not exceeded [id number]. will continue to monitor off bp medications given fatigue with low bp. still not neurologic deficits.   Renal f/u regarding HTN management  captopril 6.25mg tid with hold parameters sbp <160  - Vitals q4hr  in ED. Likely multifactorial cause. Patient with worsening pain in her knees which can be contributory. Noncompliance is another possibility. With regards to the patients headache, appears to be tension-like in nature. Patient without headache in Dr. Shepherd office nor in the ED making hypertensive crisis less likely. Also patient without chest pain or other anginal equivalents to suggest ACS. Labs notable for no IFEANYI or proteinuria. Neuro exam benign.   - S/p Labatelol 10mg IVP and captopril resulting in significant lowering of blood pressure.   patient has not requiring captopril consistently due to bp <160. d/w Dr. Shepherd - will give lisinopril 2.5 mg qd today and monitor bp overnight.   will continue to monitor. still no focal deficits on exam.   - Vitals q4hr

## 2019-07-16 PROCEDURE — 99222 1ST HOSP IP/OBS MODERATE 55: CPT | Mod: GC

## 2019-07-16 PROCEDURE — 99239 HOSP IP/OBS DSCHRG MGMT >30: CPT

## 2019-07-16 PROCEDURE — 70450 CT HEAD/BRAIN W/O DYE: CPT | Mod: 26

## 2019-07-16 RX ORDER — POLYETHYLENE GLYCOL 3350 17 G/17G
17 POWDER, FOR SOLUTION ORAL ONCE
Refills: 0 | Status: COMPLETED | OUTPATIENT
Start: 2019-07-16 | End: 2019-07-16

## 2019-07-16 RX ORDER — LISINOPRIL 2.5 MG/1
1 TABLET ORAL
Qty: 30 | Refills: 0
Start: 2019-07-16 | End: 2019-08-14

## 2019-07-16 RX ADMIN — POLYETHYLENE GLYCOL 3350 17 GRAM(S): 17 POWDER, FOR SOLUTION ORAL at 21:33

## 2019-07-16 RX ADMIN — ATORVASTATIN CALCIUM 40 MILLIGRAM(S): 80 TABLET, FILM COATED ORAL at 21:33

## 2019-07-16 RX ADMIN — LISINOPRIL 2.5 MILLIGRAM(S): 2.5 TABLET ORAL at 05:01

## 2019-07-16 RX ADMIN — Medication 100 MILLIGRAM(S): at 05:01

## 2019-07-16 RX ADMIN — Medication 650 MILLIGRAM(S): at 18:10

## 2019-07-16 RX ADMIN — Medication 100 MILLIGRAM(S): at 17:34

## 2019-07-16 RX ADMIN — ESCITALOPRAM OXALATE 10 MILLIGRAM(S): 10 TABLET, FILM COATED ORAL at 11:16

## 2019-07-16 RX ADMIN — Medication 300 MILLIGRAM(S): at 21:33

## 2019-07-16 RX ADMIN — Medication 650 MILLIGRAM(S): at 17:36

## 2019-07-16 NOTE — PROGRESS NOTE ADULT - PROBLEM SELECTOR PLAN 3
Pt w/ hx of OA of b/l knees. Low suspicion for septic joint even in left knee with trace effusion given lack of systemic signs and symptoms.   - Symptom control w/ tylenol prn

## 2019-07-16 NOTE — CONSULT NOTE ADULT - ATTENDING COMMENTS
I have seen this patient with the fellow and agree with their assessment and plan. In addition, this is likely a renin mediated HTN that has had an excellent response to bp control with low dose ACEI. I would continue that. NICOLLE was ruled out.  Would avoid other meds  His blurry vision might not be related to his BP fluctuations and might be non hemodynamic such as cataracts, please evaluate and rule out other causes.    Eddie Stevens MD  Cell   Pager   Office

## 2019-07-16 NOTE — PROGRESS NOTE ADULT - PROBLEM SELECTOR PLAN 2
Pt w/ hx of resistant hypertension. Pt prescribed Amlodipine Besylate-Valsartan  MG PO qd,  Chlorthalidone 50 MG PO qd, HydrALAZINE HCl - 50 MG PO TID,   Labetalol HCl - 400 MG PO BID, Spironolactone 100 MG PO qd per outpt Nephro. Pt reported only taking Hydralazine 25mg TID, Labetalol 100mg BID, Spironolactone  100mg qd, and clonidine 0.1mg TID.   will start lisinopril 2.5mg qd and monitor bp overnight - d/w Dr. Shepherd

## 2019-07-16 NOTE — CONSULT NOTE ADULT - PROBLEM SELECTOR RECOMMENDATION 9
Unclear etiology possibly 2/2 pain +/- compliance per chart.  On admission /100, drop to 117/69, currently on lisinopril 2.5 mg PO only w/ sBP range 127-137.    PLAN:   - c/w lisinopril 2.5 mg PO daily, monitor BP closely (goal <160)  - consider ophthalmopathy consult or referral

## 2019-07-16 NOTE — PROGRESS NOTE ADULT - ASSESSMENT
Pt is a 63 yo F w/ resistant HTN, hypercholesterolemia, OA of b/l knees who presents for hypertensive urgency.
Pt is a 63 yo F w/ resistant HTN, hypercholesterolemia, OA of b/l knees who presents for hypertensive urgency.
Pt is a 65 yo F w/ resistant HTN, hypercholesterolemia, OA of b/l knees who presents for hypertensive urgency.
Pt is a 65 yo F w/ resistant HTN, hypercholesterolemia, OA of b/l knees who presents for hypertensive urgency.

## 2019-07-16 NOTE — CONSULT NOTE ADULT - SUBJECTIVE AND OBJECTIVE BOX
[facility] DIVISION OF KIDNEY DISEASES AND HYPERTENSION -- INITIAL CONSULT NOTE  --------------------------------------------------------------------------------  HPI:  64F PMHx resistant HTN, hypercholesterolemia, OA of b/l knees who presents for intermittent headache w/ nausea found to have hypertensive urgency (sBP 216) s/p ED tx Labetalol IVP and captopril causing rapid drop /69.  Nephrology consulted for BP management.      On admission /100, drop to 117/69, currently on lisinopril 2.5 mg PO only w/ sBP range 127-137.  Pt endorse blurry vision both during hypotension and hypertension episode.    {[id number]}  PAST HISTORY  --------------------------------------------------------------------------------  PAST MEDICAL & SURGICAL HISTORY:  Uncomplicated asthma, unspecified asthma severity  Hypertension  Hyperlipidemia  No significant past surgical history    FAMILY HISTORY:  FH: myocardial infarction  FH: HTN (hypertension)  Family history of CVA    PAST SOCIAL HISTORY:    ALLERGIES & MEDICATIONS  --------------------------------------------------------------------------------  Allergies    No Known Allergies    Intolerances      Standing Inpatient Medications  atorvastatin 40 milliGRAM(s) Oral at bedtime  docusate sodium 100 milliGRAM(s) Oral two times a day  escitalopram 10 milliGRAM(s) Oral daily  lisinopril 2.5 milliGRAM(s) Oral daily  traZODone 300 milliGRAM(s) Oral at bedtime    PRN Inpatient Medications  acetaminophen   Tablet .. 650 milliGRAM(s) Oral every 6 hours PRN      REVIEW OF SYSTEMS  --------------------------------------------------------------------------------  + for blurry vision  - denies c/p, palpitation, headache    All other systems were reviewed and are negative, except as noted.    VITALS/PHYSICAL EXAM  --------------------------------------------------------------------------------  T(C): 36.9 (07-16-19 @ 12:00), Max: 37.2 (07-15-19 @ 19:10)  HR: 62 (07-16-19 @ 12:00) (62 - 85)  BP: 130/76 (07-16-19 @ 12:00) (127/76 - 167/92)  RR: 18 (07-16-19 @ 12:00) (18 - 18)  SpO2: 95% (07-16-19 @ 12:00) (95% - 99%)  Wt(kg): --        Physical Exam:  	Gen: NAD, well-appearing  	HEENT: peripheral lens changes bilateral  	Pulm: CTA B/L  	CV: RRR, S1S2  	Abd: +BS, soft, nontender/nondistended              Ext: no edema     LABS/STUDIES  --------------------------------------------------------------------------------      Creatinine Trend:  SCr 0.95 [07-14 @ 07:19]  SCr 1.08 [07-13 @ 06:35]  SCr 0.94 [07-12 @ 11:43]    Urinalysis - [07-12-19 @ 11:47]      Color Colorless / Appearance Clear / SG 1.007 / pH 6.5      Gluc Negative / Ketone Negative  / Bili Negative / Urobili Negative       Blood Negative / Protein Negative / Leuk Est Negative / Nitrite Negative      RBC 4 / WBC 1 / Hyaline 1 / Gran  / Sq Epi  / Non Sq Epi 1 / Bacteria Negative      HbA1c 5.7      [08-17-17 @ 01:03]    HCV 0.06, Nonreact      [07-13-19 @ 10:53]  HIV Nonreact      [02-01-18 @ 00:38]    Syphilis Screen (Treponema Pallidum Ab) Negative      [02-01-18 @ 00:38]
Admitting Diagnosis:  Essential hypertension (I10): ESSENTIAL (PRIMARY) HYPERTENSION      HPI:  This is a 64y year old Female with the below past medical history who presents with the chief complaint of blurry vision with focusing, trouble with reading and with HTN. admitted for HTN crisis systolic over 210s. difficult to control HTN. hx of GI ulcer many years ago with bleed was told not to take aspirin. admits to not wearing her reading glasses she has. has cataracts. spouse concerned for dementia eval because she repeats questions and is forgetful and he mother has dementia. she denies focal weakness or sensory chagnes. denies vision loss or dimming. denies headache now, states its improved. states her speech was "tongue twisted" earlier but better now. ct head done unremarkable today. no LOC or seizure like activity      Past Medical History:  Uncomplicated asthma, unspecified asthma severity (J45.909)  Hypertension (I10)  Hyperlipidemia (E78.5)      Past Surgical History:  No significant past surgical history ([id number])      Social History:  No toxic habits    Family History:  FAMILY HISTORY:  FH: myocardial infarction  FH: HTN (hypertension)  Family history of CVA      Allergies:  No Known Allergies      ROS:  Constitutional: Patient offers no complaints of fevers or significant weight loss  Ears, Nose, Mouth and Throat: The patient presents with no abnormalities of the head, ears, eyes, nose or throat  Skin: Patient offers no concerns of new rashes or lesions  Respiratory: The patient presents with no abnormalities of the respiratory tract  Cardiovascular: The patient presents with no cardiac abnormalities  Gastrointestinal: The patient presents with no abnormalities of the GI system  Genitourinary: The patient presents with no dysuria, hematuria or frequent urination  Neurological: See HPI  Endocrine: Patient offers no complaints of excessive thirst, urination, or heat/cold intolerance    Advanced care planning reviewed and noted in the chart.    Medications:  acetaminophen   Tablet .. 650 milliGRAM(s) Oral every 6 hours PRN  atorvastatin 40 milliGRAM(s) Oral at bedtime  docusate sodium 100 milliGRAM(s) Oral two times a day  escitalopram 10 milliGRAM(s) Oral daily  lisinopril 2.5 milliGRAM(s) Oral daily  traZODone 300 milliGRAM(s) Oral at bedtime      Labs:          CAPILLARY BLOOD GLUCOSE              Female    Vitals:  Vital Signs Last 24 Hrs  T(C): 36.9 (16 Jul 2019 17:06), Max: 37.2 (15 Jul 2019 19:10)  T(F): 98.4 (16 Jul 2019 17:06), Max: 98.9 (15 Jul 2019 19:10)  HR: 68 (16 Jul 2019 17:06) (62 - 85)  BP: 147/76 (16 Jul 2019 17:06) (127/76 - 167/92)  BP(mean): --  RR: 18 (16 Jul 2019 17:06) (18 - 18)  SpO2: 96% (16 Jul 2019 17:06) (95% - 99%)    NEUROLOGICAL EXAM:    Mental status: Awake, alert, and in no apparent distress. stting on commode and getting up to lay in bed. Oriented to person, place and time. Language function is normal. Recent memory, digit span and concentration were normal.     Cranial Nerves: Pupils were equal, round, reactive to light. Extraocular movements were intact. Visual field were full. Fundoscopic exam was deferred. Facial sensation was intact to light touch. There was no facial asymmetry. The palate was upgoing symmetrically and tongue was midline. Hearing acuity was intact to finger rub AU. Shoulder shrug was full bilaterally. tested each eye individually cover and uncover no change.    Motor exam: Bulk and tone were normal. Strength was 5/5 in all four extremities. Fine finger movements were symmetric and normal. There was no pronator drift    Reflexes: 2+ in the bilateral upper extremities. 2+ in the bilateral lower extremities. Toes were downgoing bilaterally.     Sensation: Intact to light touch, temperature, vibration and proprioception.     Coordination: Finger-nose-finger and heel-to-shin was without dysmetria.     Gait: Narrow base and steady. Romberg was negative.       < from: CT Head No Cont (07.16.19 @ 13:47) >  EXAM:  CT BRAIN                            PROCEDURE DATE:  07/16/2019            INTERPRETATION:  INDICATIONS: Blurry vision and headaches for 3 days,   hypertension    TECHNIQUE:  Serial axial images were obtained from the skull base to the   vertex without intravenous contrast.    COMPARISON EXAMINATION: None.    FINDINGS:    VENTRICLES AND SULCI: Asymmetry to the temporal horns of the lateral   ventricle with the right being well seen better than the left. The   patient is somewhat asymmetric in the scanner. Findings are likely   positional INTRA-AXIAL:  No mass, blood or abnormal attenuation is seen.  EXTRA-AXIAL:  No mass or collection is seen.  VISUALIZED SINUSES:  Clear.  VISUALIZED MASTOIDS:  Clear.  CALVARIUM:  Normal.  MISCELLANEOUS:  None.    IMPRESSION:  No acute abnormality. Asymmetry to the temporal horns of the   lateral ventricle the right greater than left likely positional.                    MARYANN GUERIN M.D., ATTENDING RADIOLOGIST  This document has been electronically signed. Jul 16 2019  2:06PM          < end of copied text >

## 2019-07-16 NOTE — PROGRESS NOTE ADULT - PROBLEM SELECTOR PLAN 6
DVT ppx: No need for pharm ppx. Pt ambulating. Improve Score 0.4%.   Diet: Dash  Dispo: [facility] home; Will need follow up with Dr. Shepherd.   Code: Full

## 2019-07-16 NOTE — PROGRESS NOTE ADULT - ATTENDING COMMENTS
d/c planning  [facility]  [phone] if no further neurologic testing, patient can follow up with optho outpatient for intermittent blurry vision.   Dr. M. Luke  [facility] Hospitalist  [phone] if no further neurologic testing, patient can follow up with optho outpatient for intermittent blurry vision.   discharge time 42 minutes  Dr. HOLLINS [facility] Hospitalist  [phone]

## 2019-07-16 NOTE — PROGRESS NOTE ADULT - PROBLEM SELECTOR PLAN 1
in ED. Likely multifactorial cause. Patient with worsening pain in her knees which can be contributory. Noncompliance is another possibility. With regards to the patients headache, appears to be tension-like in nature. Patient without headache in Dr. Shepherd office nor in the ED making hypertensive crisis less likely. Also patient without chest pain or other anginal equivalents to suggest ACS. Labs notable for no IFEANYI or proteinuria. Neuro exam benign.   - S/p Labatelol 10mg IVP and captopril resulting in significant lowering of blood pressure.   patient has not requiring captopril consistently due to bp <160. d/w Dr. Shepherd - will give lisinopril 2.5 mg qd today and monitor bp overnight.   will continue to monitor. still no focal deficits on exam.   - Vitals q4hr  in ED. Likely multifactorial cause. Patient with worsening pain in her knees which can be contributory. Noncompliance is another possibility. With regards to the patients headache, appears to be tension-like in nature. Patient without headache in Dr. Shepherd office nor in the ED making hypertensive crisis less likely. Also patient without chest pain or other anginal equivalents to suggest ACS. Labs notable for no IFEANYI or proteinuria. Neuro exam benign.   - S/p Labatelol 10mg IVP and captopril resulting in significant lowering of blood pressure.   patient has not requiring captopril consistently due to bp <160. d/w Dr. Shepherd - will bp well controlled on lisinopril 2.5 mg qd  - per REnal okay to discharge on lisinopril 2.5mg with follow up tomorrow. blurry vision - ct head without acute cva. Neuro consult called. patient should see optho as outpatient for blurry vision.    - Vitals q4hr

## 2019-07-16 NOTE — CONSULT NOTE ADULT - ASSESSMENT
64F PMHx resistant HTN, hypercholesterolemia, OA of b/l knees who presents for intermittent headache w/ nausea found to have hypertensive urgency (sBP 216) s/p ED tx Labetalol IVP and captopril causing rapid drop /69.  Nephrology consulted for BP management.      On admission /100, drop to 117/69, currently on lisinopril 2.5 mg PO only w/ sBP range 127-137.  Pt endorse blurry vision both during hypotension and hypertension episode.
This is a 64y year old Female with the below past medical history who presents with the chief complaint of blurry vision with focusing, trouble with reading and with HTN. admitted for HTN crisis systolic over 210s. difficult to control HTN. hx of GI ulcer many years ago with bleed was told not to take aspirin. admits to not wearing her reading glasses she has. has cataracts. spouse concerned for dementia eval because she repeats questions and is forgetful and he mother has dementia. she denies focal weakness or sensory chagnes. denies vision loss or dimming. denies headache now, states its improved. states her speech was "tongue twisted" earlier but better now. ct head done unremarkable today. no LOC or seizure like activity    normal non focal neurological exam   normal neuro imaging by ct    -HTN management  -do not suspect acute cerebral ischemic event  -with severe HTN at risk for ischemia, if GI ok would recommend ASA 81mg daily  -use reading glasses if helpful  -consider carotid doppler study but not part of HTN/blurred vision workup, can be done nonurgently.  -suspect headache and nonspecific b/l blurred vision related to severe HTN  -outpatient neurological eval for dementia once discharged, spouse concerned for forgetfulness over past 1 year  -dvt prophylaxis  -no neuro objection to d/c once medically cleared

## 2019-07-16 NOTE — PROGRESS NOTE ADULT - SUBJECTIVE AND OBJECTIVE BOX
Patient is a 64y old  Female who presents with a chief complaint of Hypertensive urgency (15 Jul 2019 15:44)        SUBJECTIVE / OVERNIGHT EVENTS: c/o blurry vision today - noticed while reading unclear how long she has had it. she does state that she has had the blurry vision event with elevated blood pressures.       MEDICATIONS  (STANDING):  atorvastatin 40 milliGRAM(s) Oral at bedtime  docusate sodium 100 milliGRAM(s) Oral two times a day  escitalopram 10 milliGRAM(s) Oral daily  lisinopril 2.5 milliGRAM(s) Oral daily  traZODone 300 milliGRAM(s) Oral at bedtime    MEDICATIONS  (PRN):  acetaminophen   Tablet .. 650 milliGRAM(s) Oral every 6 hours PRN Mild Pain (1 - 3)      Vital Signs Last 24 Hrs  T(C): 36.9 (16 Jul 2019 12:00), Max: 37.2 (15 Jul 2019 19:10)  T(F): 98.4 (16 Jul 2019 12:00), Max: 98.9 (15 Jul 2019 19:10)  HR: 62 (16 Jul 2019 12:00) (62 - 85)  BP: 130/76 (16 Jul 2019 12:00) (127/76 - 167/92)  BP(mean): --  RR: 18 (16 Jul 2019 12:00) (18 - 18)  SpO2: 95% (16 Jul 2019 12:00) (95% - 99%)  CAPILLARY BLOOD GLUCOSE        I&O's Summary      PHYSICAL EXAM:  GENERAL: NAD  HEAD:  Atraumatic, Normocephalic  EYES: conjunctiva and sclera clear  NECK: No JVD  CHEST/LUNG: CTA b/l  HEART: S1 S2 RRR  ABDOMEN: +BS Soft, NT/ND  EXTREMITIES:  2+ DP Pulses, No c/c/e  NEUROLOGY: AAOx3, no focal deficits   SKIN: No rashes or lesions    LABS:                    RADIOLOGY & ADDITIONAL TESTS:    Imaging Personally Reviewed:  Consultant(s) Notes Reviewed:    Care Discussed with Consultants/Other Providers: Patient is a 64y old  Female who presents with a chief complaint of Hypertensive urgency (15 Jul 2019 15:44)        SUBJECTIVE / OVERNIGHT EVENTS: c/o blurry vision today - noticed while reading unclear how long she has had it. she does state that she has had the blurry vision event with elevated blood pressures.       MEDICATIONS  (STANDING):  atorvastatin 40 milliGRAM(s) Oral at bedtime  docusate sodium 100 milliGRAM(s) Oral two times a day  escitalopram 10 milliGRAM(s) Oral daily  lisinopril 2.5 milliGRAM(s) Oral daily  traZODone 300 milliGRAM(s) Oral at bedtime    MEDICATIONS  (PRN):  acetaminophen   Tablet .. 650 milliGRAM(s) Oral every 6 hours PRN Mild Pain (1 - 3)      Vital Signs Last 24 Hrs  T(C): 36.9 (16 Jul 2019 12:00), Max: 37.2 (15 Jul 2019 19:10)  T(F): 98.4 (16 Jul 2019 12:00), Max: 98.9 (15 Jul 2019 19:10)  HR: 62 (16 Jul 2019 12:00) (62 - 85)  BP: 130/76 (16 Jul 2019 12:00) (127/76 - 167/92)  BP(mean): --  RR: 18 (16 Jul 2019 12:00) (18 - 18)  SpO2: 95% (16 Jul 2019 12:00) (95% - 99%)  CAPILLARY BLOOD GLUCOSE        I&O's Summary      PHYSICAL EXAM:  GENERAL: NAD  HEAD:  Atraumatic, Normocephalic  EYES: conjunctiva and sclera clear  NECK: No JVD  CHEST/LUNG: CTA b/l  HEART: S1 S2 RRR  ABDOMEN: +BS Soft, NT/ND  EXTREMITIES:  2+ DP Pulses, No c/c/e  NEUROLOGY: AAOx3, PERRL, EOMI, no facial droop, tongue midline, 5/5 MS b/l UE and LE  SKIN: No rashes or lesions    LABS:                    RADIOLOGY & ADDITIONAL TESTS:    Imaging Personally Reviewed:  Consultant(s) Notes Reviewed:    Care Discussed with Consultants/Other Providers: d/w Renal - Dr. Stevens regarding bp management

## 2019-07-17 ENCOUNTER — TRANSCRIPTION ENCOUNTER (OUTPATIENT)
Age: 64
End: 2019-07-17

## 2019-07-17 ENCOUNTER — APPOINTMENT (OUTPATIENT)
Dept: NEPHROLOGY | Facility: CLINIC | Age: 64
End: 2019-07-17

## 2019-07-17 VITALS
DIASTOLIC BLOOD PRESSURE: 82 MMHG | SYSTOLIC BLOOD PRESSURE: 160 MMHG | TEMPERATURE: 98 F | OXYGEN SATURATION: 95 % | RESPIRATION RATE: 18 BRPM | HEART RATE: 60 BPM

## 2019-07-17 PROCEDURE — 86803 HEPATITIS C AB TEST: CPT

## 2019-07-17 PROCEDURE — 93005 ELECTROCARDIOGRAM TRACING: CPT

## 2019-07-17 PROCEDURE — 99285 EMERGENCY DEPT VISIT HI MDM: CPT

## 2019-07-17 PROCEDURE — 80048 BASIC METABOLIC PNL TOTAL CA: CPT

## 2019-07-17 PROCEDURE — 81001 URINALYSIS AUTO W/SCOPE: CPT

## 2019-07-17 PROCEDURE — 84100 ASSAY OF PHOSPHORUS: CPT

## 2019-07-17 PROCEDURE — 80053 COMPREHEN METABOLIC PANEL: CPT

## 2019-07-17 PROCEDURE — 85027 COMPLETE CBC AUTOMATED: CPT

## 2019-07-17 PROCEDURE — 70450 CT HEAD/BRAIN W/O DYE: CPT

## 2019-07-17 PROCEDURE — 83735 ASSAY OF MAGNESIUM: CPT

## 2019-07-17 RX ADMIN — Medication 100 MILLIGRAM(S): at 05:02

## 2019-07-17 RX ADMIN — LISINOPRIL 2.5 MILLIGRAM(S): 2.5 TABLET ORAL at 05:02

## 2019-07-17 NOTE — DISCHARGE NOTE NURSING/CASE MANAGEMENT/SOCIAL WORK - NSDCDPATPORTLINK_GEN_ALL_CORE
You can access the [facility] Patient Portal, offered by [facility], by registering with the following website: [email or web address]

## 2019-07-17 NOTE — CHART NOTE - NSCHARTNOTEFT_GEN_A_CORE
Medicine NP    Patient notified that she is discharged to home however wants to stay overnight because has no pickup until the AM.     Kiarra Petit, [facility]  [id number] Medicine NP    Overnight events: Patient notified last night that she is discharged to home however wants to stay overnight because has no pickup until the AM.     Kiarra Petit, [facility]  [id number]

## 2019-07-18 ENCOUNTER — APPOINTMENT (OUTPATIENT)
Dept: INTERNAL MEDICINE | Facility: CLINIC | Age: 64
End: 2019-07-18
Payer: MEDICARE

## 2019-07-18 ENCOUNTER — APPOINTMENT (OUTPATIENT)
Dept: NEPHROLOGY | Facility: CLINIC | Age: 64
End: 2019-07-18
Payer: SELF-PAY

## 2019-07-18 ENCOUNTER — OUTPATIENT (OUTPATIENT)
Dept: OUTPATIENT SERVICES | Facility: HOSPITAL | Age: 64
LOS: 1 days | End: 2019-07-18
Payer: MEDICARE

## 2019-07-18 VITALS — SYSTOLIC BLOOD PRESSURE: 138 MMHG | DIASTOLIC BLOOD PRESSURE: 64 MMHG

## 2019-07-18 VITALS
SYSTOLIC BLOOD PRESSURE: 114 MMHG | HEIGHT: 63 IN | BODY MASS INDEX: 28 KG/M2 | HEART RATE: 99 BPM | DIASTOLIC BLOOD PRESSURE: 80 MMHG | WEIGHT: 158 LBS

## 2019-07-18 VITALS
SYSTOLIC BLOOD PRESSURE: 157 MMHG | OXYGEN SATURATION: 98 % | HEART RATE: 69 BPM | WEIGHT: 158.73 LBS | BODY MASS INDEX: 28.12 KG/M2 | DIASTOLIC BLOOD PRESSURE: 76 MMHG | HEIGHT: 63 IN

## 2019-07-18 VITALS — HEART RATE: 66 BPM

## 2019-07-18 DIAGNOSIS — I10 ESSENTIAL (PRIMARY) HYPERTENSION: ICD-10-CM

## 2019-07-18 PROCEDURE — 99214 OFFICE O/P EST MOD 30 MIN: CPT

## 2019-07-18 PROCEDURE — 99213 OFFICE O/P EST LOW 20 MIN: CPT | Mod: GE

## 2019-07-18 PROCEDURE — G0463: CPT

## 2019-07-18 RX ORDER — LABETALOL HYDROCHLORIDE 100 MG/1
100 TABLET, FILM COATED ORAL
Qty: 60 | Refills: 3 | Status: DISCONTINUED | COMMUNITY
Start: 2018-07-05 | End: 2019-07-18

## 2019-07-18 RX ORDER — SPIRONOLACTONE 100 MG/1
100 TABLET ORAL DAILY
Qty: 90 | Refills: 3 | Status: DISCONTINUED | COMMUNITY
Start: 2017-11-22 | End: 2019-07-18

## 2019-07-18 RX ORDER — FENOFIBRATE 145 MG/1
145 TABLET, COATED ORAL
Qty: 30 | Refills: 3 | Status: DISCONTINUED | COMMUNITY
Start: 2017-11-22 | End: 2019-07-18

## 2019-07-18 RX ORDER — HYDRALAZINE HYDROCHLORIDE 25 MG/1
25 TABLET ORAL EVERY 8 HOURS
Qty: 270 | Refills: 0 | Status: DISCONTINUED | COMMUNITY
Start: 2018-02-09 | End: 2019-07-18

## 2019-07-18 RX ORDER — CALCIUM CARBONATE 500 MG/1
TABLET, CHEWABLE ORAL
Refills: 0 | Status: DISCONTINUED | COMMUNITY
End: 2019-07-18

## 2019-07-18 RX ORDER — ALBUTEROL SULFATE 90 UG/1
108 (90 BASE) AEROSOL, METERED RESPIRATORY (INHALATION)
Qty: 1 | Refills: 0 | Status: DISCONTINUED | COMMUNITY
Start: 2018-05-11 | End: 2019-07-18

## 2019-07-18 RX ORDER — ANTIARTHRITIC COMBINATION NO.2 900 MG
5000 TABLET ORAL
Refills: 0 | Status: DISCONTINUED | COMMUNITY
Start: 2017-10-18 | End: 2019-07-18

## 2019-07-18 RX ORDER — CLONIDINE HYDROCHLORIDE 0.1 MG/1
0.1 TABLET ORAL 3 TIMES DAILY
Qty: 90 | Refills: 0 | Status: DISCONTINUED | COMMUNITY
Start: 2018-12-19 | End: 2019-07-18

## 2019-07-18 RX ORDER — LEVETIRACETAM 15 MG/ML
INJECTION IONTOPHORESIS
Qty: 180 | Refills: 11 | Status: ACTIVE | COMMUNITY
Start: 2019-07-18 | End: 1900-01-01

## 2019-07-18 RX ORDER — LABETALOL HYDROCHLORIDE 300 MG/1
300 TABLET, FILM COATED ORAL
Qty: 60 | Refills: 6 | Status: DISCONTINUED | COMMUNITY
Start: 2018-01-26 | End: 2019-07-18

## 2019-07-18 RX ORDER — FLUOCINONIDE 0.5 MG/ML
0.05 SOLUTION TOPICAL
Qty: 1 | Refills: 1 | Status: DISCONTINUED | COMMUNITY
Start: 2017-10-20 | End: 2019-07-18

## 2019-07-18 RX ORDER — CHLORTHALIDONE 50 MG/1
50 TABLET ORAL DAILY
Qty: 30 | Refills: 5 | Status: DISCONTINUED | COMMUNITY
Start: 2017-11-22 | End: 2019-07-18

## 2019-07-18 NOTE — ASSESSMENT
[FreeTextEntry1] : Today I had the pleasure of seeing Magdalena Springer who is a 64 years old woman here for evaluation of resistant hypertension.\par \par Resistant Hypertension -- The patient has a strong family history of early onset hypertension and her sisters have had strokes.  Most secondary causes have been ruled out.  She has stopped taking advil and naproxen.  She still has social stressors.  Her blood pressure is now substantially improved on lisinopril 2.5 daily.  She has a very renin-angiotensive sensitive hypertension.  I suspect that her fatigue will get better as she adopts to living at a lower blood pressure.  I will not increase her medication further at this time.  I have asked her to return here next week for a 24 hour ambulatory blood pressure monitor.
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
## 2019-07-18 NOTE — REVIEW OF SYSTEMS
[Fever] : no fever [Chills] : no chills [Feeling Poorly] : feeling poorly [Feeling Tired] : feeling tired [Eyesight Problems] : eyesight problems [Chest Pain] : no chest pain [Lower Ext Edema] : no lower extremity edema [Shortness Of Breath] : no shortness of breath [Wheezing] : no wheezing [Abdominal Pain] : no abdominal pain [Vomiting] : no vomiting [Diarrhea] : diarrhea [Dysuria] : no dysuria [Incontinence] : no incontinence [Joint Swelling] : no joint swelling [Joint Stiffness] : no joint stiffness [Itching] : no itching [Dizziness] : no dizziness [Fainting] : no fainting [Anxiety] : anxiety [Depression] : no depression [Easy Bleeding] : no tendency for easy bleeding [Easy Bruising] : no tendency for easy bruising

## 2019-07-18 NOTE — PHYSICAL EXAM
[General Appearance - Alert] : alert [General Appearance - In No Acute Distress] : in no acute distress [General Appearance - Well Nourished] : well nourished [General Appearance - Well Developed] : well developed [Sclera] : the sclera and conjunctiva were normal [Hearing Threshold Finger Rub Not [place]] : hearing was normal [Examination Of The Oral Cavity] : the lips and gums were normal [Nasal Cavity] : the nasal mucosa and septum were normal [Oropharynx] : the oropharynx was normal [Neck Appearance] : the appearance of the neck was normal [Neck Cervical Mass (___cm)] : no neck mass was observed [Jugular Venous Distention Increased] : there was no jugular-venous distention [Respiration, Rhythm And Depth] : normal respiratory rhythm and effort [Exaggerated Use Of Accessory Muscles For Inspiration] : no accessory muscle use [Auscultation Breath Sounds / Voice Sounds] : lungs were clear to auscultation bilaterally [Heart Sounds] : normal S1 and S2 [Heart Sounds Gallop] : no gallops [Murmurs] : no murmurs [Heart Sounds Pericardial Friction Rub] : no pericardial rub [Edema] : there was no peripheral edema [Bowel Sounds] : normal bowel sounds [Abdomen Soft] : soft [Abdomen Tenderness] : non-tender [] : no hepato-splenomegaly [Cervical Lymph Nodes Enlarged Posterior Bilaterally] : posterior cervical [Cervical Lymph Nodes Enlarged Anterior Bilaterally] : anterior cervical [No CVA Tenderness] : no ~M costovertebral angle tenderness [No Spinal Tenderness] : no spinal tenderness [Abnormal Walk] : normal gait [Oriented To Time, Place, And Person] : oriented to person, place, and time [Impaired Insight] : insight and judgment were intact [Affect] : the affect was normal [Mood] : the mood was normal

## 2019-07-20 NOTE — REVIEW OF SYSTEMS
[Fatigue] : fatigue [Vision Problems] : vision problems [Diarrhea] : diarrhea [Negative] : Heme/Lymph [Fever] : no fever [Chills] : no chills [Pain] : no pain [Chest Pain] : no chest pain [Shortness Of Breath] : no shortness of breath [Abdominal Pain] : no abdominal pain [Nausea] : no nausea [Vomiting] : no vomiting [Melena] : no melena [Dysuria] : no dysuria

## 2019-07-20 NOTE — ASSESSMENT
[FreeTextEntry1] : 64F with resistant HTN, HLD, b/l arithritis, depression/anxiety presenting for post-discharge followup after hospitalization for hypertension, now with blurry vision, urinary incontinence and diarrhea

## 2019-07-20 NOTE — PLAN
[FreeTextEntry1] : HTN\par - c/w lisinopril 2.5mg QD\par - BP WNL today\par - Following with nephrology\par - Return in 2-3 months for BP check\par \par Blurry vision\par - in setting of hypertensive emergency w/ recent hospitalization\par - Optho referral\par - continue to monitor\par \par Diarrhea\par - 1 day history of diarrhea in setting of recent hospitalization\par - if continues, consider sending C diff, stool studies to r/o infection\par \par Urinary incontinence\par - Patient requesting rx for diapers and bed pads - will send rx for now\par - THis is a new symptom for patient x1 week possibly stress incontinence with component of urge incontinence\par - Urology referral

## 2019-07-20 NOTE — HISTORY OF PRESENT ILLNESS
[Post-hospitalization from ___ Hospital] : Post-hospitalization from [unfilled] Hospital [Admitted on: ___] : The patient was admitted on [unfilled] [Discharged on ___] : discharged on [unfilled] [FreeTextEntry2] : 64F with resistant HTN, HLD, b/l arithritis, depression/anxiety. Patient currently undergoing workup for persistent hypertension with nephrology. Patient recently discharged from hospital for hypertension - she had her BP meds changed and was maintained on lisinopril 2.5 all other BP meds have been stopped. Patient was seen in nephrology office today 7/18 prior to visit. \par Of note, patient reporting 10-day blurry vision after having very high SBP (>230) on day of hospitalization. She continues to report blurred vision, difficulty reading. No eye discharge, pain, no trauma to eye. patient reports having blurred vision when her BP has been high in the past. Patient also reporting 1 day of diarrhea x3 episodes total). NO fever, chills, n/v. No chest pain or SOB. \par \par patient also reporting urinary incontinence x1 week since being hospitalized. She reports intermittent incontinence in past when coughing or laughing however, now has daily incontinence - cannot make it to bathroom once she has the urge. Patient is now wearing diapers and using bed pads to help. No dysuria, hematuria. No abdominal pain.

## 2019-07-22 ENCOUNTER — APPOINTMENT (OUTPATIENT)
Dept: NEPHROLOGY | Facility: CLINIC | Age: 64
End: 2019-07-22
Payer: MEDICARE

## 2019-07-22 DIAGNOSIS — R03.0 ELEVATED BLOOD-PRESSURE READING, W/OUT DIAGNOSIS OF HYPERTENSION: ICD-10-CM

## 2019-07-22 PROCEDURE — 93784 AMBL BP MNTR W/SOFTWARE: CPT | Mod: GA

## 2019-07-24 DIAGNOSIS — H53.8 OTHER VISUAL DISTURBANCES: ICD-10-CM

## 2019-07-24 DIAGNOSIS — N39.46 MIXED INCONTINENCE: ICD-10-CM

## 2019-07-24 DIAGNOSIS — R32 UNSPECIFIED URINARY INCONTINENCE: ICD-10-CM

## 2019-07-31 ENCOUNTER — MEDICATION RENEWAL (OUTPATIENT)
Age: 64
End: 2019-07-31

## 2019-08-30 NOTE — ED PROVIDER NOTE - NSCAREINITIATED _GEN_ER
Inna Lam(PA) Solaraze Counseling:  I discussed with the patient the risks of Solaraze including but not limited to erythema, scaling, itching, weeping, crusting, and pain.

## 2019-09-04 ENCOUNTER — MEDICATION RENEWAL (OUTPATIENT)
Age: 64
End: 2019-09-04

## 2019-09-04 RX ORDER — LISINOPRIL 2.5 MG/1
2.5 TABLET ORAL
Qty: 30 | Refills: 0 | Status: DISCONTINUED | COMMUNITY
Start: 2019-07-18 | End: 2019-09-04

## 2019-09-18 ENCOUNTER — RX RENEWAL (OUTPATIENT)
Age: 64
End: 2019-09-18
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
## 2019-10-24 ENCOUNTER — APPOINTMENT (OUTPATIENT)
Dept: NEUROLOGY | Facility: HOSPITAL | Age: 64
End: 2019-10-24

## 2019-11-04 ENCOUNTER — OUTPATIENT (OUTPATIENT)
Dept: OUTPATIENT SERVICES | Facility: HOSPITAL | Age: 64
LOS: 1 days | End: 2019-11-04
Payer: MEDICARE

## 2019-11-04 ENCOUNTER — APPOINTMENT (OUTPATIENT)
Dept: INTERNAL MEDICINE | Facility: CLINIC | Age: 64
End: 2019-11-04
Payer: MEDICARE

## 2019-11-04 VITALS
BODY MASS INDEX: 27.11 KG/M2 | HEIGHT: 63 IN | OXYGEN SATURATION: 95 % | WEIGHT: 153 LBS | HEART RATE: 75 BPM | DIASTOLIC BLOOD PRESSURE: 80 MMHG | SYSTOLIC BLOOD PRESSURE: 168 MMHG

## 2019-11-04 DIAGNOSIS — I10 ESSENTIAL (PRIMARY) HYPERTENSION: ICD-10-CM

## 2019-11-04 PROCEDURE — 99213 OFFICE O/P EST LOW 20 MIN: CPT | Mod: GE

## 2019-11-04 RX ORDER — ESCITALOPRAM OXALATE 10 MG/1
10 TABLET ORAL
Refills: 0 | Status: DISCONTINUED | COMMUNITY
End: 2019-11-04

## 2019-11-04 NOTE — REVIEW OF SYSTEMS
[Joint Pain] : joint pain [Fever] : no fever [Chills] : no chills [Pain] : no pain [Vision Problems] : no vision problems [Hoarseness] : no hoarseness [Sore Throat] : no sore throat [Chest Pain] : no chest pain [Orthopnea] : no orthopnea [Paroxysmal Nocturnal Dyspnea] : no paroxysmal nocturnal dyspnea [Shortness Of Breath] : no shortness of breath [Wheezing] : no wheezing [Cough] : no cough [Dyspnea on Exertion] : no dyspnea on exertion [Abdominal Pain] : no abdominal pain [Nausea] : no nausea [Constipation] : no constipation [Diarrhea] : diarrhea [Vomiting] : no vomiting [Heartburn] : no heartburn [Melena] : no melena [Dysuria] : no dysuria [Hematuria] : no hematuria [Headache] : no headache

## 2019-11-04 NOTE — PHYSICAL EXAM
[No Acute Distress] : no acute distress [Well Developed] : well developed [Normal Sclera/Conjunctiva] : normal sclera/conjunctiva [EOMI] : extraocular movements intact [No Respiratory Distress] : no respiratory distress  [No Accessory Muscle Use] : no accessory muscle use [Clear to Auscultation] : lungs were clear to auscultation bilaterally [Normal Rate] : normal rate  [Regular Rhythm] : with a regular rhythm [Normal S1, S2] : normal S1 and S2 [No Murmur] : no murmur heard [No Edema] : there was no peripheral edema [Soft] : abdomen soft [Non Tender] : non-tender [Non-distended] : non-distended [Normal Affect] : the affect was normal

## 2019-11-04 NOTE — PLAN
[FreeTextEntry1] : 63 yr-old F with resistant HTN, HLD, b/l knee arithritis, depression/anxiety presenting for DMV form to be filled out and follow up\par \par # DMV form\par - filled form out and gave to pt \par \par # HTN\par - /80; switched from lisinopril 10 to losartan 50 b/c of cough; c/w 12.5 HCTZ\par - pt has a f/u w/ nephrology 11/14; can have BP check then.  Pt also has a BP machine at home; advised her to call if her BP is too high or too low \par \par # HCM\par - got flu shot at [facility]\par - RTC for CPE and routine bloodwork\par \par Rebeca Tracey MD\par Internal Medicine, PGY-2

## 2019-11-14 ENCOUNTER — APPOINTMENT (OUTPATIENT)
Dept: NEPHROLOGY | Facility: CLINIC | Age: 64
End: 2019-11-14
Payer: MEDICARE

## 2019-11-14 VITALS
OXYGEN SATURATION: 99 % | WEIGHT: 155.42 LBS | BODY MASS INDEX: 27.54 KG/M2 | DIASTOLIC BLOOD PRESSURE: 89 MMHG | SYSTOLIC BLOOD PRESSURE: 190 MMHG | HEIGHT: 63 IN | HEART RATE: 67 BPM

## 2019-11-14 VITALS — SYSTOLIC BLOOD PRESSURE: 178 MMHG | DIASTOLIC BLOOD PRESSURE: 80 MMHG

## 2019-11-14 PROCEDURE — 99214 OFFICE O/P EST MOD 30 MIN: CPT

## 2019-11-14 NOTE — PHYSICAL EXAM
[General Appearance - Alert] : alert [General Appearance - In No Acute Distress] : in no acute distress [Sclera] : the sclera and conjunctiva were normal [General Appearance - Well Nourished] : well nourished [General Appearance - Well Developed] : well developed [Hearing Threshold Finger Rub Not [place]] : hearing was normal [Examination Of The Oral Cavity] : the lips and gums were normal [Oropharynx] : the oropharynx was normal [Nasal Cavity] : the nasal mucosa and septum were normal [Neck Appearance] : the appearance of the neck was normal [Jugular Venous Distention Increased] : there was no jugular-venous distention [Neck Cervical Mass (___cm)] : no neck mass was observed [Exaggerated Use Of Accessory Muscles For Inspiration] : no accessory muscle use [Respiration, Rhythm And Depth] : normal respiratory rhythm and effort [Auscultation Breath Sounds / Voice Sounds] : lungs were clear to auscultation bilaterally [Heart Sounds Gallop] : no gallops [Heart Sounds] : normal S1 and S2 [Heart Sounds Pericardial Friction Rub] : no pericardial rub [Murmurs] : no murmurs [Edema] : there was no peripheral edema [Abdomen Soft] : soft [Bowel Sounds] : normal bowel sounds [Abdomen Tenderness] : non-tender [] : no hepato-splenomegaly [Cervical Lymph Nodes Enlarged Posterior Bilaterally] : posterior cervical [Cervical Lymph Nodes Enlarged Anterior Bilaterally] : anterior cervical [No CVA Tenderness] : no ~M costovertebral angle tenderness [No Spinal Tenderness] : no spinal tenderness [Abnormal Walk] : normal gait [Oriented To Time, Place, And Person] : oriented to person, place, and time [Affect] : the affect was normal [Impaired Insight] : insight and judgment were intact [Mood] : the mood was normal

## 2019-11-14 NOTE — REVIEW OF SYSTEMS
[Chills] : no chills [Fever] : no fever [Feeling Poorly] : not feeling poorly [Feeling Tired] : not feeling tired [Eyesight Problems] : eyesight problems [Lower Ext Edema] : no lower extremity edema [Chest Pain] : no chest pain [Wheezing] : no wheezing [Shortness Of Breath] : no shortness of breath [Abdominal Pain] : no abdominal pain [Dysuria] : no dysuria [Diarrhea] : no diarrhea [Vomiting] : no vomiting [Incontinence] : no incontinence [Joint Swelling] : no joint swelling [Itching] : no itching [Dizziness] : no dizziness [Joint Stiffness] : no joint stiffness [Fainting] : no fainting [Anxiety] : no anxiety [Depression] : no depression [Easy Bleeding] : no tendency for easy bleeding [Easy Bruising] : no tendency for easy bruising

## 2019-11-14 NOTE — ASSESSMENT
[FreeTextEntry1] : Today I had the pleasure of seeing Magdalena Springer who is a 64 years old woman here for evaluation of resistant hypertension.\par \par Resistant Hypertension -- The patient has a strong family history of early onset hypertension and her sisters have had strokes.  Most secondary causes have been ruled out.  She has stopped taking advil and naproxen.  She still has social stressors.  Her blood pressure is now substantially improved on lisinopril-HCTZ 10-12.5 daily.  She has a very renin-angiotensive sensitive hypertension.  She does poorly without a combination pill to enhance adherence.  Therefore, I have asked her not to take losartan and HCTZ.  Instead, I have switched her to valsartan-hctz 80-12.5.  She will call me next week with her blood pressure readings.  If elevated above 120/80 will increase medications accordingly.\par \par f/u in three months.
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
## 2019-11-15 ENCOUNTER — MEDICATION RENEWAL (OUTPATIENT)
Age: 64
End: 2019-11-15

## 2019-11-15 LAB
ALBUMIN SERPL ELPH-MCNC: 4.3 G/DL
ANION GAP SERPL CALC-SCNC: 14 MMOL/L
APPEARANCE: CLEAR
BACTERIA: NEGATIVE
BASOPHILS # BLD AUTO: 0.04 K/UL
BASOPHILS NFR BLD AUTO: 0.5 %
BILIRUBIN URINE: NEGATIVE
BLOOD URINE: NEGATIVE
BUN SERPL-MCNC: 19 MG/DL
CALCIUM SERPL-MCNC: 9.9 MG/DL
CALCIUM SERPL-MCNC: 9.9 MG/DL
CHLORIDE SERPL-SCNC: 99 MMOL/L
CHOLEST SERPL-MCNC: 269 MG/DL
CHOLEST/HDLC SERPL: 5.7 RATIO
CO2 SERPL-SCNC: 28 MMOL/L
COLOR: COLORLESS
CREAT SERPL-MCNC: 1.03 MG/DL
CREAT SPEC-SCNC: 18 MG/DL
CREAT/PROT UR: 0.3 RATIO
EOSINOPHIL # BLD AUTO: 0.34 K/UL
EOSINOPHIL NFR BLD AUTO: 3.9 %
ESTIMATED AVERAGE GLUCOSE: 105 MG/DL
FERRITIN SERPL-MCNC: 123 NG/ML
GLUCOSE QUALITATIVE U: NEGATIVE
GLUCOSE SERPL-MCNC: 82 MG/DL
HBA1C MFR BLD HPLC: 5.3 %
HCT VFR BLD CALC: 39.7 %
HDLC SERPL-MCNC: 47 MG/DL
HGB BLD-MCNC: 12.9 G/DL
HYALINE CASTS: 0 /LPF
IMM GRANULOCYTES NFR BLD AUTO: 0.2 %
IRON SATN MFR SERPL: 26 %
IRON SERPL-MCNC: 88 UG/DL
KETONES URINE: NEGATIVE
LDLC SERPL CALC-MCNC: 160 MG/DL
LEUKOCYTE ESTERASE URINE: NEGATIVE
LYMPHOCYTES # BLD AUTO: 2.51 K/UL
LYMPHOCYTES NFR BLD AUTO: 29.2 %
MAN DIFF?: NORMAL
MCHC RBC-ENTMCNC: 28.7 PG
MCHC RBC-ENTMCNC: 32.5 GM/DL
MCV RBC AUTO: 88.2 FL
MICROSCOPIC-UA: NORMAL
MONOCYTES # BLD AUTO: 0.7 K/UL
MONOCYTES NFR BLD AUTO: 8.1 %
NEUTROPHILS # BLD AUTO: 5 K/UL
NEUTROPHILS NFR BLD AUTO: 58.1 %
NITRITE URINE: NEGATIVE
PARATHYROID HORMONE INTACT: 34 PG/ML
PH URINE: 6
PHOSPHATE SERPL-MCNC: 3.9 MG/DL
PLATELET # BLD AUTO: 260 K/UL
POTASSIUM SERPL-SCNC: 4.2 MMOL/L
PROT UR-MCNC: 6 MG/DL
PROTEIN URINE: NEGATIVE
RBC # BLD: 4.5 M/UL
RBC # FLD: 13.4 %
RED BLOOD CELLS URINE: 1 /HPF
SODIUM SERPL-SCNC: 141 MMOL/L
SPECIFIC GRAVITY URINE: 1.01
SQUAMOUS EPITHELIAL CELLS: 1 /HPF
TIBC SERPL-MCNC: 344 UG/DL
TRIGL SERPL-MCNC: 311 MG/DL
UIBC SERPL-MCNC: 256 UG/DL
URATE SERPL-MCNC: 6 MG/DL
UROBILINOGEN URINE: NORMAL
WBC # FLD AUTO: 8.61 K/UL
WHITE BLOOD CELLS URINE: 0 /HPF

## 2019-11-18 ENCOUNTER — LABORATORY RESULT (OUTPATIENT)
Age: 64
End: 2019-11-18

## 2019-11-18 PROCEDURE — 82274 ASSAY TEST FOR BLOOD FECAL: CPT

## 2019-11-18 PROCEDURE — G0463: CPT

## 2019-11-25 LAB — OB PNL STL IA: NEGATIVE — SIGNIFICANT CHANGE UP

## 2019-12-02 ENCOUNTER — OUTPATIENT (OUTPATIENT)
Dept: OUTPATIENT SERVICES | Facility: HOSPITAL | Age: 64
LOS: 1 days | End: 2019-12-02
Payer: MEDICARE

## 2019-12-02 ENCOUNTER — APPOINTMENT (OUTPATIENT)
Dept: INTERNAL MEDICINE | Facility: CLINIC | Age: 64
End: 2019-12-02
Payer: MEDICARE

## 2019-12-02 VITALS
DIASTOLIC BLOOD PRESSURE: 70 MMHG | WEIGHT: 153 LBS | SYSTOLIC BLOOD PRESSURE: 144 MMHG | HEIGHT: 63 IN | BODY MASS INDEX: 27.11 KG/M2

## 2019-12-02 DIAGNOSIS — I10 ESSENTIAL (PRIMARY) HYPERTENSION: ICD-10-CM

## 2019-12-02 PROCEDURE — G0463: CPT

## 2019-12-02 PROCEDURE — 99214 OFFICE O/P EST MOD 30 MIN: CPT | Mod: GC

## 2019-12-02 RX ORDER — HYDROCHLOROTHIAZIDE 12.5 MG/1
12.5 CAPSULE ORAL DAILY
Qty: 30 | Refills: 3 | Status: DISCONTINUED | COMMUNITY
Start: 2019-11-04 | End: 2019-12-02

## 2019-12-02 RX ORDER — LOSARTAN POTASSIUM 50 MG/1
50 TABLET, FILM COATED ORAL DAILY
Qty: 30 | Refills: 3 | Status: DISCONTINUED | COMMUNITY
Start: 2019-11-04 | End: 2019-12-02

## 2019-12-02 NOTE — PHYSICAL EXAM
[No Acute Distress] : no acute distress [Well Nourished] : well nourished [Well Developed] : well developed [Well-Appearing] : well-appearing [Normal Sclera/Conjunctiva] : normal sclera/conjunctiva [EOMI] : extraocular movements intact [Normal Outer Ear/Nose] : the outer ears and nose were normal in appearance [No JVD] : no jugular venous distention [No Respiratory Distress] : no respiratory distress  [Normal Rate] : normal rate  [No Accessory Muscle Use] : no accessory muscle use [Regular Rhythm] : with a regular rhythm [Normal S1, S2] : normal S1 and S2 [No Murmur] : no murmur heard [Soft] : abdomen soft [No Edema] : there was no peripheral edema [Non-distended] : non-distended [Non Tender] : non-tender [Normal Bowel Sounds] : normal bowel sounds [No Masses] : no abdominal mass palpated [No Rash] : no rash [Normal Affect] : the affect was normal [Normal Mood] : the mood was normal [Normal Insight/Judgement] : insight and judgment were intact [de-identified] : right arm nontender to palpation, mild swelling observed above elbow medially, ROM preserved, sensation intact

## 2019-12-02 NOTE — ASSESSMENT
[FreeTextEntry1] : 63yo F with PMH resistant HTN, HLD, b/l knee arthritis, depression/anxiety, presents for follow up of elevated lipids as rec'd by Dr. Shepherd (nephrology). Fasting lipid panel on 11/15/19 indicates triglycerides 311, cholesterol 269, HDL 47, . \par \par #HLD - cholesterol is controlled well with current statin dose, triglyceridemia is not in pancreatitis range\par - lifestyle changes: increase aerobic exercise such as swimming\par - c/w atorvastatin 80mg daily\par - lipid panel at next annual visit\par \par #R arm pain - intermittent, mild pain, no loss in sensations, pain is not radicular, nerve impingement unlikely, fracture unlikely\par - rtc if pain worsens or becomes constant\par \par #HTN - follows with Dr. Shepherd, nephrology\par - c/w valsartan/hctz\par \par #HCM\par - flu immunization received at [facility] this year

## 2019-12-02 NOTE — HISTORY OF PRESENT ILLNESS
[FreeTextEntry1] : f/u elevated cholesterol as rec'd by nephrology [de-identified] : 65yo F with PMH resistant HTN, HLD, b/l knee arthritis, depression/anxiety, presents for follow up of elevated lipids as rec'd by Dr. Shepherd (nephrology). Fasting lipid panel on 11/15/19 indicates triglycerides 311, cholesterol 269, HDL 47, .\par \par Today, patient presents feeling well. She currently takes atorvastatin 80mg daily for HLD and reports good medication adherence. Her diet consists of large quantities of rice as well as a mix of meats and vegetables. Previously, before her depression was treated, she was active, doing hot yoga. Now, she has not been exercising regularly due to pain in her knees. She has been retired for 6 years and is relatively sedentary.\par \par #hypertension\par She had a hospitalization in July for hypertension, systolic >230s. She has been following with nephrology and recently started valsartan-hctz combination therapy 1.5 weeks ago with good tolerance. She denies any CP, palpitations, dyspnea, dizziness, lightheadedness.\par \par #R arm pain\par For the last 4 days, she has had intermittent numbness and tingling that starts in the bicep and extends to the forearm. Fingers and wrists not affected. No restrictions in movement of the elbow or shoulder. Denies any numbness/tingling today.

## 2019-12-04 DIAGNOSIS — E78.5 HYPERLIPIDEMIA, UNSPECIFIED: ICD-10-CM

## 2019-12-18 NOTE — ED CDU PROVIDER NOTE - CROS ED ROS STATEMENT
Cryosurgery (Freezing) Wound Care Instructions    AFTER THE PROCEDURE:    You will notice swelling and redness around the site. This is normal.    You may experience a sharp or sore feeling for the next several days. For this discomfort, you may take acetaminophen (Tylenol©).  A blister may develop at the treated area, sometimes as soon as by the end of the day. After several days, the blister will subside and a scab will form.  If the area is bumped or traumatized during the first few days following freezing, you may develop bleeding into the blister, forming a blood blister. This is nothing to be alarmed about.  If the blister is tense, uncomfortable, or much larger than the site that was frozen, you may pop the blister along its edge with a sterile needle (boiled, heated under a flame, or cleaned with alcohol) to allow the fluid to drain out. If the blister does not bother you, no treatment is needed.  Do NOT peel off the top of the blister roof. It will act as a dressing on top of your wound. WOUND CARE:    You may shower or bathe as usual, but avoid scrubbing the areas that have been frozen.  Cleanse the site twice a day with mild soapy water, and then apply a thin film of white petrolatum (Vaseline©).  You do not need to cover the area, but can if you prefer.  Do NOT allow the site to become dry or crusted, or attempt to dry it out with rubbing alcohol or hydrogen peroxide.  Continue this regimen until the area is pink and healed. Depending on the size and location of your cryosurgery site, healing may take 2 to 4 weeks.  The area may continue to be pink for several weeks, and over the next few months may become darker or lighter than the surrounding skin. This may be a permanent change. Statement Selected all other ROS negative except as per HPI

## 2020-01-15 ENCOUNTER — APPOINTMENT (OUTPATIENT)
Dept: INTERNAL MEDICINE | Facility: CLINIC | Age: 65
End: 2020-01-15

## 2020-01-21 ENCOUNTER — RX RENEWAL (OUTPATIENT)
Age: 65
End: 2020-01-21

## 2020-02-13 NOTE — ED ADULT TRIAGE NOTE - BP NONINVASIVE DIASTOLIC (MM HG)
Reviewed patient's blood pressures -- she is having BPs in the 120s-170s/70s-80s. HRs are in the 50s.     Will decrease Toprol XL to 25mg daily for bradycardia.     Start Norvasc 2.5mg daily. Will titrate for BP < 140/90.     May consider renal artery US as well.     Do not feel her headache, nausea, and weakness are related to her blood pressure. Recommend follow up with her PCP.   
107

## 2020-03-23 ENCOUNTER — APPOINTMENT (OUTPATIENT)
Dept: NEPHROLOGY | Facility: CLINIC | Age: 65
End: 2020-03-23

## 2020-04-14 ENCOUNTER — RX RENEWAL (OUTPATIENT)
Age: 65
End: 2020-04-14

## 2020-08-05 ENCOUNTER — RX RENEWAL (OUTPATIENT)
Age: 65
End: 2020-08-05

## 2020-08-10 LAB — HEMOCCULT STL QL IA: NEGATIVE

## 2020-09-08 ENCOUNTER — APPOINTMENT (OUTPATIENT)
Dept: INTERNAL MEDICINE | Facility: CLINIC | Age: 65
End: 2020-09-08
Payer: MEDICARE

## 2020-09-08 ENCOUNTER — OUTPATIENT (OUTPATIENT)
Dept: OUTPATIENT SERVICES | Facility: HOSPITAL | Age: 65
LOS: 1 days | End: 2020-09-08
Payer: MEDICARE

## 2020-09-08 VITALS
DIASTOLIC BLOOD PRESSURE: 80 MMHG | HEIGHT: 63 IN | WEIGHT: 160 LBS | SYSTOLIC BLOOD PRESSURE: 132 MMHG | BODY MASS INDEX: 28.35 KG/M2

## 2020-09-08 DIAGNOSIS — L20.9 ATOPIC DERMATITIS, UNSPECIFIED: ICD-10-CM

## 2020-09-08 DIAGNOSIS — I10 ESSENTIAL (PRIMARY) HYPERTENSION: ICD-10-CM

## 2020-09-08 DIAGNOSIS — N39.46 MIXED INCONTINENCE: ICD-10-CM

## 2020-09-08 DIAGNOSIS — R59.0 LOCALIZED ENLARGED LYMPH NODES: ICD-10-CM

## 2020-09-08 DIAGNOSIS — H53.8 OTHER VISUAL DISTURBANCES: ICD-10-CM

## 2020-09-08 PROCEDURE — G0438: CPT

## 2020-09-09 ENCOUNTER — LABORATORY RESULT (OUTPATIENT)
Age: 65
End: 2020-09-09

## 2020-09-09 PROBLEM — N39.46 URINARY INCONTINENCE, MIXED: Status: ACTIVE | Noted: 2019-07-18

## 2020-09-09 PROBLEM — R59.0 LYMPHADENOPATHY, AXILLARY: Status: ACTIVE | Noted: 2020-09-09

## 2020-09-09 PROBLEM — H53.8 BLURRED VISION: Status: ACTIVE | Noted: 2019-07-18

## 2020-09-09 PROBLEM — L20.9 ATOPIC DERMATITIS: Status: ACTIVE | Noted: 2017-10-20

## 2020-09-09 PROCEDURE — 83036 HEMOGLOBIN GLYCOSYLATED A1C: CPT

## 2020-09-09 PROCEDURE — 80053 COMPREHEN METABOLIC PANEL: CPT

## 2020-09-09 PROCEDURE — G0439: CPT

## 2020-09-09 PROCEDURE — 85027 COMPLETE CBC AUTOMATED: CPT

## 2020-09-09 PROCEDURE — 80061 LIPID PANEL: CPT

## 2020-09-09 NOTE — ASSESSMENT
[FreeTextEntry1] : Plan as per above; additionally:\par \par Needs flu and Pneumovax, but deferred this visit; will obtain next visit\par \par Routine DEXA scan ordered\par \par Referral to OB/GYN for age-appropriate screening\par \par Return to clinic in 5-10 weeks

## 2020-09-09 NOTE — HEALTH RISK ASSESSMENT
[Yes] : Yes [Never (0 pts)] : Never (0 points) [1 or 2 (0 pts)] : 1 or 2 (0 points) [No] : In the past 12 months have you used drugs other than those required for medical reasons? No [No falls in past year] : Patient reported no falls in the past year [1] : 2) Feeling down, depressed, or hopeless for several days (1) [With Family] : lives with family [Patient declined colonoscopy] : Patient declined colonoscopy [Retired] : retired [Feels Safe at Home] : Feels safe at home [] :  [Fully functional (bathing, dressing, toileting, transferring, walking, feeding)] : Fully functional (bathing, dressing, toileting, transferring, walking, feeding) [Fully functional (using the telephone, shopping, preparing meals, housekeeping, doing laundry, using] : Fully functional and needs no help or supervision to perform IADLs (using the telephone, shopping, preparing meals, housekeeping, doing laundry, using transportation, managing medications and managing finances) [Reports changes in vision] : Reports changes in vision [Reports changes in dental health] : Reports changes in dental health [] : No [de-identified] : walking as tolerated, hot yoga [de-identified] : Cristo/Ukrainian food, home cooking, restaurants occasionally, not much snacking [[id number]] : 2 [ColonoscopyComments] : does FIT testing annually [Change in mental status noted] : No change in mental status noted [de-identified] : recent root canal [de-identified] : blurry vision when reading

## 2020-09-09 NOTE — HISTORY OF PRESENT ILLNESS
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 She presents today to clinic for CPE She has had resistant hypertension and around the time she lost her son to a espinal with AIDS. At the time she was diagnosed with hypertension she was under tremendous stress. She previously has had multiple recurrent hospitalizations for uncontrolled hypertension and was previously on a calcium channel blocker, an ARB, hydralazine, labetalol, chlorthalidone, and spironolactone. She then saw Dr. Shepherd with nephrology who greatly simplified her regimen to valsartan-HCTZ, and also the patient started addressing her underlying psychiatric issues relating primarily to guilt, depression, and anger. Secondary work up has revealed low giacomo renin, negative plasma metanephrines, and no NICOLLE. When asked about her diet she laughs and says, "my  is [place] and I'm from the [facility] so its bad., but she has been making great strides to eliminate salt from her diet. She used to have symptoms of LE edema, AVALOS, headaches, and blurry vision a/w high BP, but now she had none of those, and she feels significantly better. Previously her high BP limited mobility, now it does not. She checks her BP at home and it now ranges under 140 SBP.\par \par Family history notable for 10 siblings all of whom have high BP, a mother with high BP, and 2 siblings have had strokes which were said to be complications of their uncontrolled hypertension.\par \par Regarding depression and mood, patient previously was on many antidepressants and mood stabilizers, and was taking benzos to get her through episodes quite regularly. Now only takes a Xanax very seldomly, only when she flies. No more episodes of poor sleep, sleeps very well. Previously had SI which she never gets now. Follow with psychiatry, psychology, and anger management every 2 weeks, currently on trazodone only  150 mg qHS which was recently dose-reduced from 300 mg as she is doing well\par \par Most bothersome thing for her now is chronic knee pain 2/2 chronic OA, which is limiting her mobility. She was previously recommended knee replacement, but was limited in options due to uncontrolled BP and depression. Now has been thinking about addressing it.\par \par Recently had root canal, currently completed a course of post-op penicillin for prophylaxis without issue. Tooth pain much better, was taking Motrin, now will switch to Tylenol.\par \par Denies fever, chills, SOB, CP, headache. Weight stable 150-160 lbs\par \par Feels safe at home.

## 2020-09-09 NOTE — PHYSICAL EXAM
[Normal Sclera/Conjunctiva] : normal sclera/conjunctiva [Well Nourished] : well nourished [No Acute Distress] : no acute distress [PERRL] : pupils equal round and reactive to light [EOMI] : extraocular movements intact [Normal Outer Ear/Nose] : the outer ears and nose were normal in appearance [Normal TMs] : both tympanic membranes were normal [Normal Oropharynx] : the oropharynx was normal [Supple] : supple [Thyroid Normal, No Nodules] : the thyroid was normal and there were no nodules present [No Lymphadenopathy] : no lymphadenopathy [No JVD] : no jugular venous distention [No Accessory Muscle Use] : no accessory muscle use [Clear to Auscultation] : lungs were clear to auscultation bilaterally [No Respiratory Distress] : no respiratory distress  [Normal S1, S2] : normal S1 and S2 [Regular Rhythm] : with a regular rhythm [Normal Rate] : normal rate  [No Murmur] : no murmur heard [No Edema] : there was no peripheral edema [No Axillary Lymphadenopathy] : no axillary lymphadenopathy [Normal Supraclavicular Nodes] : no supraclavicular lymphadenopathy [Normal Axillary Nodes] : no axillary lymphadenopathy [Normal Posterior Cervical Nodes] : no posterior cervical lymphadenopathy [Normal Anterior Cervical Nodes] : no anterior cervical lymphadenopathy [No CVA Tenderness] : no CVA  tenderness [No Spinal Tenderness] : no spinal tenderness [No Joint Swelling] : no joint swelling [No Focal Deficits] : no focal deficits [Coordination Grossly Intact] : coordination grossly intact [Grossly Normal Strength/Tone] : grossly normal strength/tone [Normal Affect] : the affect was normal [Alert and Oriented x3] : oriented to person, place, and time [Normal Gait] : normal gait [Normal Insight/Judgement] : insight and judgment were intact

## 2020-09-09 NOTE — REVIEW OF SYSTEMS
[Vision Problems] : vision problems [Skin Rash] : skin rash [Joint Pain] : joint pain [Depression] : depression [Fever] : no fever [Earache] : no earache [Chest Pain] : no chest pain [Palpitations] : no palpitations [Paroxysmal Nocturnal Dyspnea] : no paroxysmal nocturnal dyspnea [Orthopnea] : no orthopnea [Shortness Of Breath] : no shortness of breath [Wheezing] : no wheezing [Dyspnea on Exertion] : no dyspnea on exertion [Cough] : no cough [Nausea] : no nausea [Abdominal Pain] : no abdominal pain [Constipation] : no constipation [Vomiting] : no vomiting [Diarrhea] : diarrhea [Heartburn] : no heartburn [Dysuria] : no dysuria [Melena] : no melena [Headache] : no headache [Suicidal] : not suicidal [Insomnia] : no insomnia [Anxiety] : no anxiety

## 2020-09-10 LAB
A1C WITH ESTIMATED AVERAGE GLUCOSE RESULT: 5.6 % — SIGNIFICANT CHANGE UP (ref 4–5.6)
ALBUMIN SERPL ELPH-MCNC: 4.2 G/DL — SIGNIFICANT CHANGE UP (ref 3.3–5)
ALP SERPL-CCNC: 95 U/L — SIGNIFICANT CHANGE UP (ref 40–120)
ALT FLD-CCNC: 11 U/L — SIGNIFICANT CHANGE UP (ref 10–45)
ANION GAP SERPL CALC-SCNC: 13 MMOL/L — SIGNIFICANT CHANGE UP (ref 5–17)
AST SERPL-CCNC: 16 U/L — SIGNIFICANT CHANGE UP (ref 10–40)
BILIRUB SERPL-MCNC: 0.4 MG/DL — SIGNIFICANT CHANGE UP (ref 0.2–1.2)
BUN SERPL-MCNC: 16 MG/DL — SIGNIFICANT CHANGE UP (ref 7–23)
CALCIUM SERPL-MCNC: 9.7 MG/DL — SIGNIFICANT CHANGE UP (ref 8.4–10.5)
CHLORIDE SERPL-SCNC: 101 MMOL/L — SIGNIFICANT CHANGE UP (ref 96–108)
CHOLEST SERPL-MCNC: 244 MG/DL — HIGH (ref 10–199)
CO2 SERPL-SCNC: 29 MMOL/L — SIGNIFICANT CHANGE UP (ref 22–31)
CREAT SERPL-MCNC: 1.13 MG/DL — SIGNIFICANT CHANGE UP (ref 0.5–1.3)
ESTIMATED AVERAGE GLUCOSE: 114 MG/DL — SIGNIFICANT CHANGE UP (ref 68–114)
GLUCOSE SERPL-MCNC: 86 MG/DL — SIGNIFICANT CHANGE UP (ref 70–99)
HCT VFR BLD CALC: 40.9 % — SIGNIFICANT CHANGE UP (ref 34.5–45)
HDLC SERPL-MCNC: 41 MG/DL — LOW
HGB BLD-MCNC: 12.5 G/DL — SIGNIFICANT CHANGE UP (ref 11.5–15.5)
LIPID PNL WITH DIRECT LDL SERPL: 126 MG/DL — HIGH
MCHC RBC-ENTMCNC: 27.8 PG — SIGNIFICANT CHANGE UP (ref 27–34)
MCHC RBC-ENTMCNC: 30.6 GM/DL — LOW (ref 32–36)
MCV RBC AUTO: 91.1 FL — SIGNIFICANT CHANGE UP (ref 80–100)
PLATELET # BLD AUTO: 260 K/UL — SIGNIFICANT CHANGE UP (ref 150–400)
POTASSIUM SERPL-MCNC: 4.3 MMOL/L — SIGNIFICANT CHANGE UP (ref 3.5–5.3)
POTASSIUM SERPL-SCNC: 4.3 MMOL/L — SIGNIFICANT CHANGE UP (ref 3.5–5.3)
PROT SERPL-MCNC: 7.1 G/DL — SIGNIFICANT CHANGE UP (ref 6–8.3)
RBC # BLD: 4.49 M/UL — SIGNIFICANT CHANGE UP (ref 3.8–5.2)
RBC # FLD: 13.4 % — SIGNIFICANT CHANGE UP (ref 10.3–14.5)
SODIUM SERPL-SCNC: 143 MMOL/L — SIGNIFICANT CHANGE UP (ref 135–145)
TOTAL CHOLESTEROL/HDL RATIO MEASUREMENT: 6 RATIO — SIGNIFICANT CHANGE UP (ref 3.3–7.1)
TRIGL SERPL-MCNC: 390 MG/DL — HIGH (ref 10–149)
WBC # BLD: 10.26 K/UL — SIGNIFICANT CHANGE UP (ref 3.8–10.5)
WBC # FLD AUTO: 10.26 K/UL — SIGNIFICANT CHANGE UP (ref 3.8–10.5)

## 2020-09-18 DIAGNOSIS — F41.8 OTHER SPECIFIED ANXIETY DISORDERS: ICD-10-CM

## 2020-09-18 DIAGNOSIS — N39.46 MIXED INCONTINENCE: ICD-10-CM

## 2020-09-18 DIAGNOSIS — E78.5 HYPERLIPIDEMIA, UNSPECIFIED: ICD-10-CM

## 2020-09-18 DIAGNOSIS — L20.9 ATOPIC DERMATITIS, UNSPECIFIED: ICD-10-CM

## 2020-09-18 DIAGNOSIS — M17.12 UNILATERAL PRIMARY OSTEOARTHRITIS, LEFT KNEE: ICD-10-CM

## 2020-09-18 DIAGNOSIS — Z00.00 ENCOUNTER FOR GENERAL ADULT MEDICAL EXAMINATION WITHOUT ABNORMAL FINDINGS: ICD-10-CM

## 2020-10-05 ENCOUNTER — RX RENEWAL (OUTPATIENT)
Age: 65
End: 2020-10-05

## 2021-03-05 ENCOUNTER — APPOINTMENT (OUTPATIENT)
Dept: NEPHROLOGY | Facility: CLINIC | Age: 66
End: 2021-03-05

## 2021-03-05 ENCOUNTER — APPOINTMENT (OUTPATIENT)
Dept: NEPHROLOGY | Facility: CLINIC | Age: 66
End: 2021-03-05
Payer: MEDICARE

## 2021-03-05 PROCEDURE — 99441: CPT

## 2021-03-05 RX ORDER — LISINOPRIL AND HYDROCHLOROTHIAZIDE TABLETS 10; 12.5 MG/1; MG/1
10-12.5 TABLET ORAL DAILY
Qty: 30 | Refills: 0 | Status: DISCONTINUED | COMMUNITY
Start: 2019-07-31 | End: 2021-03-05

## 2021-03-31 ENCOUNTER — RX RENEWAL (OUTPATIENT)
Age: 66
End: 2021-03-31

## 2021-04-29 ENCOUNTER — RX RENEWAL (OUTPATIENT)
Age: 66
End: 2021-04-29

## 2021-06-11 NOTE — ED PROVIDER NOTE - RESPIRATORY, MLM
Preston Rodriguez attended 3 hours of group therapy today.    7/11/2017 8:00 AM Elsie Marquez  
Breath sounds clear and equal bilaterally.

## 2021-06-25 ENCOUNTER — RX RENEWAL (OUTPATIENT)
Age: 66
End: 2021-06-25

## 2021-08-26 ENCOUNTER — NON-APPOINTMENT (OUTPATIENT)
Age: 66
End: 2021-08-26

## 2021-08-27 ENCOUNTER — APPOINTMENT (OUTPATIENT)
Dept: INTERNAL MEDICINE | Facility: CLINIC | Age: 66
End: 2021-08-27

## 2021-09-13 ENCOUNTER — LABORATORY RESULT (OUTPATIENT)
Age: 66
End: 2021-09-13

## 2021-09-13 ENCOUNTER — APPOINTMENT (OUTPATIENT)
Dept: INTERNAL MEDICINE | Facility: CLINIC | Age: 66
End: 2021-09-13
Payer: MEDICARE

## 2021-09-13 ENCOUNTER — OUTPATIENT (OUTPATIENT)
Dept: OUTPATIENT SERVICES | Facility: HOSPITAL | Age: 66
LOS: 1 days | End: 2021-09-13
Payer: MEDICARE

## 2021-09-13 VITALS
BODY MASS INDEX: 29.06 KG/M2 | HEIGHT: 63 IN | OXYGEN SATURATION: 98 % | WEIGHT: 164 LBS | HEART RATE: 65 BPM | SYSTOLIC BLOOD PRESSURE: 124 MMHG | DIASTOLIC BLOOD PRESSURE: 78 MMHG

## 2021-09-13 DIAGNOSIS — M19.90 UNSPECIFIED OSTEOARTHRITIS, UNSPECIFIED SITE: ICD-10-CM

## 2021-09-13 DIAGNOSIS — E78.5 HYPERLIPIDEMIA, UNSPECIFIED: ICD-10-CM

## 2021-09-13 DIAGNOSIS — I10 ESSENTIAL (PRIMARY) HYPERTENSION: ICD-10-CM

## 2021-09-13 DIAGNOSIS — Z23 ENCOUNTER FOR IMMUNIZATION: ICD-10-CM

## 2021-09-13 DIAGNOSIS — R41.3 OTHER AMNESIA: ICD-10-CM

## 2021-09-13 DIAGNOSIS — Z81.8 FAMILY HISTORY OF OTHER MENTAL AND BEHAVIORAL DISORDERS: ICD-10-CM

## 2021-09-13 DIAGNOSIS — F41.8 OTHER SPECIFIED ANXIETY DISORDERS: ICD-10-CM

## 2021-09-13 LAB
A1C WITH ESTIMATED AVERAGE GLUCOSE RESULT: 5.6 % — SIGNIFICANT CHANGE UP (ref 4–5.6)
ESTIMATED AVERAGE GLUCOSE: 114 MG/DL — SIGNIFICANT CHANGE UP (ref 68–114)
HCT VFR BLD CALC: 41.6 % — SIGNIFICANT CHANGE UP (ref 34.5–45)
HGB BLD-MCNC: 13 G/DL — SIGNIFICANT CHANGE UP (ref 11.5–15.5)
MCHC RBC-ENTMCNC: 27.8 PG — SIGNIFICANT CHANGE UP (ref 27–34)
MCHC RBC-ENTMCNC: 31.3 GM/DL — LOW (ref 32–36)
MCV RBC AUTO: 89.1 FL — SIGNIFICANT CHANGE UP (ref 80–100)
PLATELET # BLD AUTO: 317 K/UL — SIGNIFICANT CHANGE UP (ref 150–400)
RBC # BLD: 4.67 M/UL — SIGNIFICANT CHANGE UP (ref 3.8–5.2)
RBC # FLD: 13.3 % — SIGNIFICANT CHANGE UP (ref 10.3–14.5)
TSH SERPL-MCNC: 1.4 UIU/ML — SIGNIFICANT CHANGE UP (ref 0.27–4.2)
WBC # BLD: 9.24 K/UL — SIGNIFICANT CHANGE UP (ref 3.8–10.5)
WBC # FLD AUTO: 9.24 K/UL — SIGNIFICANT CHANGE UP (ref 3.8–10.5)

## 2021-09-13 PROCEDURE — 99214 OFFICE O/P EST MOD 30 MIN: CPT | Mod: GC

## 2021-09-14 LAB
ALBUMIN SERPL ELPH-MCNC: 4.4 G/DL — SIGNIFICANT CHANGE UP (ref 3.3–5)
ALP SERPL-CCNC: 116 U/L — SIGNIFICANT CHANGE UP (ref 40–120)
ALT FLD-CCNC: 13 U/L — SIGNIFICANT CHANGE UP (ref 10–45)
ANION GAP SERPL CALC-SCNC: 19 MMOL/L — HIGH (ref 5–17)
AST SERPL-CCNC: 16 U/L — SIGNIFICANT CHANGE UP (ref 10–40)
BILIRUB SERPL-MCNC: 0.6 MG/DL — SIGNIFICANT CHANGE UP (ref 0.2–1.2)
BUN SERPL-MCNC: 25 MG/DL — HIGH (ref 7–23)
CALCIUM SERPL-MCNC: 9.4 MG/DL — SIGNIFICANT CHANGE UP (ref 8.4–10.5)
CHLORIDE SERPL-SCNC: 98 MMOL/L — SIGNIFICANT CHANGE UP (ref 96–108)
CHOLEST SERPL-MCNC: 257 MG/DL — HIGH
CO2 SERPL-SCNC: 26 MMOL/L — SIGNIFICANT CHANGE UP (ref 22–31)
CREAT SERPL-MCNC: 1.24 MG/DL — SIGNIFICANT CHANGE UP (ref 0.5–1.3)
GLUCOSE SERPL-MCNC: 89 MG/DL — SIGNIFICANT CHANGE UP (ref 70–99)
HCV AB S/CO SERPL IA: 0.11 S/CO — SIGNIFICANT CHANGE UP (ref 0–0.99)
HCV AB SERPL-IMP: SIGNIFICANT CHANGE UP
HDLC SERPL-MCNC: 41 MG/DL — LOW
LIPID PNL WITH DIRECT LDL SERPL: 148 MG/DL — HIGH
NON HDL CHOLESTEROL: 216 MG/DL — HIGH
POTASSIUM SERPL-MCNC: 4 MMOL/L — SIGNIFICANT CHANGE UP (ref 3.5–5.3)
POTASSIUM SERPL-SCNC: 4 MMOL/L — SIGNIFICANT CHANGE UP (ref 3.5–5.3)
PROT SERPL-MCNC: 7.4 G/DL — SIGNIFICANT CHANGE UP (ref 6–8.3)
SODIUM SERPL-SCNC: 142 MMOL/L — SIGNIFICANT CHANGE UP (ref 135–145)
TRIGL SERPL-MCNC: 338 MG/DL — HIGH

## 2021-09-21 ENCOUNTER — LABORATORY RESULT (OUTPATIENT)
Age: 66
End: 2021-09-21

## 2021-09-21 LAB — OB PNL STL IA: NEGATIVE — SIGNIFICANT CHANGE UP

## 2021-09-21 PROCEDURE — G0463: CPT | Mod: 25

## 2021-09-21 PROCEDURE — 83036 HEMOGLOBIN GLYCOSYLATED A1C: CPT

## 2021-09-21 PROCEDURE — 85027 COMPLETE CBC AUTOMATED: CPT

## 2021-09-21 PROCEDURE — 90688 IIV4 VACCINE SPLT 0.5 ML IM: CPT

## 2021-09-21 PROCEDURE — 84443 ASSAY THYROID STIM HORMONE: CPT

## 2021-09-21 PROCEDURE — 82274 ASSAY TEST FOR BLOOD FECAL: CPT

## 2021-09-21 PROCEDURE — 86803 HEPATITIS C AB TEST: CPT

## 2021-09-21 PROCEDURE — 80061 LIPID PANEL: CPT

## 2021-09-21 PROCEDURE — 80053 COMPREHEN METABOLIC PANEL: CPT

## 2021-09-21 PROCEDURE — G0008: CPT

## 2021-09-23 ENCOUNTER — RESULT REVIEW (OUTPATIENT)
Age: 66
End: 2021-09-23

## 2021-09-23 ENCOUNTER — OUTPATIENT (OUTPATIENT)
Dept: OUTPATIENT SERVICES | Facility: HOSPITAL | Age: 66
LOS: 1 days | End: 2021-09-23
Payer: MEDICARE

## 2021-09-23 ENCOUNTER — APPOINTMENT (OUTPATIENT)
Dept: RADIOLOGY | Facility: IMAGING CENTER | Age: 66
End: 2021-09-23
Payer: MEDICARE

## 2021-09-23 ENCOUNTER — APPOINTMENT (OUTPATIENT)
Dept: MAMMOGRAPHY | Facility: IMAGING CENTER | Age: 66
End: 2021-09-23
Payer: MEDICARE

## 2021-09-23 ENCOUNTER — APPOINTMENT (OUTPATIENT)
Dept: CT IMAGING | Facility: IMAGING CENTER | Age: 66
End: 2021-09-23
Payer: MEDICARE

## 2021-09-23 DIAGNOSIS — M17.12 UNILATERAL PRIMARY OSTEOARTHRITIS, LEFT KNEE: ICD-10-CM

## 2021-09-23 DIAGNOSIS — Z00.8 ENCOUNTER FOR OTHER GENERAL EXAMINATION: ICD-10-CM

## 2021-09-23 DIAGNOSIS — R41.3 OTHER AMNESIA: ICD-10-CM

## 2021-09-23 PROCEDURE — 77080 DXA BONE DENSITY AXIAL: CPT | Mod: 26

## 2021-09-23 PROCEDURE — 77063 BREAST TOMOSYNTHESIS BI: CPT | Mod: 26

## 2021-09-23 PROCEDURE — 77067 SCR MAMMO BI INCL CAD: CPT | Mod: 26

## 2021-09-23 PROCEDURE — 70450 CT HEAD/BRAIN W/O DYE: CPT | Mod: 26

## 2021-09-23 PROCEDURE — 70450 CT HEAD/BRAIN W/O DYE: CPT

## 2021-09-23 PROCEDURE — 73560 X-RAY EXAM OF KNEE 1 OR 2: CPT | Mod: 26,LT

## 2021-09-23 PROCEDURE — 73560 X-RAY EXAM OF KNEE 1 OR 2: CPT

## 2021-09-23 PROCEDURE — 77080 DXA BONE DENSITY AXIAL: CPT

## 2021-09-23 PROCEDURE — 77063 BREAST TOMOSYNTHESIS BI: CPT

## 2021-09-23 PROCEDURE — 77067 SCR MAMMO BI INCL CAD: CPT

## 2021-09-27 NOTE — PLAN
[FreeTextEntry1] : #Memory loss\par - mini mental status 30/30\par - possible memory loss associated with aging vs, pseudodementia given PHQ-9 is 12 vs. vascular dementia vs. Alzheimer's dementia\par - given FH dementia will check CTH; r/o volume loss or ischemia. Patient reports her sisters have suffered strokes\par - check TSH, B12\par - c/w f/u with psychiatrist and therapist patient feels this has improved her depression\par \par #Left knee OA \par - advised patient against taking advil nightly; only as needed <3-4 X per week\par - advised instead to take tylenol 1000 TID PRN for pain\par - lidocaine patch sent to pharmacy\par - ortho referral: patient not interested in surgery but is interested in injections\par - PT referral provided \par \par #Depression\par - c/w escitalopram\par - PHQ-9 is 12 today\par - c/w psychiatry f/u Dr. Mclean and therapist Roselia\par \par #HTN\par - c/w valsartan-HCTZ 80-12.5 qD\par - BPs well controlled\par \par \par #HCM\par - COVID - already received June/July 2021 Pfizer, Flu Shot - administered today 9/13/21, Pneumovax - received in 2019\par - TDAP up to date 2017\par - Zoster U2D 2017\par - Colonoscopy screen provided, repeat mammo referral provided (in 2019 was [facility]Rads2)\par - DEXA: referral provided\par - PAP + HPV negative Jan 2018, can d/c screening\par - check CBC, CMP, lipid, a1c, HCV \par \par RTC 5 weeks to follow up referrals (ortho), f/u for moderate depression

## 2021-09-27 NOTE — REVIEW OF SYSTEMS
[Joint Pain] : joint pain [Confusion] : confusion [Memory Loss] : memory loss [Fever] : no fever [Discharge] : no discharge [Earache] : no earache [Chest Pain] : no chest pain [Dysuria] : no dysuria [Itching] : no itching [Headache] : no headache [Suicidal] : not suicidal [Easy Bleeding] : no easy bleeding

## 2021-09-27 NOTE — PHYSICAL EXAM
[No Acute Distress] : no acute distress [Well Nourished] : well nourished [Well Developed] : well developed [Well-Appearing] : well-appearing [Normal Sclera/Conjunctiva] : normal sclera/conjunctiva [PERRL] : pupils equal round and reactive to light [EOMI] : extraocular movements intact [Normal Outer Ear/Nose] : the outer ears and nose were normal in appearance [Normal Oropharynx] : the oropharynx was normal [No JVD] : no jugular venous distention [No Lymphadenopathy] : no lymphadenopathy [Supple] : supple [Thyroid Normal, No Nodules] : the thyroid was normal and there were no nodules present [No Respiratory Distress] : no respiratory distress  [No Accessory Muscle Use] : no accessory muscle use [Clear to Auscultation] : lungs were clear to auscultation bilaterally [Normal Rate] : normal rate  [Regular Rhythm] : with a regular rhythm [Normal S1, S2] : normal S1 and S2 [No Murmur] : no murmur heard [No Carotid Bruits] : no carotid bruits [No Abdominal Bruit] : a ~M bruit was not heard ~T in the abdomen [No Varicosities] : no varicosities [Pedal Pulses Present] : the pedal pulses are present [No Edema] : there was no peripheral edema [No Palpable Aorta] : no palpable aorta [No Extremity Clubbing/Cyanosis] : no extremity clubbing/cyanosis [Soft] : abdomen soft [Non Tender] : non-tender [Non-distended] : non-distended [No Masses] : no abdominal mass palpated [No HSM] : no HSM [Normal Bowel Sounds] : normal bowel sounds [Normal Posterior Cervical Nodes] : no posterior cervical lymphadenopathy [Normal Anterior Cervical Nodes] : no anterior cervical lymphadenopathy [No CVA Tenderness] : no CVA  tenderness [No Spinal Tenderness] : no spinal tenderness [No Rash] : no rash [Coordination Grossly Intact] : coordination grossly intact [No Focal Deficits] : no focal deficits [Normal Gait] : normal gait [Deep Tendon Reflexes (DTR)] : deep tendon reflexes were 2+ and symmetric [Normal Affect] : the affect was normal [Normal Insight/Judgement] : insight and judgment were intact [de-identified] : bilateral knee crepitus left >right. Left knee with osteophytes, no obviously palpable effusion. Tenderness elicited on extension of leg/knee. Negative anterior drawer test. Pain under thigh with left leg raise.

## 2021-09-27 NOTE — HISTORY OF PRESENT ILLNESS
[FreeTextEntry1] : CPE [de-identified] : 66F PMH HTN pre-DM depression L knee OA, here for CPE and BP check.\par \par #Left knee OA \par - reports history of severe OA; reports she has received knee injections which previously helped but was told that she may need surgery\par - she is currently not interested in surgery but is interested in knee injections\par - reports her main symptoms are pain with walking; did not report any falls\par - has been taking advil 200mg qHS for knee pain\par - uses knee brace\par \par #memory loss\par - patient reports she's noticed memory loss: forgets where she placed things, confuses her grandchildren\par - also reports she is getting lost in a familiar place: IE getting lost in the supermarket when her  does not drop her off at the exact front entrance. She reports she then has to trace her steps back in order to find the front entrance\par - does not report any wandering or getting lost\par - she is concerned regarding dementia as her mother developed dementia at age 75. She has started to take Prevagen.\par \par #Depression\par - PHQ-9 today is 12: main symptoms of little interest, binge-eating, difficulty concentrating\par - denies SI\par - follows with psychiatrist Dr. Mclean and psychotherapist Roselia; has increased her frequency of visits with therapist and reports this has improved her depresison\par \par \par #HTN\par - compliant with meds: valsartan-HCTZ 80-12.5 qD\par - denies side effects on this medication

## 2021-10-01 ENCOUNTER — RX RENEWAL (OUTPATIENT)
Age: 66
End: 2021-10-01

## 2021-11-10 NOTE — H&P ADULT - PMH
Denies known Latex allergy or symptoms of Latex sensitivity.  Tobacco use verified.      Cally Palacios is a 62 year old female presenting for a follow-up for fibromyalgia, OA; LOV 05/03/21.    Patient had fallen about a 3 week ago and now experiencing left shoulder pain. Patient states pain is radiating into her left arm.      Medications verified, no changes.  Patient would like refills, scripts loaded pending your approval.    
Hyperlipidemia    Hypertension

## 2022-03-14 RX ORDER — ATORVASTATIN CALCIUM 80 MG/1
80 TABLET, FILM COATED ORAL
Qty: 90 | Refills: 2 | Status: ACTIVE | COMMUNITY
Start: 2017-08-17 | End: 1900-01-01

## 2022-03-21 NOTE — ED ADULT NURSE NOTE - CHIEF COMPLAINT
No symptomatic recurrences.  Anticoagulated with apixaban.  Rhythm control with flecainide.  Stress test was negative for ischemia last year.   The patient is a 64y Female complaining of

## 2022-04-11 ENCOUNTER — RX RENEWAL (OUTPATIENT)
Age: 67
End: 2022-04-11

## 2022-04-18 NOTE — ED ADULT TRIAGE NOTE - SPO2 (%)
100 [Abdominal Pain] : abdominal pain [Constipation] : constipation [Negative] : Integumentary [Fever] : no fever [Chills] : no chills [Chest Pain] : no chest pain [Palpitations] : no palpitations [Shortness Of Breath] : no shortness of breath [Cough] : no cough [Diarrhea] : no diarrhea

## 2022-04-25 ENCOUNTER — APPOINTMENT (OUTPATIENT)
Dept: NEPHROLOGY | Facility: CLINIC | Age: 67
End: 2022-04-25
Payer: MEDICARE

## 2022-04-25 VITALS — SYSTOLIC BLOOD PRESSURE: 167 MMHG | HEART RATE: 67 BPM | DIASTOLIC BLOOD PRESSURE: 94 MMHG

## 2022-04-25 PROCEDURE — 99214 OFFICE O/P EST MOD 30 MIN: CPT

## 2022-04-25 PROCEDURE — 99215 OFFICE O/P EST HI 40 MIN: CPT

## 2022-04-25 NOTE — PHYSICAL EXAM
[General Appearance - Alert] : alert [General Appearance - In No Acute Distress] : in no acute distress [General Appearance - Well Nourished] : well nourished [General Appearance - Well Developed] : well developed [Sclera] : the sclera and conjunctiva were normal [Hearing Threshold Finger Rub Not Jack] : hearing was normal [Examination Of The Oral Cavity] : the lips and gums were normal [Nasal Cavity] : the nasal mucosa and septum were normal [Oropharynx] : the oropharynx was normal [Neck Appearance] : the appearance of the neck was normal [Neck Cervical Mass (___cm)] : no neck mass was observed [Jugular Venous Distention Increased] : there was no jugular-venous distention [Respiration, Rhythm And Depth] : normal respiratory rhythm and effort [Exaggerated Use Of Accessory Muscles For Inspiration] : no accessory muscle use [Auscultation Breath Sounds / Voice Sounds] : lungs were clear to auscultation bilaterally [Heart Sounds] : normal S1 and S2 [Heart Sounds Gallop] : no gallops [Murmurs] : no murmurs [Heart Sounds Pericardial Friction Rub] : no pericardial rub [Edema] : there was no peripheral edema [Bowel Sounds] : normal bowel sounds [Abdomen Soft] : soft [Abdomen Tenderness] : non-tender [] : no hepato-splenomegaly [No CVA Tenderness] : no ~M costovertebral angle tenderness [No Spinal Tenderness] : no spinal tenderness [Abnormal Walk] : normal gait [Oriented To Time, Place, And Person] : oriented to person, place, and time [Impaired Insight] : insight and judgment were intact [Affect] : the affect was normal [Mood] : the mood was normal [FreeTextEntry1] : no appreciable edema on my physical examination.

## 2022-04-25 NOTE — ASSESSMENT
[FreeTextEntry1] : Today I had the pleasure of seeing Magdalena Springer who is a 66 years old woman here for evaluation of resistant hypertension.\par \par Resistant Hypertension -- The patient has a strong family history of early onset hypertension and her sisters have had strokes.  Most secondary causes have been ruled out.  Her blood pressure is now substantially improved on Valsartan HCTZ however still elevated above goal.  She has a very renin-angiotensive sensitive hypertension.   After repeating her blood pressure twice today in the office she is still in the systolic 160's.  I discussed with her the possibility that we may need to further titrate her medications.  We agreed on a plan for her to monitor her BP at home for a week and call me with the results.  Since she has not seen internal medicine for a number of months will do routine blood work today.\par \par Edema of the lower extremity --  The patient reports that she has edema of the lower extremity on the left worse during car rides.  Has never had a doppler before.  Given her frequent long car rides and her symptoms will check a duplex of the lower extremities.  She states this has been going on and off for about a year.  There was no edema on my examination.\par \par f/u in six months

## 2022-04-25 NOTE — REVIEW OF SYSTEMS
[Lower Ext Edema] : lower extremity edema [Fever] : no fever [Chills] : no chills [Feeling Poorly] : not feeling poorly [Feeling Tired] : not feeling tired [Eyesight Problems] : no eyesight problems [Chest Pain] : no chest pain [Shortness Of Breath] : no shortness of breath [Wheezing] : no wheezing [Abdominal Pain] : no abdominal pain [Vomiting] : no vomiting [Diarrhea] : no diarrhea [Dysuria] : no dysuria [Incontinence] : no incontinence [Joint Swelling] : no joint swelling [Joint Stiffness] : no joint stiffness [Itching] : no itching [Dizziness] : no dizziness [Fainting] : no fainting [Anxiety] : no anxiety [Depression] : no depression [Easy Bleeding] : no tendency for easy bleeding [Easy Bruising] : no tendency for easy bruising
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
## 2022-04-26 ENCOUNTER — NON-APPOINTMENT (OUTPATIENT)
Age: 67
End: 2022-04-26

## 2022-04-26 LAB
ALBUMIN SERPL ELPH-MCNC: 4.3 G/DL
ANION GAP SERPL CALC-SCNC: 11 MMOL/L
BASOPHILS # BLD AUTO: 0.03 K/UL
BASOPHILS NFR BLD AUTO: 0.3 %
BUN SERPL-MCNC: 18 MG/DL
CALCIUM SERPL-MCNC: 10 MG/DL
CHLORIDE SERPL-SCNC: 102 MMOL/L
CO2 SERPL-SCNC: 30 MMOL/L
CREAT SERPL-MCNC: 1.03 MG/DL
CREAT SPEC-SCNC: 70 MG/DL
CREAT/PROT UR: 0.1 RATIO
EGFR: 60 ML/MIN/1.73M2
EOSINOPHIL # BLD AUTO: 0.15 K/UL
EOSINOPHIL NFR BLD AUTO: 1.4 %
ESTIMATED AVERAGE GLUCOSE: 117 MG/DL
FERRITIN SERPL-MCNC: 76 NG/ML
GLUCOSE SERPL-MCNC: 85 MG/DL
HBA1C MFR BLD HPLC: 5.7 %
HCT VFR BLD CALC: 42 %
HGB BLD-MCNC: 13.5 G/DL
IMM GRANULOCYTES NFR BLD AUTO: 0.4 %
IRON SATN MFR SERPL: 17 %
IRON SERPL-MCNC: 55 UG/DL
LYMPHOCYTES # BLD AUTO: 2.76 K/UL
LYMPHOCYTES NFR BLD AUTO: 25.2 %
MAN DIFF?: NORMAL
MCHC RBC-ENTMCNC: 27.8 PG
MCHC RBC-ENTMCNC: 32.1 GM/DL
MCV RBC AUTO: 86.4 FL
MONOCYTES # BLD AUTO: 0.66 K/UL
MONOCYTES NFR BLD AUTO: 6 %
NEUTROPHILS # BLD AUTO: 7.31 K/UL
NEUTROPHILS NFR BLD AUTO: 66.7 %
PHOSPHATE SERPL-MCNC: 3.7 MG/DL
PLATELET # BLD AUTO: 295 K/UL
POTASSIUM SERPL-SCNC: 4.4 MMOL/L
PROT UR-MCNC: 6 MG/DL
RBC # BLD: 4.86 M/UL
RBC # FLD: 13.6 %
SODIUM SERPL-SCNC: 142 MMOL/L
TIBC SERPL-MCNC: 325 UG/DL
UIBC SERPL-MCNC: 269 UG/DL
WBC # FLD AUTO: 10.95 K/UL

## 2022-05-05 ENCOUNTER — APPOINTMENT (OUTPATIENT)
Dept: ULTRASOUND IMAGING | Facility: CLINIC | Age: 67
End: 2022-05-05
Payer: MEDICARE

## 2022-05-05 ENCOUNTER — OUTPATIENT (OUTPATIENT)
Dept: OUTPATIENT SERVICES | Facility: HOSPITAL | Age: 67
LOS: 1 days | End: 2022-05-05
Payer: MEDICARE

## 2022-05-05 DIAGNOSIS — Z00.8 ENCOUNTER FOR OTHER GENERAL EXAMINATION: ICD-10-CM

## 2022-05-05 PROCEDURE — 93971 EXTREMITY STUDY: CPT | Mod: 26,LT

## 2022-05-05 PROCEDURE — 93971 EXTREMITY STUDY: CPT

## 2022-05-06 ENCOUNTER — NON-APPOINTMENT (OUTPATIENT)
Age: 67
End: 2022-05-06

## 2022-06-22 ENCOUNTER — APPOINTMENT (OUTPATIENT)
Dept: INTERNAL MEDICINE | Facility: CLINIC | Age: 67
End: 2022-06-22
Payer: MEDICARE

## 2022-06-22 VITALS
HEIGHT: 63 IN | SYSTOLIC BLOOD PRESSURE: 156 MMHG | DIASTOLIC BLOOD PRESSURE: 80 MMHG | HEART RATE: 67 BPM | OXYGEN SATURATION: 98 % | WEIGHT: 167 LBS | BODY MASS INDEX: 29.59 KG/M2

## 2022-06-22 VITALS — SYSTOLIC BLOOD PRESSURE: 150 MMHG | DIASTOLIC BLOOD PRESSURE: 80 MMHG

## 2022-06-22 DIAGNOSIS — R10.9 UNSPECIFIED ABDOMINAL PAIN: ICD-10-CM

## 2022-06-22 PROCEDURE — 99214 OFFICE O/P EST MOD 30 MIN: CPT

## 2022-06-22 RX ORDER — ESCITALOPRAM OXALATE 10 MG/1
10 TABLET ORAL
Refills: 0 | Status: DISCONTINUED | COMMUNITY
Start: 2021-03-05 | End: 2022-06-22

## 2022-06-22 RX ORDER — PENICILLIN V POTASSIUM 500 MG/1
500 TABLET, FILM COATED ORAL
Qty: 40 | Refills: 0 | Status: DISCONTINUED | COMMUNITY
Start: 2022-01-08

## 2022-06-22 NOTE — HISTORY OF PRESENT ILLNESS
[FreeTextEntry8] : YESSICA DOVER  is a 67 year old female  with history of  hx of gastric ulcer, refractory HTN, HLD, intermittent asthma, memory loss, b/l knee patellofemoral arthritis, urinary incontinence, anxiety and depression, edema of lower extremity presented today for abdominal pain. \par \par She reported that she ate ate hot spicy food then developed severe abdominal pain and distension, sense of fainting out, nausea but no vomiting 2weeks ago that made her go to [facility]. She did not think any food poisoning since she ate fully cooked rice, stewed chicken, red bean soup. She enjoys hot spicy food all the time. She was prescribed with omeprazole 20mg po bid and regularly took it with little effect. She took Omeprazole with food together.  \par \par GI history\par 2017 EGD. can't remember medication name.Was told she had light bleeding from stomach due to lots of life stress. \par colonoscopy 2 times all okay for screening outside. \par \par LMP: 18 yeas ago . no vaginal discharge. \par \par BM : usually constipated. tried green smoodie or milk makes to make her bowel move. Not constipated.Last BM yesterday with enough volume and formed one. \par Denies urinary symptoms, flank pain\par Denies GYN issues. post menopause 18 years ago. No vaginal bleeding. \par Denied fever, chills,CP, SOB.\par

## 2022-06-22 NOTE — ASSESSMENT
[FreeTextEntry1] : \par YESSICA DOVER  is a 67 year old female  with history of  hx of gastric ulcer, refractory HTN, HLD, intermittent asthma, memory loss, b/l knee patellofemoral arthritis, urinary incontinence, anxiety and depression, edema of lower extremity presented today for abdominal pain.\par \par # navel tenderness that radiating to b/l flank for 2 weeks.\par -stop NSAID and try Tylenol or topical patch for joint pain.\par -stool H pylori ag test for hx of gastric ulcer. Rx ang specimen cup given.\par -Continue omeprazole to 20mg po bid 30-60min before meals. \par -Don't take late food and lie down.\par -Avoid caffein, spicy, greecy food or large portion.\par -add famotidine for breakthrough abd pain\par -US abdomen: Rx given\par -Refer to GI\par \par # refractory HTN\par Repeated /80 without CP, palpitation, SOB or leg swelling. \par Recently checked left leg Doppler and negative for DVT.\par closely monitor home BP and f/u with Dr. Shepherd, nephrology.\par Continue current management including low salt diet and regular exercise.\par Take valsartan-HCTZ 80-12.5mg po qd. \par \par \par f/u result

## 2022-06-28 LAB — H PYLORI AG STL QL: POSITIVE

## 2022-07-01 ENCOUNTER — NON-APPOINTMENT (OUTPATIENT)
Age: 67
End: 2022-07-01

## 2022-07-07 ENCOUNTER — APPOINTMENT (OUTPATIENT)
Dept: INTERNAL MEDICINE | Facility: CLINIC | Age: 67
End: 2022-07-07

## 2022-07-11 ENCOUNTER — RX RENEWAL (OUTPATIENT)
Age: 67
End: 2022-07-11

## 2022-07-22 ENCOUNTER — APPOINTMENT (OUTPATIENT)
Dept: INTERNAL MEDICINE | Facility: CLINIC | Age: 67
End: 2022-07-22

## 2022-07-22 ENCOUNTER — OUTPATIENT (OUTPATIENT)
Dept: OUTPATIENT SERVICES | Facility: HOSPITAL | Age: 67
LOS: 1 days | End: 2022-07-22
Payer: MEDICARE

## 2022-07-22 VITALS
HEART RATE: 71 BPM | HEIGHT: 63 IN | OXYGEN SATURATION: 97 % | DIASTOLIC BLOOD PRESSURE: 62 MMHG | WEIGHT: 159 LBS | BODY MASS INDEX: 28.17 KG/M2 | SYSTOLIC BLOOD PRESSURE: 126 MMHG

## 2022-07-22 DIAGNOSIS — I10 ESSENTIAL (PRIMARY) HYPERTENSION: ICD-10-CM

## 2022-07-22 PROCEDURE — 99213 OFFICE O/P EST LOW 20 MIN: CPT | Mod: GE

## 2022-07-22 NOTE — HISTORY OF PRESENT ILLNESS
[FreeTextEntry1] : H. Pylori f/u post treatment [de-identified] : Completed bismuth quadruple therapy and changed diet to avoid all spicy and acidic foods. Some residual bloating noted as only complaint. Reports stool was black 2/2 bismuth until today, brown stool noted. Reports consistently having BM over course of therapy as compared to previous. Informed patient to collect stool antigen test kit to be collected next Friday 7/29 to assess for eradication of bacteria. Simethicone prescribed for bloating.

## 2022-07-22 NOTE — PHYSICAL EXAM
[No Acute Distress] : no acute distress [Well Nourished] : well nourished [Well Developed] : well developed [Well-Appearing] : well-appearing [Normal Sclera/Conjunctiva] : normal sclera/conjunctiva [Normal Outer Ear/Nose] : the outer ears and nose were normal in appearance [Normal Oropharynx] : the oropharynx was normal [No JVD] : no jugular venous distention [No Respiratory Distress] : no respiratory distress  [No Accessory Muscle Use] : no accessory muscle use [Normal Rate] : normal rate  [Regular Rhythm] : with a regular rhythm [Normal S1, S2] : normal S1 and S2 [Pedal Pulses Present] : the pedal pulses are present [No Edema] : there was no peripheral edema [Soft] : abdomen soft [Non Tender] : non-tender [No Masses] : no abdominal mass palpated [No HSM] : no HSM [Normal Bowel Sounds] : normal bowel sounds [No CVA Tenderness] : no CVA  tenderness [No Joint Swelling] : no joint swelling [No Rash] : no rash [No Focal Deficits] : no focal deficits [Normal Gait] : normal gait [Deep Tendon Reflexes (DTR)] : deep tendon reflexes were 2+ and symmetric [Normal Affect] : the affect was normal [Normal] : affect was normal and insight and judgment were intact [de-identified] : Mildly distended

## 2022-07-22 NOTE — REVIEW OF SYSTEMS
[Negative] : Heme/Lymph [Abdominal Pain] : no abdominal pain [Nausea] : no nausea [Constipation] : no constipation [Diarrhea] : diarrhea [Vomiting] : no vomiting [Heartburn] : no heartburn [Melena] : no melena [FreeTextEntry7] : Bloating

## 2022-07-29 ENCOUNTER — APPOINTMENT (OUTPATIENT)
Dept: VASCULAR SURGERY | Facility: CLINIC | Age: 67
End: 2022-07-29

## 2022-07-29 VITALS
HEART RATE: 62 BPM | SYSTOLIC BLOOD PRESSURE: 154 MMHG | BODY MASS INDEX: 28.17 KG/M2 | WEIGHT: 159 LBS | DIASTOLIC BLOOD PRESSURE: 89 MMHG | HEIGHT: 63 IN

## 2022-07-29 PROCEDURE — G0463: CPT

## 2022-07-29 PROCEDURE — 99204 OFFICE O/P NEW MOD 45 MIN: CPT

## 2022-07-29 PROCEDURE — 87338 HPYLORI STOOL AG IA: CPT

## 2022-07-29 NOTE — ASSESSMENT
[FreeTextEntry1] : Patient with left leg edema.\par \par No evidence of DVT on duplex done as an outpatient.  No evidence of arterial insufficiency.\par \par Suspect left lower extremity venous insufficiency versus may Thurner syndrome.\par \par We will schedule the patient for venous duplex to evaluate for insufficiency and iliac vein duplex to evaluate for stenosis versus compression.\par \par Recommend compression stockings and elevation.\par \par This was all discussed with the patient in detail.

## 2022-07-29 NOTE — HISTORY OF PRESENT ILLNESS
[FreeTextEntry1] : Patient is an 67-year-old female with past medical history significant for hypertension being followed up by the nephrology service, no significant renal insufficiency, no history of diabetes or coronary artery disease, presenting to us for evaluation of a left lower extremity edema.  Swelling happens after long periods of sitting or standing especially in hot weather.  No complaint of claudication or rest pain.  No history of DVT.  No chest pain or shortness of breath.  No fevers or chills.  Patient does not wear compression stockings.  No history of smoking.

## 2022-07-29 NOTE — PHYSICAL EXAM
[Normal Breath Sounds] : Normal breath sounds [Normal Heart Sounds] : normal heart sounds [2+] : left 2+ [Alert] : alert [Oriented to Person] : oriented to person [Oriented to Place] : oriented to place [JVD] : no jugular venous distention  [Ankle Swelling (On Exam)] : not present [Varicose Veins Of Lower Extremities] : not present [] : not present [Abdomen Masses] : No abdominal masses [Skin Ulcer] : no ulcer

## 2022-07-29 NOTE — CONSULT LETTER
[Dear  ___] : Dear  [unfilled], [Consult Letter:] : I had the pleasure of evaluating your patient, [unfilled]. [Please see my note below.] : Please see my note below. [Consult Closing:] : Thank you very much for allowing me to participate in the care of this patient.  If you have any questions, please do not hesitate to contact me. [Sincerely,] : Sincerely, [FreeTextEntry3] : Sy Landers M.D., F.MEIS., R.P.MORRIS.I.\par  of Vascular Surgery\par Assistant Professor of Radiology\par Director of Endovascular Program/ [facility]\par [facility]

## 2022-08-01 LAB — H PYLORI AG STL QL: NEGATIVE

## 2022-08-04 DIAGNOSIS — A04.8 OTHER SPECIFIED BACTERIAL INTESTINAL INFECTIONS: ICD-10-CM

## 2022-09-14 ENCOUNTER — APPOINTMENT (OUTPATIENT)
Dept: CARE COORDINATION | Facility: HOME HEALTH | Age: 67
End: 2022-09-14

## 2022-09-14 DIAGNOSIS — M06.9 RHEUMATOID ARTHRITIS, UNSPECIFIED: ICD-10-CM

## 2022-09-14 DIAGNOSIS — M19.90 UNSPECIFIED OSTEOARTHRITIS, UNSPECIFIED SITE: ICD-10-CM

## 2022-09-14 DIAGNOSIS — R60.0 LOCALIZED EDEMA: ICD-10-CM

## 2022-09-14 PROCEDURE — 99350 HOME/RES VST EST HIGH MDM 60: CPT | Mod: 95

## 2022-09-14 RX ORDER — OMEPRAZOLE 20 MG/1
20 CAPSULE, DELAYED RELEASE ORAL TWICE DAILY
Qty: 28 | Refills: 0 | Status: DISCONTINUED | COMMUNITY
Start: 2022-06-27 | End: 2022-09-14

## 2022-09-14 RX ORDER — TETRACYCLINE HYDROCHLORIDE 500 MG/1
500 CAPSULE ORAL EVERY 6 HOURS
Qty: 56 | Refills: 0 | Status: DISCONTINUED | COMMUNITY
Start: 2022-06-27 | End: 2022-09-14

## 2022-09-14 RX ORDER — UNDERPADS 23"X24"
EACH MISCELLANEOUS
Qty: 30 | Refills: 1 | Status: DISCONTINUED | COMMUNITY
Start: 2019-07-18 | End: 2022-09-14

## 2022-09-14 RX ORDER — METRONIDAZOLE 500 MG/1
500 TABLET ORAL EVERY 6 HOURS
Qty: 56 | Refills: 0 | Status: DISCONTINUED | COMMUNITY
Start: 2022-06-27 | End: 2022-09-14

## 2022-09-14 RX ORDER — OMEPRAZOLE 20 MG/1
20 CAPSULE, DELAYED RELEASE ORAL
Refills: 0 | Status: DISCONTINUED | COMMUNITY
Start: 2022-06-08 | End: 2022-09-14

## 2022-09-14 RX ORDER — BISMUTH SUBSALICYLATE 262 MG/1
262 TABLET, CHEWABLE ORAL 4 TIMES DAILY
Qty: 112 | Refills: 0 | Status: DISCONTINUED | COMMUNITY
Start: 2022-06-27 | End: 2022-09-14

## 2022-09-14 NOTE — COUNSELING
[Fall prevention counseling provided] : Fall prevention counseling provided [Use proper foot wear] : Use proper foot wear [Use recommended devices] : Use recommended devices [Benefits of weight loss discussed] : Benefits of weight loss discussed [Encouraged to increase physical activity] : Encouraged to increase physical activity [Target Wt Loss Goal ___] : Weight Loss Goals: Target weight loss goal [unfilled] lbs [None] : None

## 2022-09-15 NOTE — CURRENT MEDS
[Takes medication as prescribed] : takes [Side Effects] :  side effects [Yes] : Reviewed medication list for presence of high-risk medications.

## 2022-09-16 PROBLEM — R60.0 EDEMA OF LOWER EXTREMITY: Status: ACTIVE | Noted: 2022-04-25

## 2022-09-16 PROBLEM — M06.9 RHEUMATOID ARTHRITIS OF KNEE: Status: ACTIVE | Noted: 2022-09-16

## 2022-09-16 NOTE — REVIEW OF SYSTEMS
[Shortness Of Breath] : shortness of breath [Constipation] : constipation [Heartburn] : heartburn [Incontinence] : incontinence [Joint Pain] : joint pain [Joint Stiffness] : joint stiffness [Fever] : no fever [Chills] : no chills [Fatigue] : no fatigue [Pain] : no pain [Dryness] : no dryness  [Vision Problems] : no vision problems [Hearing Loss] : no hearing loss [Nosebleed] : no nosebleeds [Nasal Discharge] : no nasal discharge [Chest Pain] : no chest pain [Wheezing] : no wheezing [Cough] : no cough [Abdominal Pain] : no abdominal pain [Nausea] : no nausea [Diarrhea] : diarrhea [Vomiting] : no vomiting [Dysuria] : no dysuria [Nocturia] : no nocturia [Hematuria] : no hematuria [Muscle Weakness] : no muscle weakness [FreeTextEntry2] : c/o sweating most times

## 2022-09-16 NOTE — PHYSICAL EXAM
[No Acute Distress] : no acute distress [Well Nourished] : well nourished [Normal Outer Ear/Nose] : the outer ears and nose were normal in appearance [Normal Oropharynx] : the oropharynx was normal [No JVD] : no jugular venous distention [No Respiratory Distress] : no respiratory distress  [Clear to Auscultation] : lungs were clear to auscultation bilaterally [No Edema] : there was no peripheral edema [Speech Grossly Normal] : speech grossly normal [Memory Grossly Normal] : memory grossly normal [Normal Affect] : the affect was normal [Alert and Oriented x3] : oriented to person, place, and time [Normal Mood] : the mood was normal [Normal Insight/Judgement] : insight and judgment were intact [de-identified] : heart murmer - told by MD 3-4yrs ago [de-identified] : edema up and down

## 2022-09-16 NOTE — HISTORY OF PRESENT ILLNESS
[Home] : at home, [unfilled] , at the time of the visit. [Other Location: e.g. Home (Enter Location, City,State)___] : at [unfilled] [Verbal consent obtained from patient] : the patient, [unfilled] [FreeTextEntry2] : [facility] Annual Assessment: Ms. Springer is a 68 yo female with PMH of RA, OA, HTN, HPylori, anxiety and depression, knee pain, lower extremity edema. Confirmed she completed treatment for h pylori and is now negative. Ongoing knee pain from RA - needs surgery but is afraid due to what others experience with the surgery. Has to keep BLE elevated when seated to decrease the edema. She denies SOB, CP, skin or temperature changes,accompanying the edema, has seen her PCP, vascular  and cardiologist and has been worked up for it , no DVT or cellulitis identified. Does not wear support stockings, say elevating the legs help. GFR, BUN and Creatinine are all wnl last labs 4/2022.  She sees a psychiatrist monthly  and a therapist q2 weeks for anxiety and depression - taking medications, reports being managed well with current treatment. Mood during the visit very upbeat and positive. \par States she has been told at last PCP visit to monitor her diet as A1c is going to pre-diabetic. A1C of 5.7 reviewed diet with pt instructed her to decrease nightly ice cream having maybe 3 times per week and taking 1/2 her regular amount and adding fruits such as blueberries then eventually cutting to once weekly, reduce bagel, bread, cakes, cookies and soda, juice. Add brown rice, whole wheat products. States she would.

## 2022-09-16 NOTE — HEALTH RISK ASSESSMENT
[Never] : Never [Monthly or less (1 pt)] : Monthly or less (1 point) [No] : In the past 12 months have you used drugs other than those required for medical reasons? No [No falls in past year] : Patient reported no falls in the past year [0] : 2) Feeling down, depressed, or hopeless: Not at all (0) [PHQ-2 Negative - No further assessment needed] : PHQ-2 Negative - No further assessment needed [Low Fat Diet] : low fat [Low Carb Diet] : low carbohydrate [Low Salt Diet] : low salt [General Adherence] : general adherence [Patient reported bone density results were normal] : Patient reported bone density results were normal [None] : None [With Significant Other] : lives with significant other [With Family] : lives with family [# of Members in Household ___] :  household currently consist of [unfilled] member(s) [Retired] : retired [] :  [# Of Children ___] : has [unfilled] children [Fully functional (bathing, dressing, toileting, transferring, walking, feeding)] : Fully functional (bathing, dressing, toileting, transferring, walking, feeding) [Fully functional (using the telephone, shopping, preparing meals, housekeeping, doing laundry, using] : Fully functional and needs no help or supervision to perform IADLs (using the telephone, shopping, preparing meals, housekeeping, doing laundry, using transportation, managing medications and managing finances) [Smoke Detector] : smoke detector [Carbon Monoxide Detector] : carbon monoxide detector [Safety elements used in home] : safety elements used in home [Seat Belt] :  uses seat belt [Aggressive treatment] : aggressive treatment [de-identified] : socially 1 drink per year, "not with my medications [de-identified] : looking to start soon [de-identified] : regular, diet  [[id number]] : 0 [Change in mental status noted] : No change in mental status noted [Language] : denies difficulty with language [Behavior] : denies difficulty with behavior [Learning/Retaining New Information] : denies difficulty learning/retaining new information [Reasoning] : denies difficulty with reasoning [Sexually Active] : not sexually active [High Risk Behavior] : no high risk behavior [Reports changes in hearing] : Reports no changes in hearing [Reports changes in dental health] : Reports no changes in dental health [Guns at Home] : no guns at home [Sunscreen] : does not use sunscreen [Travel to Developing Areas] : does not  travel to developing areas [TB Exposure] : is not being exposed to tuberculosis [Caregiver Concerns] : does not have caregiver concerns [MammogramComments] : need appt [PapSmearComments] : need appt [BoneDensityDate] : 9/23/2021 [BoneDensityComments] : n [ColonoscopyComments] : needs appointment [HIVComments] : did in the past

## 2022-09-30 ENCOUNTER — APPOINTMENT (OUTPATIENT)
Dept: VASCULAR SURGERY | Facility: CLINIC | Age: 67
End: 2022-09-30

## 2022-09-30 VITALS — HEART RATE: 61 BPM | SYSTOLIC BLOOD PRESSURE: 171 MMHG | DIASTOLIC BLOOD PRESSURE: 91 MMHG

## 2022-09-30 PROCEDURE — 93979 VASCULAR STUDY: CPT

## 2022-09-30 PROCEDURE — 99214 OFFICE O/P EST MOD 30 MIN: CPT

## 2022-09-30 PROCEDURE — 93970 EXTREMITY STUDY: CPT

## 2022-09-30 NOTE — ASSESSMENT
[FreeTextEntry1] : Patient with left leg edema.\par \par No evidence of DVT on duplex done as an outpatient.  No evidence of arterial insufficiency.\par \par No evidence of DVT, venous insufficiency or central venous compression or thrombosis noted on duplex.\par \par Recommend compression stockings and elevation.\par \par This was all discussed with the patient in detail.

## 2022-10-12 ENCOUNTER — RX RENEWAL (OUTPATIENT)
Age: 67
End: 2022-10-12

## 2022-10-31 ENCOUNTER — APPOINTMENT (OUTPATIENT)
Dept: NEPHROLOGY | Facility: CLINIC | Age: 67
End: 2022-10-31

## 2022-11-10 ENCOUNTER — APPOINTMENT (OUTPATIENT)
Dept: NEPHROLOGY | Facility: CLINIC | Age: 67
End: 2022-11-10

## 2022-11-10 VITALS
BODY MASS INDEX: 29.1 KG/M2 | HEIGHT: 63 IN | HEART RATE: 71 BPM | DIASTOLIC BLOOD PRESSURE: 75 MMHG | SYSTOLIC BLOOD PRESSURE: 160 MMHG | WEIGHT: 164.24 LBS | TEMPERATURE: 97.1 F | OXYGEN SATURATION: 99 %

## 2022-11-10 VITALS — SYSTOLIC BLOOD PRESSURE: 149 MMHG | DIASTOLIC BLOOD PRESSURE: 83 MMHG

## 2022-11-10 PROCEDURE — 99215 OFFICE O/P EST HI 40 MIN: CPT

## 2022-11-10 NOTE — REVIEW OF SYSTEMS
[Fever] : no fever [Chills] : no chills [Feeling Poorly] : not feeling poorly [Feeling Tired] : not feeling tired [Eyesight Problems] : no eyesight problems [Chest Pain] : no chest pain [Lower Ext Edema] : no lower extremity edema [Shortness Of Breath] : no shortness of breath [Wheezing] : no wheezing [Abdominal Pain] : no abdominal pain [Vomiting] : no vomiting [Diarrhea] : no diarrhea [Dysuria] : no dysuria [Incontinence] : no incontinence [Joint Swelling] : no joint swelling [Joint Stiffness] : no joint stiffness [Itching] : no itching [Dizziness] : no dizziness [Fainting] : no fainting [Anxiety] : no anxiety [Depression] : no depression [Easy Bleeding] : no tendency for easy bleeding [Easy Bruising] : no tendency for easy bruising

## 2022-11-10 NOTE — ASSESSMENT
[FreeTextEntry1] : Today I had the pleasure of seeing Magdalena Springer who is a 67 years old woman here for evaluation of resistant hypertension.\par \par Resistant Hypertension -- The patient has a strong family history of early onset hypertension and her sisters have had strokes.  Most secondary causes have been ruled out.  Her blood pressure is now substantially improved on Valsartan HCTZ however still uncontrolled today and is at risk for CV disease.  She has a very renin-angiotensive sensitive hypertension.   After repeating her blood pressure twice today in the office she is still elevated.  I have asked her to increase her valsartan to 160 and continue to monitor home BP daily.  Informed her that she should have her blood work checked at PMD visit next week to monitor potassium.  \par  \par \par f/u in six months

## 2022-11-10 NOTE — PHYSICAL EXAM
[General Appearance - Alert] : alert [General Appearance - In No Acute Distress] : in no acute distress [General Appearance - Well Nourished] : well nourished [General Appearance - Well Developed] : well developed [Sclera] : the sclera and conjunctiva were normal [Hearing Threshold Finger Rub Not Page] : hearing was normal [Examination Of The Oral Cavity] : the lips and gums were normal [Nasal Cavity] : the nasal mucosa and septum were normal [Oropharynx] : the oropharynx was normal [Neck Appearance] : the appearance of the neck was normal [Neck Cervical Mass (___cm)] : no neck mass was observed [Jugular Venous Distention Increased] : there was no jugular-venous distention [Respiration, Rhythm And Depth] : normal respiratory rhythm and effort [Exaggerated Use Of Accessory Muscles For Inspiration] : no accessory muscle use [Auscultation Breath Sounds / Voice Sounds] : lungs were clear to auscultation bilaterally [Heart Sounds] : normal S1 and S2 [Heart Sounds Gallop] : no gallops [Murmurs] : no murmurs [Heart Sounds Pericardial Friction Rub] : no pericardial rub [Edema] : there was no peripheral edema [FreeTextEntry1] : no appreciable edema on my physical examination. [Bowel Sounds] : normal bowel sounds [Abdomen Soft] : soft [Abdomen Tenderness] : non-tender [] : no hepato-splenomegaly [No CVA Tenderness] : no ~M costovertebral angle tenderness [No Spinal Tenderness] : no spinal tenderness [Abnormal Walk] : normal gait [Oriented To Time, Place, And Person] : oriented to person, place, and time [Impaired Insight] : insight and judgment were intact [Affect] : the affect was normal [Mood] : the mood was normal
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
## 2022-11-16 ENCOUNTER — APPOINTMENT (OUTPATIENT)
Dept: INTERNAL MEDICINE | Facility: CLINIC | Age: 67
End: 2022-11-16

## 2022-11-30 ENCOUNTER — LABORATORY RESULT (OUTPATIENT)
Age: 67
End: 2022-11-30

## 2022-11-30 ENCOUNTER — APPOINTMENT (OUTPATIENT)
Dept: INTERNAL MEDICINE | Facility: CLINIC | Age: 67
End: 2022-11-30

## 2022-11-30 ENCOUNTER — OUTPATIENT (OUTPATIENT)
Dept: OUTPATIENT SERVICES | Facility: HOSPITAL | Age: 67
LOS: 1 days | End: 2022-11-30
Payer: MEDICARE

## 2022-11-30 VITALS
OXYGEN SATURATION: 99 % | BODY MASS INDEX: 28.35 KG/M2 | HEART RATE: 65 BPM | SYSTOLIC BLOOD PRESSURE: 128 MMHG | DIASTOLIC BLOOD PRESSURE: 66 MMHG | HEIGHT: 63 IN | WEIGHT: 160 LBS

## 2022-11-30 DIAGNOSIS — F41.8 OTHER SPECIFIED ANXIETY DISORDERS: ICD-10-CM

## 2022-11-30 DIAGNOSIS — I10 ESSENTIAL (PRIMARY) HYPERTENSION: ICD-10-CM

## 2022-11-30 DIAGNOSIS — Z23 ENCOUNTER FOR IMMUNIZATION: ICD-10-CM

## 2022-11-30 DIAGNOSIS — N39.0 URINARY TRACT INFECTION, SITE NOT SPECIFIED: ICD-10-CM

## 2022-11-30 LAB
BILIRUB UR QL STRIP: NORMAL
CLARITY UR: CLEAR
COLLECTION METHOD: NORMAL
GLUCOSE UR-MCNC: NORMAL
HCG UR QL: 0.2 EU/DL
HGB UR QL STRIP.AUTO: NORMAL
KETONES UR-MCNC: NORMAL
LEUKOCYTE ESTERASE UR QL STRIP: NORMAL
NITRITE UR QL STRIP: NORMAL
PH UR STRIP: 5
PROT UR STRIP-MCNC: NORMAL
SP GR UR STRIP: >=1.03

## 2022-11-30 PROCEDURE — G0463: CPT | Mod: 25

## 2022-11-30 PROCEDURE — 80048 BASIC METABOLIC PNL TOTAL CA: CPT

## 2022-11-30 PROCEDURE — 90688 IIV4 VACCINE SPLT 0.5 ML IM: CPT

## 2022-11-30 PROCEDURE — 99214 OFFICE O/P EST MOD 30 MIN: CPT | Mod: GC

## 2022-11-30 PROCEDURE — 81001 URINALYSIS AUTO W/SCOPE: CPT

## 2022-11-30 PROCEDURE — 87086 URINE CULTURE/COLONY COUNT: CPT

## 2022-11-30 PROCEDURE — G0008: CPT

## 2022-11-30 PROCEDURE — 81003 URINALYSIS AUTO W/O SCOPE: CPT

## 2022-11-30 RX ORDER — SIMETHICONE 125 MG/1
125 TABLET, CHEWABLE ORAL
Qty: 120 | Refills: 1 | Status: DISCONTINUED | COMMUNITY
Start: 2022-07-22 | End: 2022-11-30

## 2022-11-30 RX ORDER — AZELASTINE HYDROCHLORIDE 137 UG/1
0.1 SPRAY, METERED NASAL
Qty: 30 | Refills: 0 | Status: DISCONTINUED | COMMUNITY
Start: 2022-04-14 | End: 2022-11-30

## 2022-12-01 LAB
ANION GAP SERPL CALC-SCNC: 12 MMOL/L
BUN SERPL-MCNC: 19 MG/DL
CALCIUM SERPL-MCNC: 10.1 MG/DL
CHLORIDE SERPL-SCNC: 103 MMOL/L
CO2 SERPL-SCNC: 27 MMOL/L
CREAT SERPL-MCNC: 1.07 MG/DL
EGFR: 57 ML/MIN/1.73M2
GLUCOSE SERPL-MCNC: 86 MG/DL
POTASSIUM SERPL-SCNC: 4.2 MMOL/L
SODIUM SERPL-SCNC: 142 MMOL/L
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
## 2022-12-01 NOTE — END OF VISIT
[] : Resident [FreeTextEntry3] : 68yo F with PMhx of anxiety/depression, HTN who presents for HFU after intentional overdose on Xanax. HIstory as above per resident, no further SI and following closely with psychiatry/therapy. Was able to call for help immediately after action. Reports a medication was added in the hospital for BP, however is unsure if this was separate components of her valsartan-HCTZ or new med like amlodipine when I reviewed hx with her. Reporting 2 weeks of dysuria but already received cipro from  about 1 week ago and completed 7 days??? Exam unremarkable. No suprapubic/CVA tenderness. Affect appears normal and reaction. For mood, follows closely w/ psych. HTN appears well-controlled but she needs to call us w/ BP med name and dose. Will obtain UA for urinary sx, POC only w/ blood which is not entirely convincing for UTI so defer abx for now. Close follow-up.

## 2022-12-01 NOTE — ASSESSMENT
[FreeTextEntry1] : Ms. Springer is a 68 yo female with PMH of RA, OA, HTN, HPylori s/p treatment, anxiety and depression, presenting for UTI symptoms and after recent overdose.\par \par #UTI Symptoms \par -POC UA + for blood, otherwise negative\par -Patient with possible failed trial of Cipro\par -Due to symptoms and concern for UTI, sent UA w/ reflex to culture, f/u results\par \par #Depression, Anxiety, Recent overdose\par -No current thoughts of self harm or SI\par -No current Xanax in household, patient contracted to call therapist if develops SI\par -Patient follows with her therapist weekly and psychiatrist monthly\par -Defer medication changes to psychiatry \par \par #Resistant HTN\par -Currently well controlled\par -Nephro requested BMP, lab ordered, f/u results\par -Continue with Valsartan-HCTZ and amlodipine\par -Patient will f/u with correct dosages of medications \par \par #HCM\par -flu shot today\par \par The case was seen and discussed with Dr. Bullard.\par \par Bere Villalobos\par PGY 1

## 2022-12-01 NOTE — PHYSICAL EXAM
[No Edema] : there was no peripheral edema [No Extremity Clubbing/Cyanosis] : no extremity clubbing/cyanosis [Normal] : soft, non-tender, non-distended, no masses palpated, no HSM and normal bowel sounds [No CVA Tenderness] : no CVA  tenderness [Speech Grossly Normal] : speech grossly normal [Memory Grossly Normal] : memory grossly normal [de-identified] : No suprapubic tenderness [de-identified] : Sad mood with congruent affect

## 2022-12-01 NOTE — REVIEW OF SYSTEMS
[Fever] : fever [Chills] : chills [Diarrhea] : diarrhea [Dysuria] : dysuria [Nocturia] : nocturia [Frequency] : frequency [Anxiety] : anxiety [Depression] : depression [Negative] : Respiratory [Chest Pain] : no chest pain [Lower Ext Edema] : no lower extremity edema [Nausea] : no nausea [Vomiting] : no vomiting [Suicidal] : not suicidal [FreeTextEntry8] : urgency, burning

## 2022-12-02 LAB
APPEARANCE: ABNORMAL
BILIRUBIN URINE: NEGATIVE
BLOOD URINE: ABNORMAL
COLOR: ABNORMAL
GLUCOSE QUALITATIVE U: NEGATIVE
KETONES URINE: NEGATIVE
LEUKOCYTE ESTERASE URINE: ABNORMAL
NITRITE URINE: NEGATIVE
PH URINE: 5.5
PROTEIN URINE: NORMAL
SPECIFIC GRAVITY URINE: 1.03
UROBILINOGEN URINE: NORMAL

## 2023-01-19 ENCOUNTER — RX RENEWAL (OUTPATIENT)
Age: 68
End: 2023-01-19

## 2023-03-13 ENCOUNTER — APPOINTMENT (OUTPATIENT)
Dept: INTERNAL MEDICINE | Facility: CLINIC | Age: 68
End: 2023-03-13
Payer: MEDICARE

## 2023-03-13 ENCOUNTER — OUTPATIENT (OUTPATIENT)
Dept: OUTPATIENT SERVICES | Facility: HOSPITAL | Age: 68
LOS: 1 days | End: 2023-03-13
Payer: MEDICARE

## 2023-03-13 VITALS
SYSTOLIC BLOOD PRESSURE: 130 MMHG | HEIGHT: 63 IN | BODY MASS INDEX: 28.7 KG/M2 | DIASTOLIC BLOOD PRESSURE: 80 MMHG | WEIGHT: 162 LBS | HEART RATE: 65 BPM | OXYGEN SATURATION: 99 %

## 2023-03-13 DIAGNOSIS — Z23 ENCOUNTER FOR IMMUNIZATION: ICD-10-CM

## 2023-03-13 DIAGNOSIS — I10 ESSENTIAL (PRIMARY) HYPERTENSION: ICD-10-CM

## 2023-03-13 PROCEDURE — G0009: CPT

## 2023-03-13 PROCEDURE — 83036 HEMOGLOBIN GLYCOSYLATED A1C: CPT

## 2023-03-13 PROCEDURE — G0439: CPT | Mod: GE

## 2023-03-13 PROCEDURE — 90732 PPSV23 VACC 2 YRS+ SUBQ/IM: CPT

## 2023-03-13 PROCEDURE — G0439: CPT | Mod: 25

## 2023-03-13 PROCEDURE — 80061 LIPID PANEL: CPT

## 2023-03-14 PROBLEM — Z23 ENCOUNTER FOR IMMUNIZATION: Status: ACTIVE | Noted: 2021-09-13

## 2023-03-15 LAB
CHOLEST SERPL-MCNC: 250 MG/DL
ESTIMATED AVERAGE GLUCOSE: 114 MG/DL
HBA1C MFR BLD HPLC: 5.6 %
HDLC SERPL-MCNC: 39 MG/DL
LDLC SERPL CALC-MCNC: 136 MG/DL
NONHDLC SERPL-MCNC: 210 MG/DL
TRIGL SERPL-MCNC: 373 MG/DL

## 2023-03-17 NOTE — ASSESSMENT
[FreeTextEntry1] : Patient is a 67F with PMH of RA, OA, HTN, H Pylori s/p bismuth quadruple therapy, anxiety and depression c/b overdose, now presenting for CPE\par \par #Epigastric pain\par - Possible H Pylori re-infection vs. GERD vs. PUD\par - Recommend diet modification - limit spicy foods and other triggering foods\par - repeat H Pylori stool Ag today to check for re-infection\par \par #HTN\par - BP at goal\par - c/w valsartan-HCTZ as prescribed\par \par #Depression\par - continue with ongoing visits with therapist twice weekly over phone and Psychiatrist Dr. Avila monthly\par - No active SI/HI\par \par #HCM\par - colon screening - (FOBT neg 2021) --> [ ] GI referral for colonoscopy given today\par - shingles - (zostavax 2017) --> advised with insurance better to get Shingrix at pharmacy \par - PPSV23 2019 --> prevnar 20 today\par \par RTC for CPE or sooner if acute medical concern\par Case discussed w/ Dr. Connor

## 2023-03-17 NOTE — HISTORY OF PRESENT ILLNESS
[FreeTextEntry1] : epigastric pain [de-identified] : Patient is a 67F with PMH of RA, OA, HTN, H Pylori s/p bismuth quadruple therapy, anxiety and depression c/b overdose, now presenting for CPE.\par \par #Epigastric pain\par Reports epigastric abdominal pain for the past 2 months, with associated bloating, burping, and rare episodes of diarrhea. Reports history of acid reflux. Reports she tried unsuccessfully to limit spicy food but she still eats spicy food regularly which makes it worse.     \par Denies fever, chills, n/v, blood in stool, no dysphagia or difficulty swallowing, no weight loss, She had H pylori June 2022, s/p Bismuth quadruple treatment and cleared in July 2022. \par \par #HTN\par 130/80 today. Takes valsartan-HCTZ as prescribed\par \par #Depression\par Follows with therapist twice weekly over phone and Psychiatrist Dr. Avila monthly\par Denies SI/HI. \par \par #HCM\par - colon screening - (FOBT neg 2021) --> [ ] GI referral for colonoscopy\par - shingles - (zostavax 2017) --> advised with insurance better to get Shingrix at pharmacy \par - PPSV23 2019 --> prevnar 20 today

## 2023-03-17 NOTE — REVIEW OF SYSTEMS
[Abdominal Pain] : abdominal pain [Heartburn] : heartburn [Fever] : no fever [Chills] : no chills [Chest Pain] : no chest pain [Palpitations] : no palpitations [Lower Ext Edema] : no lower extremity edema [Shortness Of Breath] : no shortness of breath [Vomiting] : no vomiting [Melena] : no melena [Skin Rash] : no skin rash [Suicidal] : not suicidal [Easy Bleeding] : no easy bleeding [Easy Bruising] : no easy bruising [FreeTextEntry7] : +epigastric pain

## 2023-03-17 NOTE — PHYSICAL EXAM
[No Acute Distress] : no acute distress [Well Nourished] : well nourished [Well Developed] : well developed [Normal Sclera/Conjunctiva] : normal sclera/conjunctiva [EOMI] : extraocular movements intact [Normal Outer Ear/Nose] : the outer ears and nose were normal in appearance [No JVD] : no jugular venous distention [No Respiratory Distress] : no respiratory distress  [No Accessory Muscle Use] : no accessory muscle use [Clear to Auscultation] : lungs were clear to auscultation bilaterally [Normal Rate] : normal rate  [Regular Rhythm] : with a regular rhythm [Normal S1, S2] : normal S1 and S2 [Pedal Pulses Present] : the pedal pulses are present [No Edema] : there was no peripheral edema [Soft] : abdomen soft [Non-distended] : non-distended [Normal Bowel Sounds] : normal bowel sounds [Normal Posterior Cervical Nodes] : no posterior cervical lymphadenopathy [Normal Anterior Cervical Nodes] : no anterior cervical lymphadenopathy [Grossly Normal Strength/Tone] : grossly normal strength/tone [No Rash] : no rash [No Focal Deficits] : no focal deficits [Normal Affect] : the affect was normal [Normal Insight/Judgement] : insight and judgment were intact [de-identified] : +mild epigastric tenderness to palpation

## 2023-03-17 NOTE — ADDENDUM
[FreeTextEntry1] : 3/17/23: \par Called patient to discuss lab results including lipid panel with hyperlipidemia. Discussed importance of continuing with atorvastatin daily and encouraged exercise and low cholesterol diet.

## 2023-03-21 ENCOUNTER — EMERGENCY (EMERGENCY)
Facility: HOSPITAL | Age: 68
LOS: 1 days | Discharge: HOME | End: 2023-03-21
Attending: STUDENT IN AN ORGANIZED HEALTH CARE EDUCATION/TRAINING PROGRAM
Payer: MEDICARE

## 2023-03-21 ENCOUNTER — NON-APPOINTMENT (OUTPATIENT)
Age: 68
End: 2023-03-21

## 2023-03-21 VITALS
TEMPERATURE: 98 F | OXYGEN SATURATION: 99 % | DIASTOLIC BLOOD PRESSURE: 91 MMHG | WEIGHT: 162.04 LBS | HEART RATE: 66 BPM | HEIGHT: 64 IN | SYSTOLIC BLOOD PRESSURE: 196 MMHG | RESPIRATION RATE: 20 BRPM

## 2023-03-21 VITALS
OXYGEN SATURATION: 99 % | HEART RATE: 75 BPM | SYSTOLIC BLOOD PRESSURE: 166 MMHG | DIASTOLIC BLOOD PRESSURE: 87 MMHG | RESPIRATION RATE: 17 BRPM

## 2023-03-21 LAB
ALBUMIN SERPL ELPH-MCNC: 4.3 G/DL — SIGNIFICANT CHANGE UP (ref 3.3–5)
ALP SERPL-CCNC: 125 U/L — HIGH (ref 40–120)
ALT FLD-CCNC: 17 U/L — SIGNIFICANT CHANGE UP (ref 10–45)
ANION GAP SERPL CALC-SCNC: 11 MMOL/L — SIGNIFICANT CHANGE UP (ref 5–17)
AST SERPL-CCNC: 20 U/L — SIGNIFICANT CHANGE UP (ref 10–40)
BASOPHILS # BLD AUTO: 0 K/UL — SIGNIFICANT CHANGE UP (ref 0–0.2)
BASOPHILS NFR BLD AUTO: 0 % — SIGNIFICANT CHANGE UP (ref 0–2)
BILIRUB SERPL-MCNC: 0.8 MG/DL — SIGNIFICANT CHANGE UP (ref 0.2–1.2)
BUN SERPL-MCNC: 16 MG/DL — SIGNIFICANT CHANGE UP (ref 7–23)
CALCIUM SERPL-MCNC: 9.7 MG/DL — SIGNIFICANT CHANGE UP (ref 8.4–10.5)
CHLORIDE SERPL-SCNC: 101 MMOL/L — SIGNIFICANT CHANGE UP (ref 96–108)
CO2 SERPL-SCNC: 30 MMOL/L — SIGNIFICANT CHANGE UP (ref 22–31)
CREAT SERPL-MCNC: 0.95 MG/DL — SIGNIFICANT CHANGE UP (ref 0.5–1.3)
EGFR: 65 ML/MIN/1.73M2 — SIGNIFICANT CHANGE UP
EOSINOPHIL # BLD AUTO: 0.19 K/UL — SIGNIFICANT CHANGE UP (ref 0–0.5)
EOSINOPHIL NFR BLD AUTO: 1.8 % — SIGNIFICANT CHANGE UP (ref 0–6)
GLUCOSE SERPL-MCNC: 88 MG/DL — SIGNIFICANT CHANGE UP (ref 70–99)
HCT VFR BLD CALC: 42.2 % — SIGNIFICANT CHANGE UP (ref 34.5–45)
HGB BLD-MCNC: 13.4 G/DL — SIGNIFICANT CHANGE UP (ref 11.5–15.5)
LYMPHOCYTES # BLD AUTO: 2.7 K/UL — SIGNIFICANT CHANGE UP (ref 1–3.3)
LYMPHOCYTES # BLD AUTO: 25.4 % — SIGNIFICANT CHANGE UP (ref 13–44)
MANUAL SMEAR VERIFICATION: SIGNIFICANT CHANGE UP
MCHC RBC-ENTMCNC: 27.3 PG — SIGNIFICANT CHANGE UP (ref 27–34)
MCHC RBC-ENTMCNC: 31.8 GM/DL — LOW (ref 32–36)
MCV RBC AUTO: 86.1 FL — SIGNIFICANT CHANGE UP (ref 80–100)
MONOCYTES # BLD AUTO: 0.65 K/UL — SIGNIFICANT CHANGE UP (ref 0–0.9)
MONOCYTES NFR BLD AUTO: 6.1 % — SIGNIFICANT CHANGE UP (ref 2–14)
NEUTROPHILS # BLD AUTO: 6.99 K/UL — SIGNIFICANT CHANGE UP (ref 1.8–7.4)
NEUTROPHILS NFR BLD AUTO: 65.8 % — SIGNIFICANT CHANGE UP (ref 43–77)
PLAT MORPH BLD: NORMAL — SIGNIFICANT CHANGE UP
PLATELET # BLD AUTO: 277 K/UL — SIGNIFICANT CHANGE UP (ref 150–400)
POTASSIUM SERPL-MCNC: 3.7 MMOL/L — SIGNIFICANT CHANGE UP (ref 3.5–5.3)
POTASSIUM SERPL-SCNC: 3.7 MMOL/L — SIGNIFICANT CHANGE UP (ref 3.5–5.3)
PROT SERPL-MCNC: 7.9 G/DL — SIGNIFICANT CHANGE UP (ref 6–8.3)
RBC # BLD: 4.9 M/UL — SIGNIFICANT CHANGE UP (ref 3.8–5.2)
RBC # FLD: 13.1 % — SIGNIFICANT CHANGE UP (ref 10.3–14.5)
RBC BLD AUTO: NORMAL — SIGNIFICANT CHANGE UP
SODIUM SERPL-SCNC: 142 MMOL/L — SIGNIFICANT CHANGE UP (ref 135–145)
VARIANT LYMPHS # BLD: 0.9 % — SIGNIFICANT CHANGE UP (ref 0–6)
WBC # BLD: 10.63 K/UL — HIGH (ref 3.8–10.5)
WBC # FLD AUTO: 10.63 K/UL — HIGH (ref 3.8–10.5)

## 2023-03-21 PROCEDURE — 99284 EMERGENCY DEPT VISIT MOD MDM: CPT

## 2023-03-21 PROCEDURE — 80053 COMPREHEN METABOLIC PANEL: CPT

## 2023-03-21 PROCEDURE — 93005 ELECTROCARDIOGRAM TRACING: CPT

## 2023-03-21 PROCEDURE — 99283 EMERGENCY DEPT VISIT LOW MDM: CPT | Mod: 25

## 2023-03-21 PROCEDURE — 85025 COMPLETE CBC W/AUTO DIFF WBC: CPT

## 2023-03-21 NOTE — ED ADULT NURSE NOTE - EENT ASSESSMENT, MLM
----- Message from Jamin Leon DO sent at 11/13/2017  7:25 AM CST -----  Notify patient thyroid testing normal. Recheck TSH free T4/CMP/CBC in 6 months.  
- - -

## 2023-03-21 NOTE — ED PROVIDER NOTE - NSFOLLOWUPINSTRUCTIONS_ED_ALL_ED_FT
- Follow up with primary care doctor in 5-7 days.     Hypertension    WHAT YOU NEED TO KNOW:    Hypertension is high blood pressure. Your blood pressure is the force of your blood moving against the walls of your arteries. Hypertension causes your blood pressure to get so high that your heart has to work much harder than normal. This can damage your heart. The cause of hypertension may not be known. This is called essential or primary hypertension. Hypertension caused by another medical condition, such as kidney disease, is called secondary hypertension.    DISCHARGE INSTRUCTIONS:    Call your local emergency number (911 in the [place]) or have someone call if:    You have chest pain.    You have any of the following signs of a heart attack:  Squeezing, pressure, or pain in your chest    You may also have any of the following:  Discomfort or pain in your back, neck, jaw, stomach, or arm    Shortness of breath    Nausea or vomiting    Lightheadedness or a sudden cold sweat    You become confused or have trouble speaking.    You suddenly feel lightheaded or have trouble breathing.  Return to the emergency department if:    You have a severe headache or vision loss.    You have weakness in an arm or leg.  Call your doctor or cardiologist if:    You feel faint, dizzy, confused, or drowsy.    You have been taking your blood pressure medicine but your pressure is higher than your provider says it should be.    You have questions or concerns about your condition or care.  Medicines: You may need any of the following:    Antihypertensives may be used to help lower your blood pressure. Several kinds of medicines are available. Your healthcare provider will choose medicines based on the kind of hypertension you have. You may need more than one type of medicine. Take the medicine exactly as directed.    Diuretics help decrease extra fluid that collects in your body. This will help lower your blood pressure. You may urinate more often while you take this medicine.    Cholesterol medicine helps lower your cholesterol level. A low cholesterol level helps prevent heart disease and makes it easier to control your blood pressure.    Take your medicine as directed. Contact your healthcare provider if you think your medicine is not helping or if you have side effects. Tell him or her if you are allergic to any medicine. Keep a list of the medicines, vitamins, and herbs you take. Include the amounts, and when and why you take them. Bring the list or the pill bottles to follow-up visits. Carry your medicine list with you in case of an emergency.  Follow up with your doctor or cardiologist as directed: You will need to return to have your blood pressure checked and to have other lab tests done. Write down your questions so you remember to ask them during your visits.    Stages of hypertension:  Blood Pressure Readings    Normal blood pressure is 119/79 or lower. Your healthcare provider may only check your blood pressure each year if it stays at a normal level.    Elevated blood pressure is 120/79 to 129/79. This is sometimes called prehypertension. Your healthcare provider may suggest lifestyle changes to help lower your blood pressure to a normal level. He or she may then check it again in 3 to 6 months.    Stage 1 hypertension is 130/80 to 139/89. Your provider may recommend lifestyle changes, medication, and checks every 3 to 6 months until your blood pressure is controlled.    Stage 2 hypertension is 140/90 or higher. Your provider will recommend lifestyle changes and have you take 2 kinds of hypertension medicines. You will also need to have your blood pressure checked monthly until it is controlled.  Manage hypertension:    Check your blood pressure at home. Avoid smoking, caffeine, and exercise at least 30 minutes before checking your blood pressure. Sit and rest for 5 minutes before you take your blood pressure. Extend your arm and support it on a flat surface. Your arm should be at the same level as your heart. Follow the directions that came with your blood pressure monitor. Check your blood pressure 2 times, 1 minute apart, before you take your medicine in the morning. Also check your blood pressure before your evening meal. Keep a record of your readings and bring it to your follow-up visits. Ask your healthcare provider what your blood pressure should be.  How to take a Blood Pressure      Manage any other health conditions you have. Health conditions such as diabetes can increase your risk for hypertension. Follow your healthcare provider's instructions and take all your medicines as directed.    Ask about all medicines. Certain medicines can increase your blood pressure. Examples include oral birth control pills, decongestants, herbal supplements, and NSAIDs, such as ibuprofen. Your healthcare provider can tell you which medicines are safe for you to take. This includes prescription and over-the-counter medicines.  Lifestyle changes you can make to manage hypertension: Your healthcare provider may recommend you work with a team to manage hypertension. The team may include medical experts such as a dietitian, an exercise or physical therapist, and a behavior therapist. Your family members may be included in helping you create lifestyle changes.  Ways to Lower Your Blood Pressure    Limit sodium (salt) as directed. Too much sodium can affect your fluid balance. Check labels to find low-sodium or no-salt-added foods. Some low-sodium foods use potassium salts for flavor. Too much potassium can also cause health problems. Your healthcare provider will tell you how much sodium and potassium are safe for you to have in a day. He or she may recommend that you limit sodium to 2,300 mg a day.        Follow the meal plan recommended by your healthcare provider. A dietitian or your provider can give you more information on low-sodium plans or the DASH (Dietary Approaches to Stop Hypertension) eating plan. The DASH plan is low in sodium, processed sugar, unhealthy fats, and total fat. It is high in potassium, calcium, and fiber. These can be found in vegetables, fruit, and whole-grain foods.        Be physically active throughout the day. Physical activity, such as exercise, can help control your blood pressure and your weight. Be physically active for at least 30 minutes per day, on most days of the week. Include aerobic activity, such as walking or riding a bicycle. Also include strength training at least 2 times each week. Your healthcare providers can help you create a physical activity plan.  Ways to Be Physically Active  Strength Training for Adults      Decrease stress. This may help lower your blood pressure. Learn ways to relax, such as deep breathing or listening to music.    Limit alcohol as directed. Alcohol can increase your blood pressure. A drink of alcohol is 12 ounces of beer, 5 ounces of wine, or 1½ ounces of liquor.    Do not smoke. Nicotine and other chemicals in cigarettes and cigars can increase your blood pressure and also cause lung damage. Ask your healthcare provider for information if you currently smoke and need help to quit. E-cigarettes or smokeless tobacco still contain nicotine. Talk to your healthcare provider before you use these products.  Prevent Heart Disease

## 2023-03-21 NOTE — ED PROVIDER NOTE - ATTENDING CONTRIBUTION TO CARE
I, Armando Brady, performed a history and physical exam of the patient and discussed their management with the resident and /or advanced care provider. I reviewed the resident and /or ACP's note and agree with the documented findings and plan of care. I was present and available for all procedures.    67-year-old female with PMH of HTN who presents with multiple elevated BP readings with the highest being systolic of 190s.  Patient dates that early in the morning she did have a slight headache when she took the blood pressure but over the course of the day the headache has decided and otherwise has no other acute symptoms.  Patient denies any chest pain, shortness of breath, abdominal pain, N/V/C/D.  Patient also denies any numbness/tingling or any weakness in the upper or lower extremities.    On exam patient is non-toxic appearing in no acute distress.  Heart is regular rate and rhythm with no murmurs. Lungs are clear bilaterally without respiratory distress or tachypnea. No focal abdominal tenderness, non-distended, no rebound or guarding and no CVA tenderness.    Presentation concerning for elevated BP readings without any acute complaints at this time.  Patient's labs are largely unremarkable with EKG not showing any acute findings.  At this time patient medically stable and safe for discharge, patient amenable to plan and told to follow-up with primary care doctor regarding BP med regimen.

## 2023-03-21 NOTE — ED PROVIDER NOTE - CLINICAL SUMMARY MEDICAL DECISION MAKING FREE TEXT BOX
Jacinda HUERTA PGY-3: -year-old female history of hypertension on valsartan/hydrochlorothiazide, compliant with medications presenting with asymptomatic hypertension.  Patient states home BP between 117–190.  No visual changes, headache, chest pain, shortness of breath, or changes in urination to suggest endorgan injury.  Will obtain CBC and CMP to eval.  We will also obtain EKG.   BP here 160/87.  Patient well-appearing, vital signs stable.  If work-up within normal limits, will discharge with outpatient follow-up for medication optimization.

## 2023-03-21 NOTE — ED ADULT NURSE NOTE - OBJECTIVE STATEMENT
First encounter with the pt, pt received from triage with elevated blood pressure with taking home meds. Pt is a/ox4 and verbal. Speech is clear and able to speak in full sentences without distress. Airway is patent with no obstruction nor blocking secretions. Breathing is even and unlabored on room air. Pt has strong pulses palpated bilaterally.  Neuro check is wnl. Full rom. Pt denies chest pain, n/v and sob. No acute distress noted. Pt has call light within the reach of the pt at the bedside. Will continue to monitor the pt.

## 2023-03-21 NOTE — ED PROVIDER NOTE - OBJECTIVE STATEMENT
67-year-old female history of hypertension coming in today for persistent elevated BPs at home. Home readings 117-190s. Patient is denying any visual changes, no chest pain or shortness of breath.  She is also denying any vomiting, nausea, or abdominal pain.  Denying any changes in urination, no increase or decrease in urination, no dysuria  and no hematuria. Pt compliant with med (valsartan 80/HCTZ 12.5). She denies any fevers or chills.

## 2023-03-21 NOTE — ED PROVIDER NOTE - RAPID ASSESSMENT
67-year-old female history of hypertension on valsartan 80 / hydrochlorothiazide 12.5.  Patient is coming in today due to having consistent elevations in her blood pressure over the past week.  Endorsing slight headache without visual changes vomiting numbness or weakness.  No chest pain or back pain.  Patient has been compliant with her medications.  Patient is well-appearing in triage.  Patient was rapidly assessed via a telemedicine and/or role of Quick Triage Doctor; a limited history, physical exam and assessment was performed. The patient will be seen and further evaluated in the main emergency department. The remainder of care and evaluation will be conducted by the primary emergency medicine team. Receiving team will follow up on labs, imaging and serially reassess patient as indicated. All further decisions regarding patient care, evaluation and disposition are at the discretion of the receiving primary emergency department team. 67-year-old female history of hypertension on valsartan 80 / hydrochlorothiazide 12.5.  Patient is coming in today due to having consistent elevations in her blood pressure over the past week.  Endorsing slight headache without visual changes vomiting numbness or weakness.  No chest pain or back pain.  Patient has been compliant with her medications.  Patient is well-appearing in triage.  Patient was rapidly assessed via a telemedicine and/or role of Quick Triage Doctor; a limited history, physical exam and assessment was performed. The patient will be seen and further evaluated in the main emergency department. The remainder of care and evaluation will be conducted by the primary emergency medicine team. Receiving team will follow up on labs, imaging and serially reassess patient as indicated. All further decisions regarding patient care, evaluation and disposition are at the discretion of the receiving primary emergency department team.    PT seen in [facility] as Quick/Traige by PA only. Immediately available for consultation. Full evaluation to be performed once pt is transferred to main ED  Alessandro Mcdonough MD, Facep

## 2023-03-22 DIAGNOSIS — R10.13 EPIGASTRIC PAIN: ICD-10-CM

## 2023-03-22 DIAGNOSIS — Z23 ENCOUNTER FOR IMMUNIZATION: ICD-10-CM

## 2023-03-22 DIAGNOSIS — F41.8 OTHER SPECIFIED ANXIETY DISORDERS: ICD-10-CM

## 2023-03-22 DIAGNOSIS — Z00.00 ENCOUNTER FOR GENERAL ADULT MEDICAL EXAMINATION WITHOUT ABNORMAL FINDINGS: ICD-10-CM

## 2023-03-22 NOTE — ED CDU PROVIDER NOTE - GASTROINTESTINAL NEGATIVE STATEMENT, MLM
Detail Level: Simple Detail Level: Detailed no abdominal pain, no bloating, no constipation, no diarrhea, no nausea and no vomiting.

## 2023-04-04 ENCOUNTER — APPOINTMENT (OUTPATIENT)
Dept: NEPHROLOGY | Facility: CLINIC | Age: 68
End: 2023-04-04
Payer: MEDICARE

## 2023-04-04 VITALS
OXYGEN SATURATION: 99 % | DIASTOLIC BLOOD PRESSURE: 74 MMHG | SYSTOLIC BLOOD PRESSURE: 135 MMHG | WEIGHT: 161.38 LBS | TEMPERATURE: 97.6 F | HEART RATE: 64 BPM | BODY MASS INDEX: 28.59 KG/M2 | HEIGHT: 63 IN

## 2023-04-04 VITALS — DIASTOLIC BLOOD PRESSURE: 84 MMHG | SYSTOLIC BLOOD PRESSURE: 135 MMHG

## 2023-04-04 VITALS — SYSTOLIC BLOOD PRESSURE: 133 MMHG | DIASTOLIC BLOOD PRESSURE: 84 MMHG

## 2023-04-04 PROCEDURE — 99214 OFFICE O/P EST MOD 30 MIN: CPT

## 2023-04-04 RX ORDER — VALSARTAN AND HYDROCHLOROTHIAZIDE 160; 12.5 MG/1; MG/1
160-12.5 TABLET, FILM COATED ORAL DAILY
Qty: 30 | Refills: 3 | Status: DISCONTINUED | COMMUNITY
Start: 2019-11-14 | End: 2023-04-04

## 2023-04-04 RX ORDER — ALPRAZOLAM 0.5 MG/1
0.5 TABLET ORAL 3 TIMES DAILY
Refills: 0 | Status: DISCONTINUED | COMMUNITY
Start: 2017-10-17 | End: 2023-04-04

## 2023-04-04 NOTE — PHYSICAL EXAM
[General Appearance - Alert] : alert [General Appearance - In No Acute Distress] : in no acute distress [General Appearance - Well Nourished] : well nourished [General Appearance - Well Developed] : well developed [Sclera] : the sclera and conjunctiva were normal [Hearing Threshold Finger Rub Not [place]] : hearing was normal [Neck Appearance] : the appearance of the neck was normal [Neck Cervical Mass (___cm)] : no neck mass was observed [Jugular Venous Distention Increased] : there was no jugular-venous distention [] : no respiratory distress [Respiration, Rhythm And Depth] : normal respiratory rhythm and effort [Exaggerated Use Of Accessory Muscles For Inspiration] : no accessory muscle use [Auscultation Breath Sounds / Voice Sounds] : lungs were clear to auscultation bilaterally [Heart Sounds] : normal S1 and S2 [Heart Sounds Gallop] : no gallops [Murmurs] : no murmurs [Heart Sounds Pericardial Friction Rub] : no pericardial rub [Edema] : there was no peripheral edema [No CVA Tenderness] : no ~M costovertebral angle tenderness [No Spinal Tenderness] : no spinal tenderness [Abnormal Walk] : normal gait [Oriented To Time, Place, And Person] : oriented to person, place, and time [Impaired Insight] : insight and judgment were intact [Affect] : the affect was normal [Mood] : the mood was normal

## 2023-04-04 NOTE — ASSESSMENT
[FreeTextEntry1] : Today I had the pleasure of seeing Magdalena Springer who is a 67 years old woman here for evaluation of hypertension.\par \par Hypertension -- The patient has a strong family history of early onset hypertension and her sisters have had strokes.  Secondary causes have been ruled out.  Her blood pressure is now substantially improved on Valsartan HCTZ however still uncontrolled today and is at risk for CV disease.  Upon review of her BP log at home she is trending 130 to 140 sometimes going to the 170s (although rarely).  Blood work from hospital reviewed.  Ultimately her blood pressure goal is < 130.  Therefore I have asked her to increase her medication from valsartan/hctz 80/12.5 to valsartan/hctz 160/12.5.  Patient will continue to monitor home BP log and notify me for changes.\par  \par \par f/u in six months\par \par The time spent is inclusive of the time it took to see, evaluate, and manage the patient.  Time is inclusive of the time taken to review chart, reconcile medications, document findings, and communicate with other providers (when applicable).\par
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
## 2023-04-21 ENCOUNTER — RESULT REVIEW (OUTPATIENT)
Age: 68
End: 2023-04-21

## 2023-04-21 DIAGNOSIS — Z12.39 ENCOUNTER FOR OTHER SCREENING FOR MALIGNANT NEOPLASM OF BREAST: ICD-10-CM

## 2023-04-22 ENCOUNTER — RX RENEWAL (OUTPATIENT)
Age: 68
End: 2023-04-22

## 2023-04-24 ENCOUNTER — APPOINTMENT (OUTPATIENT)
Dept: INTERNAL MEDICINE | Facility: CLINIC | Age: 68
End: 2023-04-24
Payer: MEDICARE

## 2023-04-24 ENCOUNTER — OUTPATIENT (OUTPATIENT)
Dept: OUTPATIENT SERVICES | Facility: HOSPITAL | Age: 68
LOS: 1 days | End: 2023-04-24
Payer: MEDICARE

## 2023-04-24 VITALS
OXYGEN SATURATION: 99 % | HEIGHT: 63 IN | HEART RATE: 84 BPM | SYSTOLIC BLOOD PRESSURE: 126 MMHG | DIASTOLIC BLOOD PRESSURE: 80 MMHG

## 2023-04-24 DIAGNOSIS — I10 ESSENTIAL (PRIMARY) HYPERTENSION: ICD-10-CM

## 2023-04-24 DIAGNOSIS — E78.5 HYPERLIPIDEMIA, UNSPECIFIED: ICD-10-CM

## 2023-04-24 DIAGNOSIS — F41.8 OTHER SPECIFIED ANXIETY DISORDERS: ICD-10-CM

## 2023-04-24 PROCEDURE — G0463: CPT

## 2023-04-24 PROCEDURE — 99213 OFFICE O/P EST LOW 20 MIN: CPT | Mod: GE

## 2023-04-25 DIAGNOSIS — R10.13 EPIGASTRIC PAIN: ICD-10-CM

## 2023-04-25 DIAGNOSIS — E78.5 HYPERLIPIDEMIA, UNSPECIFIED: ICD-10-CM

## 2023-04-25 DIAGNOSIS — F41.8 OTHER SPECIFIED ANXIETY DISORDERS: ICD-10-CM

## 2023-04-25 NOTE — HISTORY OF PRESENT ILLNESS
[FreeTextEntry1] : Abdominal Discomfort  [de-identified] : 67Y F w/ HTN, HLD, Depression, Anxiety presents for routine follow up. \par \par #Epigastric discomfort \par Endorses intermittent non-radiating, 8/10, epigastric discomfort that is worse with eating (especially spicy food) and improves with fasting and is associated with bloating. Recent H. pylori testing negative. Pt has resolved as of the past 2 weeks. \par \par #HTN \par Recent ED visit for HTN ( @ home); repeat in ED with SBP < 140. Since ED visit, no change in medication and BP (< 140/80). Pt asymptomatic, no headache, nausea, vomiting, CP, palpitations, or SOB. \par \par #HLD\par C/w atorvastatin 80 mg QD\par \par #Depression\par Visits psychiatrist monthly and on escitalopram 20 mg QD. Endorses improved mood and no acute complaints. \par \par #HCM \par - Mammogram referral\par - Colonoscopy referral \par - GYN referral

## 2023-04-25 NOTE — PLAN
[FreeTextEntry1] : 67Y F w/ HTN, HLD, Depression, Anxiety presents for routine follow up. \par \par #Epigastric discomfort \par Resolved 2 weeks ago\par - Pepcid 20 mg PRN\par \par #HTN \par BP: 126/80\par - C/w home valsartan-HCTZ\par \par #HLD\par - C/w atorvastatin 80 mg QD\par \par #Depression\par - Visits psychiatrist monthly and on escitalopram 20 mg QD.\par \par #HCM \par - Mammogram referral\par - Colonoscopy referral \par - GYN referral \par \par RTC PRN \par Case discussed w/ Dr. Scott

## 2023-05-10 ENCOUNTER — APPOINTMENT (OUTPATIENT)
Dept: OBGYN | Facility: CLINIC | Age: 68
End: 2023-05-10
Payer: MEDICARE

## 2023-05-10 VITALS
DIASTOLIC BLOOD PRESSURE: 81 MMHG | SYSTOLIC BLOOD PRESSURE: 163 MMHG | WEIGHT: 160 LBS | BODY MASS INDEX: 28.35 KG/M2 | HEIGHT: 63 IN

## 2023-05-10 DIAGNOSIS — N81.10 CYSTOCELE, UNSPECIFIED: ICD-10-CM

## 2023-05-10 DIAGNOSIS — Z01.419 ENCOUNTER FOR GYNECOLOGICAL EXAMINATION (GENERAL) (ROUTINE) W/OUT ABNORMAL FINDINGS: ICD-10-CM

## 2023-05-10 PROCEDURE — 99387 INIT PM E/M NEW PAT 65+ YRS: CPT

## 2023-05-10 NOTE — PLAN
[FreeTextEntry1] : Pap done-cessastion of screening\par Has mammo appointment\par Cystocele - patient offered UROGYN appointment to address as well as urgency\par She will hold off at this time

## 2023-05-10 NOTE — PHYSICAL EXAM
[Chaperone Declined] : Patient declined chaperone [Appropriately responsive] : appropriately responsive [Alert] : alert [No Acute Distress] : no acute distress [No Lymphadenopathy] : no lymphadenopathy [Soft] : soft [Non-tender] : non-tender [Non-distended] : non-distended [No HSM] : No HSM [No Lesions] : no lesions [No Mass] : no mass [Oriented x3] : oriented x3 [Examination Of The Breasts] : a normal appearance [No Masses] : no breast masses were palpable [Labia Majora] : normal [Labia Minora] : normal [Cystocele] : a cystocele [Normal] : normal [Uterine Adnexae] : normal

## 2023-05-10 NOTE — HISTORY OF PRESENT ILLNESS
[FreeTextEntry1] : Patient is complaining of some urinary frequency and urgency  [Patient reported mammogram was normal] : Patient reported mammogram was normal [Patient reported PAP Smear was normal] : Patient reported PAP Smear was normal [Mammogramdate] : yearly  [PapSmeardate] : 2022  [TextBox_43] : UTD

## 2023-05-15 ENCOUNTER — RESULT REVIEW (OUTPATIENT)
Age: 68
End: 2023-05-15

## 2023-05-15 ENCOUNTER — OUTPATIENT (OUTPATIENT)
Dept: OUTPATIENT SERVICES | Facility: HOSPITAL | Age: 68
LOS: 1 days | End: 2023-05-15
Payer: MEDICARE

## 2023-05-15 ENCOUNTER — APPOINTMENT (OUTPATIENT)
Dept: MAMMOGRAPHY | Facility: IMAGING CENTER | Age: 68
End: 2023-05-15
Payer: MEDICARE

## 2023-05-15 DIAGNOSIS — Z00.8 ENCOUNTER FOR OTHER GENERAL EXAMINATION: ICD-10-CM

## 2023-05-15 LAB — CYTOLOGY CVX/VAG DOC THIN PREP: NORMAL

## 2023-05-15 PROCEDURE — 77063 BREAST TOMOSYNTHESIS BI: CPT | Mod: 26

## 2023-05-15 PROCEDURE — 77067 SCR MAMMO BI INCL CAD: CPT

## 2023-05-15 PROCEDURE — 77067 SCR MAMMO BI INCL CAD: CPT | Mod: 26

## 2023-05-15 PROCEDURE — 77063 BREAST TOMOSYNTHESIS BI: CPT

## 2023-07-26 NOTE — PATIENT PROFILE ADULT - NSPROGENSOURCEINFO_GEN_A_NUR
Paperwork was faxed over 7/26/2023 from [employer] to obtain notes and information about the patient's HbA1C.   patient

## 2023-08-03 ENCOUNTER — RX RENEWAL (OUTPATIENT)
Age: 68
End: 2023-08-03

## 2023-08-30 NOTE — HISTORY OF PRESENT ILLNESS
- Continue warm compresses/soaks  - Start applying topical clindamycin gel to affected areas  - Needs to work on weight loss  - Use hibiclens solution for prevention  - If not improving, needs to see derm   [FreeTextEntry1] : f/u visit and form  [de-identified] : 63 yr-old F with resistant HTN, HLD, b/l knee arithritis, depression/anxiety. Pt got flu shot [facility]  /80; pt complaining of chronic dry cough.  NO URI sx's, no postnasal drip, no GERD sx's; likely 2/2 lisinopril.  \par \par Came to have DMV form filled out for parking permit; unable to walk 200 feet w/out stopping.  Uses cane to ambulate for chronic bilateral knee pain.

## 2023-09-05 ENCOUNTER — OUTPATIENT (OUTPATIENT)
Dept: OUTPATIENT SERVICES | Facility: HOSPITAL | Age: 68
LOS: 1 days | End: 2023-09-05
Payer: MEDICARE

## 2023-09-05 ENCOUNTER — APPOINTMENT (OUTPATIENT)
Dept: GASTROENTEROLOGY | Facility: HOSPITAL | Age: 68
End: 2023-09-05
Payer: MEDICARE

## 2023-09-05 VITALS
WEIGHT: 162 LBS | RESPIRATION RATE: 14 BRPM | HEIGHT: 63 IN | TEMPERATURE: 97.7 F | DIASTOLIC BLOOD PRESSURE: 91 MMHG | BODY MASS INDEX: 28.7 KG/M2 | SYSTOLIC BLOOD PRESSURE: 177 MMHG | HEART RATE: 60 BPM

## 2023-09-05 DIAGNOSIS — K59.00 CONSTIPATION, UNSPECIFIED: ICD-10-CM

## 2023-09-05 DIAGNOSIS — R10.9 UNSPECIFIED ABDOMINAL PAIN: ICD-10-CM

## 2023-09-05 DIAGNOSIS — K25.9 GASTRIC ULCER, UNSPECIFIED AS ACUTE OR CHRONIC, W/OUT HEMORRHAGE OR PERFORATION: ICD-10-CM

## 2023-09-05 DIAGNOSIS — A04.8 OTHER SPECIFIED BACTERIAL INTESTINAL INFECTIONS: ICD-10-CM

## 2023-09-05 PROCEDURE — 99204 OFFICE O/P NEW MOD 45 MIN: CPT | Mod: GC

## 2023-09-05 PROCEDURE — G0463: CPT

## 2023-09-07 ENCOUNTER — APPOINTMENT (OUTPATIENT)
Dept: NEPHROLOGY | Facility: CLINIC | Age: 68
End: 2023-09-07
Payer: MEDICARE

## 2023-09-07 VITALS — DIASTOLIC BLOOD PRESSURE: 84 MMHG | SYSTOLIC BLOOD PRESSURE: 179 MMHG

## 2023-09-07 VITALS
WEIGHT: 163.14 LBS | SYSTOLIC BLOOD PRESSURE: 193 MMHG | BODY MASS INDEX: 28.91 KG/M2 | HEART RATE: 69 BPM | OXYGEN SATURATION: 97 % | HEIGHT: 63 IN | DIASTOLIC BLOOD PRESSURE: 104 MMHG

## 2023-09-07 PROCEDURE — 99215 OFFICE O/P EST HI 40 MIN: CPT

## 2023-09-07 RX ORDER — FAMOTIDINE 20 MG/1
20 TABLET, FILM COATED ORAL
Qty: 60 | Refills: 0 | Status: DISCONTINUED | COMMUNITY
Start: 2023-04-24 | End: 2023-09-07

## 2023-09-07 RX ORDER — TRAZODONE HYDROCHLORIDE 150 MG/1
150 TABLET ORAL
Refills: 0 | Status: DISCONTINUED | COMMUNITY
Start: 2017-10-17 | End: 2023-09-07

## 2023-09-07 RX ORDER — ESCITALOPRAM OXALATE 20 MG/1
20 TABLET ORAL
Qty: 30 | Refills: 0 | Status: DISCONTINUED | COMMUNITY
Start: 2022-06-03 | End: 2023-09-07

## 2023-09-07 RX ORDER — VALSARTAN AND HYDROCHLOROTHIAZIDE 80; 12.5 MG/1; MG/1
80-12.5 TABLET, FILM COATED ORAL
Qty: 90 | Refills: 0 | Status: DISCONTINUED | COMMUNITY
Start: 2023-04-22 | End: 2023-09-07

## 2023-09-07 RX ORDER — BISACODYL 5 MG/1
5 TABLET ORAL
Qty: 2 | Refills: 0 | Status: DISCONTINUED | COMMUNITY
Start: 2023-09-05 | End: 2023-09-07

## 2023-09-07 RX ORDER — POLYETHYLENE GLYCOL 3350 17 G/17G
17 POWDER, FOR SOLUTION ORAL
Qty: 1 | Refills: 0 | Status: DISCONTINUED | COMMUNITY
Start: 2023-09-05 | End: 2023-09-07

## 2023-09-07 RX ORDER — LIDOCAINE 40 MG/G
4 PATCH TOPICAL DAILY
Qty: 30 | Refills: 0 | Status: DISCONTINUED | COMMUNITY
Start: 2021-09-13 | End: 2023-09-07
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
## 2023-09-07 NOTE — REVIEW OF SYSTEMS
[Fever] : no fever [Chills] : no chills [Feeling Poorly] : not feeling poorly [Feeling Tired] : not feeling tired [Eyesight Problems] : no eyesight problems [Chest Pain] : no chest pain [Lower Ext Edema] : no lower extremity edema [Shortness Of Breath] : no shortness of breath [Wheezing] : no wheezing [Abdominal Pain] : no abdominal pain [Vomiting] : no vomiting [Diarrhea] : no diarrhea [Dysuria] : no dysuria [Incontinence] : no incontinence [Joint Swelling] : no joint swelling [Joint Stiffness] : no joint stiffness [Itching] : no itching [As Noted in HPI] : as noted in HPI [Dizziness] : no dizziness [Fainting] : no fainting [Anxiety] : no anxiety [Depression] : no depression [Easy Bleeding] : no tendency for easy bleeding [Easy Bruising] : no tendency for easy bruising

## 2023-09-07 NOTE — ASSESSMENT
[FreeTextEntry1] : Today I had the pleasure of seeing Magdalena Springer who is a 68 years old woman here for evaluation of hypertension.  Hypertension -- The patient has a strong family history of early onset hypertension and her sisters have had strokes.  Secondary causes have been ruled out.  Her blood pressure was substantially improved on Valsartan HCTZ but is now acutely uncontrolled and at high risk for stroke.  Upon review of her BP log at home she is trending 150 sometimes going to the 170s and 190s at time.  Ultimately her blood pressure goal is < 130.  Therefore I have asked her to increase her medication from valsartan/hctz 160/12.5 to valsartan/hctz 360/12.5.  Patient will continue to monitor home BP log and notify me for changes.  Blood work today.  There are no signs of neurologic compromise at the moment if she develops neurologic deficits like blurry vision, worsening headache she will go directly to the ED.    f/u in six months or sooner.

## 2023-09-07 NOTE — PHYSICAL EXAM
[General Appearance - Alert] : alert [General Appearance - In No Acute Distress] : in no acute distress [General Appearance - Well Nourished] : well nourished [General Appearance - Well Developed] : well developed [Sclera] : the sclera and conjunctiva were normal [Hearing Threshold Finger Rub Not [place]] : hearing was normal [Neck Appearance] : the appearance of the neck was normal [Neck Cervical Mass (___cm)] : no neck mass was observed [Jugular Venous Distention Increased] : there was no jugular-venous distention [] : no respiratory distress [Respiration, Rhythm And Depth] : normal respiratory rhythm and effort [Exaggerated Use Of Accessory Muscles For Inspiration] : no accessory muscle use [Auscultation Breath Sounds / Voice Sounds] : lungs were clear to auscultation bilaterally [Heart Sounds] : normal S1 and S2 [Heart Sounds Gallop] : no gallops [Murmurs] : no murmurs [Heart Sounds Pericardial Friction Rub] : no pericardial rub [Edema] : there was no peripheral edema [No CVA Tenderness] : no ~M costovertebral angle tenderness [No Spinal Tenderness] : no spinal tenderness [Abnormal Walk] : normal gait [Cranial Nerves] : cranial nerves 2-12 were intact [Deep Tendon Reflexes (DTR)] : deep tendon reflexes were 2+ and symmetric [No Focal Deficits] : no focal deficits [FreeTextEntry1] : romberg tested, reflexes tested, strength tested, rapid fine motor tested all normal no signs of focal deficit. [Oriented To Time, Place, And Person] : oriented to person, place, and time [Impaired Insight] : insight and judgment were intact [Affect] : the affect was normal [Mood] : the mood was normal

## 2023-09-08 LAB
ALBUMIN SERPL ELPH-MCNC: 4.1 G/DL
ANION GAP SERPL CALC-SCNC: 11 MMOL/L
BUN SERPL-MCNC: 20 MG/DL
CALCIUM SERPL-MCNC: 9.6 MG/DL
CHLORIDE SERPL-SCNC: 103 MMOL/L
CO2 SERPL-SCNC: 29 MMOL/L
CREAT SERPL-MCNC: 1.03 MG/DL
EGFR: 59 ML/MIN/1.73M2
GLUCOSE SERPL-MCNC: 89 MG/DL
PHOSPHATE SERPL-MCNC: 2.6 MG/DL
POTASSIUM SERPL-SCNC: 3.7 MMOL/L
SODIUM SERPL-SCNC: 143 MMOL/L

## 2023-09-10 NOTE — ASSESSMENT
[FreeTextEntry1] : Patient is a 69 yo F of HTN, HLD, Depression, RA, atopic dermatitis, reported h/o gastric ulcer ~5 yrs prior to presentations and normal colonoscopy both [facility], H pylori s/p treatment with confirmed eradication 6/2022 and presents now for colon cancer screening.  # GERD # Reported h/o gastric ulcer in patient with frequent heartburn and NSAID use, rule out recurrent gastric ulcer # h/o H pylori, rule out intestinal metaplasia # Constipation, occasional  # Colon cancer screening - unclear when last colonoscopy was and findings, patient believes it was at least 5+ years ago, but cannot recall specifics, though does not believe had polyps   Plan - EGD and Colonoscopy risks, benefits and preparation discussed with the patient  - Bowel prep ordered - Avoid spicy food and NSAIDs - Continue home prn stool softeners add Metamucil/Benefiter if becomes more frequent  RTC pending procedure findings  Preliminary note until signed by Attending.  Mechelle Leon MD GI/Hepatology Fellow, PGY7

## 2023-09-10 NOTE — END OF VISIT
[] : Fellow [Time Spent: ___ minutes] : I have spent [unfilled] minutes of time on the encounter. [FreeTextEntry3] : D/w Dr. Leon. 68F with depression, RA and PUD presents to discuss screening colonoscopy. She cannot recall when her last colonoscopy was and no records are available, so reasonable to proceed with screening colonoscopy at this time. Will do EGD at same time to evaluate for ulcers and esophagitis given reported history of PUD and worsening heartburn.

## 2023-09-10 NOTE — HISTORY OF PRESENT ILLNESS
[FreeTextEntry1] : Patient is a 67 yo F of HTN, HLD, Depression, RA, atopic dermatitis, reported h/o gastric ulcer ~5 yrs prior to presentations and normal colonoscopy both [facility], H pylori s/p treatment with confirmed eradication 6/2022 and presents now for colon cancer screening.  Patient denying bloody red/black BMs, no diarrhea, but states she has intermittent episodes of constipation for which she takes stool softener as needed, otherwise has 1 BM without straining at least once every other day. Reports episode of epigastric pain in April, but it resolved. She states she has heartburn frequently for which she takes Tums and attributes it to eating a lot of spicy food, also taking Advil 2 tabs almost every night to help her sleep and takes additional tablets on occasion for knee pain. She denies n/v, f/c, difficulty or painful swallowing. No vomiting of red or black substance. Maternal uncle had cancer, does not recall specific type, but denying fhx of colon cancer.   4/2022 hgb 12.5 with iron studies wnl A1c 5.6 3/2023  The mother had dementia.  There is no other family history of colon cancer, colon polyp, esophageal or peptic ulcer disease, female genitourinary disease, liver disease, cholelithiasis, pancreatic disease or cancer, inflammatory bowel disease or celiac disease.

## 2023-09-10 NOTE — PHYSICAL EXAM
[Alert] : alert [Normal Voice/Communication] : normal voice/communication [Sclera] : the sclera and conjunctiva were normal [Hearing Threshold Finger Rub Not [place]] : hearing was normal [Normal Appearance] : the appearance of the neck was normal [No Respiratory Distress] : no respiratory distress [Abdomen Tenderness] : non-tender [No Masses] : no abdominal mass palpated [Abdomen Soft] : soft [] : no hepatosplenomegaly [Oriented To Time, Place, And Person] : oriented to person, place, and time [de-identified] : Regular rate

## 2023-09-10 NOTE — REASON FOR VISIT
[Initial Evaluation] : an initial evaluation [FreeTextEntry1] : frequent heartburn, colon cancer screening gastric ulcer, H. pylori

## 2023-09-11 DIAGNOSIS — R10.13 EPIGASTRIC PAIN: ICD-10-CM

## 2023-09-11 DIAGNOSIS — A04.8 OTHER SPECIFIED BACTERIAL INTESTINAL INFECTIONS: ICD-10-CM

## 2023-09-11 DIAGNOSIS — K25.9 GASTRIC ULCER, UNSPECIFIED AS ACUTE OR CHRONIC, WITHOUT HEMORRHAGE OR PERFORATION: ICD-10-CM

## 2023-09-11 DIAGNOSIS — K59.00 CONSTIPATION, UNSPECIFIED: ICD-10-CM

## 2023-09-11 DIAGNOSIS — Z12.11 ENCOUNTER FOR SCREENING FOR MALIGNANT NEOPLASM OF COLON: ICD-10-CM

## 2023-09-28 ENCOUNTER — TRANSCRIPTION ENCOUNTER (OUTPATIENT)
Age: 68
End: 2023-09-28

## 2023-09-28 ENCOUNTER — OUTPATIENT (OUTPATIENT)
Dept: OUTPATIENT SERVICES | Facility: HOSPITAL | Age: 68
LOS: 1 days | End: 2023-09-28
Payer: MEDICARE

## 2023-09-28 ENCOUNTER — RESULT REVIEW (OUTPATIENT)
Age: 68
End: 2023-09-28

## 2023-09-28 VITALS
HEART RATE: 73 BPM | RESPIRATION RATE: 13 BRPM | WEIGHT: 160.94 LBS | TEMPERATURE: 97 F | SYSTOLIC BLOOD PRESSURE: 136 MMHG | HEIGHT: 64 IN | OXYGEN SATURATION: 100 % | DIASTOLIC BLOOD PRESSURE: 69 MMHG

## 2023-09-28 VITALS
SYSTOLIC BLOOD PRESSURE: 107 MMHG | DIASTOLIC BLOOD PRESSURE: 60 MMHG | HEART RATE: 79 BPM | RESPIRATION RATE: 17 BRPM | OXYGEN SATURATION: 98 %

## 2023-09-28 DIAGNOSIS — K59.00 CONSTIPATION, UNSPECIFIED: ICD-10-CM

## 2023-09-28 PROCEDURE — 43239 EGD BIOPSY SINGLE/MULTIPLE: CPT

## 2023-09-28 PROCEDURE — 88305 TISSUE EXAM BY PATHOLOGIST: CPT

## 2023-09-28 PROCEDURE — 88305 TISSUE EXAM BY PATHOLOGIST: CPT | Mod: 26

## 2023-09-28 PROCEDURE — 45380 COLONOSCOPY AND BIOPSY: CPT | Mod: 59

## 2023-09-28 PROCEDURE — 45380 COLONOSCOPY AND BIOPSY: CPT | Mod: XS,PT

## 2023-09-28 PROCEDURE — 45385 COLONOSCOPY W/LESION REMOVAL: CPT

## 2023-09-28 PROCEDURE — 45385 COLONOSCOPY W/LESION REMOVAL: CPT | Mod: PT

## 2023-09-28 DEVICE — NET RETRV ROT ROTH 2.5MMX230CM: Type: IMPLANTABLE DEVICE | Status: FUNCTIONAL

## 2023-09-28 RX ORDER — ESCITALOPRAM OXALATE 10 MG/1
1 TABLET, FILM COATED ORAL
Qty: 0 | Refills: 0 | DISCHARGE

## 2023-09-28 RX ORDER — ACETAMINOPHEN 500 MG
2 TABLET ORAL
Qty: 0 | Refills: 0 | DISCHARGE

## 2023-09-28 RX ORDER — OXYCODONE HYDROCHLORIDE 5 MG/1
10 TABLET ORAL ONCE
Refills: 0 | Status: DISCONTINUED | OUTPATIENT
Start: 2023-09-28 | End: 2023-09-28

## 2023-09-28 RX ORDER — AMLODIPINE BESYLATE 2.5 MG/1
1 TABLET ORAL
Refills: 0 | DISCHARGE

## 2023-09-28 RX ORDER — ALPRAZOLAM 0.25 MG
1 TABLET ORAL
Qty: 0 | Refills: 0 | DISCHARGE

## 2023-09-28 RX ORDER — ONDANSETRON 8 MG/1
4 TABLET, FILM COATED ORAL ONCE
Refills: 0 | Status: DISCONTINUED | OUTPATIENT
Start: 2023-09-28 | End: 2023-10-12

## 2023-09-28 RX ORDER — OXYCODONE HYDROCHLORIDE 5 MG/1
5 TABLET ORAL ONCE
Refills: 0 | Status: DISCONTINUED | OUTPATIENT
Start: 2023-09-28 | End: 2023-09-28

## 2023-09-28 RX ORDER — TRAZODONE HCL 50 MG
2 TABLET ORAL
Qty: 0 | Refills: 0 | DISCHARGE

## 2023-09-28 RX ORDER — ATORVASTATIN CALCIUM 80 MG/1
1 TABLET, FILM COATED ORAL
Qty: 30 | Refills: 0

## 2023-09-28 RX ORDER — ATORVASTATIN CALCIUM 80 MG/1
1 TABLET, FILM COATED ORAL
Refills: 0 | DISCHARGE

## 2023-09-28 RX ORDER — ACETAMINOPHEN 500 MG
1000 TABLET ORAL ONCE
Refills: 0 | Status: DISCONTINUED | OUTPATIENT
Start: 2023-09-28 | End: 2023-10-12

## 2023-09-28 RX ORDER — SODIUM CHLORIDE 9 MG/ML
500 INJECTION INTRAMUSCULAR; INTRAVENOUS; SUBCUTANEOUS
Refills: 0 | Status: DISCONTINUED | OUTPATIENT
Start: 2023-09-28 | End: 2023-10-12

## 2023-09-28 NOTE — PRE-ANESTHESIA EVALUATION ADULT - NSANTHOSAYNRD_GEN_A_CORE
No. SOL screening performed.  STOP BANG Legend: 0-2 = LOW Risk; 3-4 = INTERMEDIATE Risk; 5-8 = HIGH Risk

## 2023-09-28 NOTE — ASU PATIENT PROFILE, ADULT - FALL HARM RISK - UNIVERSAL INTERVENTIONS
Bed in lowest position, wheels locked, appropriate side rails in place/Call bell, personal items and telephone in reach/Instruct patient to call for assistance before getting out of bed or chair/Non-slip footwear when patient is out of bed/[place] to call system/Physically safe environment - no spills, clutter or unnecessary equipment/Purposeful Proactive Rounding/Room/bathroom lighting operational, light cord in reach

## 2023-09-28 NOTE — PRE PROCEDURE NOTE - PRE PROCEDURE EVALUATION
Attending Physician: Orestes Aguilera MD    Procedure: EGD/colonoscopy    Indication for Procedure: gastric ulcer history, screening for colon cancer  ________________________________________________________  PAST MEDICAL & SURGICAL HISTORY:  Hyperlipidemia      Hypertension      Uncomplicated asthma, unspecified asthma severity      No significant past surgical history        ALLERGIES:  No Known Allergies    HOME MEDICATIONS:  amLODIPine 5 mg oral tablet: 1 tab(s) orally once a day  escitalopram 10 mg oral tablet: 1 tab(s) orally once a day  Lipitor 80 mg oral tablet: 1 tab(s) orally once a day  multivitamin:   traZODone 150 mg oral tablet: 2 tab(s) orally once a day (at bedtime)  Tylenol 500 mg oral tablet: 2 tab(s) orally , As Needed  valsartan-hydrochlorothiazide 320 mg-12.5 mg oral tablet: 1 tab(s) orally once a day  Xanax 0.5 mg oral tablet: 1 tab(s) orally , As Needed - plane anxiety    AICD/PPM: [ ] yes   [x ] no    PERTINENT LAB DATA:                      PHYSICAL EXAMINATION:    Height (cm): 162.6  Weight (kg): 73  BMI (kg/m2): 27.6  BSA (m2): 1.78T(C): --  HR: --  BP: --  RR: --  SpO2: --    Constitutional: NAD  HEENT: PERRLA, EOMI,    Neck:  No JVD  Respiratory: CTAB/L  Cardiovascular: S1 and S2  Gastrointestinal: BS+, soft, NT/ND  Extremities: No peripheral edema  Neurological: A/O x 3, no focal deficits  Psychiatric: Normal mood, normal affect  Skin: No rashes    ASA Class: I [ ]  II [ x]  III [ ]  IV [ ]    COMMENTS:    The patient is a suitable candidate for the planned procedure unless box checked [ ]  No, explain:

## 2023-09-28 NOTE — PRE-ANESTHESIA EVALUATION ADULT - NSANTHPEFT_GEN_ALL_CORE
Body Location Override (Optional - Billing Will Still Be Based On Selected Body Map Location If Applicable): left upper lip
Detail Level: Detailed
Add 54009 Cpt? (Important Note: In 2017 The Use Of 49393 Is Being Tracked By Cms To Determine Future Global Period Reimbursement For Global Periods): yes
Cardiac: RRR, no m/g/r  Pulm: CTAB

## 2023-10-02 DIAGNOSIS — K29.71 GASTRITIS, UNSPECIFIED, WITH BLEEDING: ICD-10-CM

## 2023-10-02 LAB — SURGICAL PATHOLOGY STUDY: SIGNIFICANT CHANGE UP

## 2023-11-09 ENCOUNTER — RX RENEWAL (OUTPATIENT)
Age: 68
End: 2023-11-09

## 2024-02-02 ENCOUNTER — OUTPATIENT (OUTPATIENT)
Dept: OUTPATIENT SERVICES | Facility: HOSPITAL | Age: 69
LOS: 1 days | End: 2024-02-02
Payer: MEDICARE

## 2024-02-02 ENCOUNTER — APPOINTMENT (OUTPATIENT)
Dept: INTERNAL MEDICINE | Facility: CLINIC | Age: 69
End: 2024-02-02
Payer: MEDICARE

## 2024-02-02 VITALS
WEIGHT: 165 LBS | HEART RATE: 67 BPM | SYSTOLIC BLOOD PRESSURE: 150 MMHG | BODY MASS INDEX: 29.23 KG/M2 | DIASTOLIC BLOOD PRESSURE: 88 MMHG | HEIGHT: 63 IN | OXYGEN SATURATION: 98 %

## 2024-02-02 DIAGNOSIS — I10 ESSENTIAL (PRIMARY) HYPERTENSION: ICD-10-CM

## 2024-02-02 DIAGNOSIS — L60.9 NAIL DISORDER, UNSPECIFIED: ICD-10-CM

## 2024-02-02 DIAGNOSIS — M25.462 EFFUSION, LEFT KNEE: ICD-10-CM

## 2024-02-02 DIAGNOSIS — L85.3 XEROSIS CUTIS: ICD-10-CM

## 2024-02-02 DIAGNOSIS — R51.9 HEADACHE, UNSPECIFIED: ICD-10-CM

## 2024-02-02 DIAGNOSIS — K62.5 HEMORRHAGE OF ANUS AND RECTUM: ICD-10-CM

## 2024-02-02 PROCEDURE — G0463: CPT

## 2024-02-02 PROCEDURE — 99213 OFFICE O/P EST LOW 20 MIN: CPT | Mod: GE

## 2024-02-02 PROCEDURE — 84443 ASSAY THYROID STIM HORMONE: CPT

## 2024-02-02 RX ORDER — AMLODIPINE BESYLATE 5 MG/1
5 TABLET ORAL
Qty: 90 | Refills: 1 | Status: ACTIVE | COMMUNITY
Start: 2024-02-02 | End: 1900-01-01

## 2024-02-03 LAB — TSH SERPL-ACNC: 1.33 UIU/ML

## 2024-02-04 PROBLEM — M25.462 EFFUSION OF LEFT KNEE: Status: ACTIVE | Noted: 2024-02-02

## 2024-02-04 PROBLEM — R51.9 HEADACHE: Status: ACTIVE | Noted: 2017-10-20

## 2024-02-04 PROBLEM — K62.5 RECTAL BLEEDING: Status: ACTIVE | Noted: 2024-02-04

## 2024-02-04 PROBLEM — L85.3 DRY SKIN: Status: ACTIVE | Noted: 2024-02-04

## 2024-02-06 ENCOUNTER — APPOINTMENT (OUTPATIENT)
Dept: ORTHOPEDIC SURGERY | Facility: CLINIC | Age: 69
End: 2024-02-06
Payer: MEDICARE

## 2024-02-06 VITALS — HEIGHT: 63 IN | WEIGHT: 165 LBS | BODY MASS INDEX: 29.23 KG/M2

## 2024-02-06 DIAGNOSIS — M17.11 UNILATERAL PRIMARY OSTEOARTHRITIS, RIGHT KNEE: ICD-10-CM

## 2024-02-06 DIAGNOSIS — M17.12 UNILATERAL PRIMARY OSTEOARTHRITIS, LEFT KNEE: ICD-10-CM

## 2024-02-06 PROCEDURE — 73564 X-RAY EXAM KNEE 4 OR MORE: CPT | Mod: RT

## 2024-02-06 PROCEDURE — 20610 DRAIN/INJ JOINT/BURSA W/O US: CPT | Mod: LT

## 2024-02-06 PROCEDURE — 99204 OFFICE O/P NEW MOD 45 MIN: CPT | Mod: 25

## 2024-02-06 NOTE — END OF VISIT
[] : Resident [FreeTextEntry3] : no deficit on history and exam - sharp HA that have resolved   agree w/ plan

## 2024-02-06 NOTE — DISCUSSION/SUMMARY
[de-identified] : The patient has bilat PF osteoarthritis. They are not an appropriate candidate for surgical intervention at this time. An extensive discussion was conducted on the natural history of the disease and the variety of surgical and non-surgical options available to the patient including, but not limited to non-steroidal anti-inflammatory medications, steroid injections, physical therapy, maintenance of ideal body weight, and reduction of activity. I recommended a prescription of Mobic but the patient would prefer to use OTC NSAIDs at this time. The patient will schedule an appointment as needed.   Informed consent for the left knee injection was obtained. All questions were answered. A time out was performed. The  left knee was prepped and draped in sterile fashion. Using sterile technique, 80mg of Kenalog, 4cc of 1% lidocaine, 4cc of 0.25% marcaine using a 21-gauge needle. A sterile dressing was applied. Post injection instructions were reviewed. The patient tolerated the procedure well.

## 2024-02-06 NOTE — ASSESSMENT
[FreeTextEntry1] : 67 yo F with a history of RA, OA, HTN, H.pylori s/p quad therapy, anxiety, depression, presenting for multiple acute concerns. Patient with resistant HTN, amlodipine 5mg added to HTN regimen today, patient previously tolerated well. Concern for headache with pain radiation to face and family history of stroke. No motor or sensory deficits during or after episode, advised to present to ED if alarm symptoms. Referral made to ortho for L knee effusion, and podiatry for toenail detachment and possible onychomycosis.

## 2024-02-06 NOTE — HISTORY OF PRESENT ILLNESS
[FreeTextEntry8] : 69 yo F with a history of RA, OA, HTN, H.pylori s/p quad therapy, anxiety, depression, presenting for multiple acute concerns.  Patient states that over the past two weeks, she has had two instances where she has felt a severe, primarily L-sided headache. States the pain radiates down the left side of her face. Denies associated sensory or motor loss in the affected area, and denies visual disturbances before, during, or after these episodes. Patient is concerned because of her history of HTN, and the fact that both of her sisters have a history of stroke and they are both younger than her. Checks her BP intermittently on home device, states her highest reading was 198 systolic last week. Has been seeing nephrology for medication optimization of her HTN.  Patient today with L knee swelling and pain. Has needed to have the knee drained in the past, although is unsure if any diagnostic studies were performed. Today notes swelling and mild pain with exertion but denies tenderness. Patient also with concerns of dry skin that is primarily affecting her hands and upper back over the past month. Additionally is concerned with her right big toe nail; patient dropped a heavy water bottle on it two months and described a collection of blood that formed beneath the nail. Had been getting it cut and the dried blood cleaned at intermittent pedicures, but notes it feels the nail is growing back irregularly.

## 2024-02-06 NOTE — PHYSICAL EXAM
[de-identified] : Well developed, well nourished in no apparent distress, awake, alert and orientated to person, place and time. with appropriate mood and affect. Respirations are even and unlabored. Gait evaluation does reveal a limp. There is no inguinal adenopathy. The legs are well-perfused, without skin lesions, shows a grossly normal motor and sensory examination. Knee motion does cause significant pain. ROM of both knees are 0-120 degrees.  5 degrees varus The knees are stable within that range-of-motion to AP and ML stress. Muscle strength is normal. Pedal pulses are palpable. [de-identified] :  Long standing  knee, AP knee, lateral knee, tunnel and patellar views of the both knees were ordered and taken in the office and demonstrate patella-femoral degenerative joint disease of the knee with satisfactory joint space.

## 2024-02-06 NOTE — HISTORY OF PRESENT ILLNESS
[de-identified] : This is a very nice  68 year old  female experiencing  pain in both knees, R>L which is mild-moderate in intensity and has been going on for at least 3 months now. The pain somewhat limits activities of daily living. Walking tolerance is slightly reduced. The patient denies any radiation of the pain to the feet and it is not associated with numbness, tingling, or weakness.

## 2024-02-06 NOTE — REVIEW OF SYSTEMS
[Joint Pain] : joint pain [Joint Swelling] : joint swelling [Itching] : Itching [Negative] : Heme/Lymph [Hair Changes] : no hair changes [Skin Rash] : no skin rash [de-identified] : Skin dryness, primarily face and upper back. Chronically thin hair.

## 2024-02-06 NOTE — PHYSICAL EXAM
[No Acute Distress] : no acute distress [Well Nourished] : well nourished [Well Developed] : well developed [Well-Appearing] : well-appearing [Normal Sclera/Conjunctiva] : normal sclera/conjunctiva [PERRL] : pupils equal round and reactive to light [EOMI] : extraocular movements intact [Normal Oropharynx] : the oropharynx was normal [Normal Outer Ear/Nose] : the outer ears and nose were normal in appearance [No JVD] : no jugular venous distention [No Lymphadenopathy] : no lymphadenopathy [Supple] : supple [Thyroid Normal, No Nodules] : the thyroid was normal and there were no nodules present [No Respiratory Distress] : no respiratory distress  [No Accessory Muscle Use] : no accessory muscle use [Normal Rate] : normal rate  [Clear to Auscultation] : lungs were clear to auscultation bilaterally [Regular Rhythm] : with a regular rhythm [Normal S1, S2] : normal S1 and S2 [No Murmur] : no murmur heard [No Carotid Bruits] : no carotid bruits [No Varicosities] : no varicosities [No Abdominal Bruit] : a ~M bruit was not heard ~T in the abdomen [Pedal Pulses Present] : the pedal pulses are present [No Edema] : there was no peripheral edema [No Palpable Aorta] : no palpable aorta [No Extremity Clubbing/Cyanosis] : no extremity clubbing/cyanosis [Non Tender] : non-tender [Soft] : abdomen soft [Non-distended] : non-distended [No Masses] : no abdominal mass palpated [No HSM] : no HSM [Normal Bowel Sounds] : normal bowel sounds [Normal Anterior Cervical Nodes] : no anterior cervical lymphadenopathy [Normal Posterior Cervical Nodes] : no posterior cervical lymphadenopathy [No CVA Tenderness] : no CVA  tenderness [No Spinal Tenderness] : no spinal tenderness [Grossly Normal Strength/Tone] : grossly normal strength/tone [No Rash] : no rash [Coordination Grossly Intact] : coordination grossly intact [No Focal Deficits] : no focal deficits [Normal Gait] : normal gait [Deep Tendon Reflexes (DTR)] : deep tendon reflexes were 2+ and symmetric [Normal Affect] : the affect was normal [Normal Insight/Judgement] : insight and judgment were intact [de-identified] : Swelling of L knee, non-tender, no erythema, no rash, patellar blotting [de-identified] : No skin changes noted. [de-identified] : No cranial nerve deficits. Motor and sensory function 5/5 in all extremities.

## 2024-02-15 DIAGNOSIS — M25.462 EFFUSION, LEFT KNEE: ICD-10-CM

## 2024-02-15 DIAGNOSIS — K62.5 HEMORRHAGE OF ANUS AND RECTUM: ICD-10-CM

## 2024-02-15 DIAGNOSIS — B35.1 TINEA UNGUIUM: ICD-10-CM

## 2024-02-15 DIAGNOSIS — R51.9 HEADACHE, UNSPECIFIED: ICD-10-CM

## 2024-02-15 DIAGNOSIS — L85.3 XEROSIS CUTIS: ICD-10-CM

## 2024-03-08 ENCOUNTER — APPOINTMENT (OUTPATIENT)
Dept: INTERNAL MEDICINE | Facility: CLINIC | Age: 69
End: 2024-03-08
Payer: MEDICARE

## 2024-03-08 ENCOUNTER — OUTPATIENT (OUTPATIENT)
Dept: OUTPATIENT SERVICES | Facility: HOSPITAL | Age: 69
LOS: 1 days | End: 2024-03-08
Payer: MEDICARE

## 2024-03-08 VITALS
DIASTOLIC BLOOD PRESSURE: 88 MMHG | HEART RATE: 74 BPM | SYSTOLIC BLOOD PRESSURE: 128 MMHG | WEIGHT: 158 LBS | OXYGEN SATURATION: 97 % | HEIGHT: 63 IN | BODY MASS INDEX: 28 KG/M2

## 2024-03-08 DIAGNOSIS — B35.1 TINEA UNGUIUM: ICD-10-CM

## 2024-03-08 DIAGNOSIS — I10 ESSENTIAL (PRIMARY) HYPERTENSION: ICD-10-CM

## 2024-03-08 DIAGNOSIS — R10.13 EPIGASTRIC PAIN: ICD-10-CM

## 2024-03-08 DIAGNOSIS — R41.3 OTHER AMNESIA: ICD-10-CM

## 2024-03-08 PROCEDURE — 85025 COMPLETE CBC W/AUTO DIFF WBC: CPT

## 2024-03-08 PROCEDURE — G0463: CPT

## 2024-03-08 PROCEDURE — 99214 OFFICE O/P EST MOD 30 MIN: CPT | Mod: GC

## 2024-03-08 PROCEDURE — 36415 COLL VENOUS BLD VENIPUNCTURE: CPT

## 2024-03-08 PROCEDURE — 80053 COMPREHEN METABOLIC PANEL: CPT

## 2024-03-08 RX ORDER — PANTOPRAZOLE 40 MG/1
40 TABLET, DELAYED RELEASE ORAL DAILY
Qty: 30 | Refills: 1 | Status: ACTIVE | COMMUNITY
Start: 2024-03-08 | End: 1900-01-01

## 2024-03-10 PROBLEM — R41.3 MEMORY LOSS OR IMPAIRMENT: Status: ACTIVE | Noted: 2021-09-13

## 2024-03-10 PROBLEM — B35.1 ONYCHOMYCOSIS: Status: ACTIVE | Noted: 2024-02-04

## 2024-03-10 PROBLEM — R10.13 EPIGASTRIC PAIN: Status: ACTIVE | Noted: 2023-03-14

## 2024-03-10 RX ORDER — AMLODIPINE BESYLATE 5 MG/1
5 TABLET ORAL DAILY
Qty: 30 | Refills: 2 | Status: DISCONTINUED | COMMUNITY
Start: 2023-09-21 | End: 2024-03-10

## 2024-03-10 RX ORDER — VALSARTAN AND HYDROCHLOROTHIAZIDE 320; 12.5 MG/1; MG/1
320-12.5 TABLET, FILM COATED ORAL DAILY
Qty: 30 | Refills: 1 | Status: DISCONTINUED | COMMUNITY
Start: 2023-01-19 | End: 2024-03-10

## 2024-03-10 RX ORDER — OMEPRAZOLE 40 MG/1
40 CAPSULE, DELAYED RELEASE ORAL
Qty: 30 | Refills: 0 | Status: DISCONTINUED | COMMUNITY
Start: 2023-10-02 | End: 2024-03-10

## 2024-03-12 LAB
ALBUMIN SERPL ELPH-MCNC: 4.2 G/DL
ALP BLD-CCNC: 120 U/L
ALT SERPL-CCNC: 15 U/L
ANION GAP SERPL CALC-SCNC: 14 MMOL/L
AST SERPL-CCNC: 17 U/L
BASOPHILS # BLD AUTO: 0.05 K/UL
BASOPHILS NFR BLD AUTO: 0.4 %
BILIRUB SERPL-MCNC: 0.7 MG/DL
BUN SERPL-MCNC: 20 MG/DL
CALCIUM SERPL-MCNC: 9.9 MG/DL
CHLORIDE SERPL-SCNC: 98 MMOL/L
CO2 SERPL-SCNC: 29 MMOL/L
CREAT SERPL-MCNC: 1.17 MG/DL
EGFR: 51 ML/MIN/1.73M2
EOSINOPHIL # BLD AUTO: 0.1 K/UL
EOSINOPHIL NFR BLD AUTO: 0.8 %
GLUCOSE SERPL-MCNC: 87 MG/DL
HCT VFR BLD CALC: 44.4 %
HGB BLD-MCNC: 14.4 G/DL
IMM GRANULOCYTES NFR BLD AUTO: 0.3 %
LYMPHOCYTES # BLD AUTO: 2.95 K/UL
LYMPHOCYTES NFR BLD AUTO: 24.7 %
MAN DIFF?: NORMAL
MCHC RBC-ENTMCNC: 28 PG
MCHC RBC-ENTMCNC: 32.4 GM/DL
MCV RBC AUTO: 86.2 FL
MONOCYTES # BLD AUTO: 0.72 K/UL
MONOCYTES NFR BLD AUTO: 6 %
NEUTROPHILS # BLD AUTO: 8.06 K/UL
NEUTROPHILS NFR BLD AUTO: 67.8 %
PLATELET # BLD AUTO: 298 K/UL
POTASSIUM SERPL-SCNC: 3.8 MMOL/L
PROT SERPL-MCNC: 7.5 G/DL
RBC # BLD: 5.15 M/UL
RBC # FLD: 13.6 %
SODIUM SERPL-SCNC: 140 MMOL/L
WBC # FLD AUTO: 11.92 K/UL

## 2024-03-12 NOTE — REVIEW OF SYSTEMS
[Recent Change In Weight] : ~T recent weight change [Abdominal Pain] : abdominal pain [Heartburn] : heartburn [Joint Pain] : joint pain [Joint Swelling] : joint swelling [Negative] : Heme/Lymph [Chills] : no chills [Fever] : no fever [Hot Flashes] : no hot flashes [Fatigue] : no fatigue [Nausea] : no nausea [Night Sweats] : no night sweats [Diarrhea] : diarrhea [Constipation] : no constipation [Melena] : no melena [Vomiting] : no vomiting

## 2024-03-12 NOTE — HEALTH RISK ASSESSMENT
[No] : No [No falls in past year] : Patient reported no falls in the past year [Mild] : Severity of Depression is Mild [PHQ-9 Positive] : PHQ-9 Positive [I have developed a follow-up plan documented below in the note.] : I have developed a follow-up plan documented below in the note. [Never] : Never [[id number]] : 9

## 2024-03-12 NOTE — ASSESSMENT
[FreeTextEntry1] : 69 yo F with a history of RA, OA, HTN, H.pylori s/p quad therapy, anxiety, depression, presenting for as a follow-up for multiple acute concerns. Reports significant improvement in knee swelling and pain after injection. Reporting exacerbation in GERD like symptoms along with 7-pound weight loss since visit last month. Recent EGD with chronic gastritis and colonoscopy with multiple polyps removed. Will send for CT A/P to rule out malignancy.

## 2024-03-12 NOTE — PHYSICAL EXAM
[No Acute Distress] : no acute distress [Well Nourished] : well nourished [Well-Appearing] : well-appearing [Well Developed] : well developed [PERRL] : pupils equal round and reactive to light [Normal Sclera/Conjunctiva] : normal sclera/conjunctiva [Normal Outer Ear/Nose] : the outer ears and nose were normal in appearance [EOMI] : extraocular movements intact [No JVD] : no jugular venous distention [Normal Oropharynx] : the oropharynx was normal [Supple] : supple [No Lymphadenopathy] : no lymphadenopathy [No Respiratory Distress] : no respiratory distress  [Thyroid Normal, No Nodules] : the thyroid was normal and there were no nodules present [No Accessory Muscle Use] : no accessory muscle use [Clear to Auscultation] : lungs were clear to auscultation bilaterally [Normal Rate] : normal rate  [Regular Rhythm] : with a regular rhythm [Normal S1, S2] : normal S1 and S2 [No Murmur] : no murmur heard [No Carotid Bruits] : no carotid bruits [No Abdominal Bruit] : a ~M bruit was not heard ~T in the abdomen [No Varicosities] : no varicosities [Pedal Pulses Present] : the pedal pulses are present [No Edema] : there was no peripheral edema [No Palpable Aorta] : no palpable aorta [No Extremity Clubbing/Cyanosis] : no extremity clubbing/cyanosis [Soft] : abdomen soft [Non Tender] : non-tender [No HSM] : no HSM [No Masses] : no abdominal mass palpated [Non-distended] : non-distended [Normal Posterior Cervical Nodes] : no posterior cervical lymphadenopathy [Normal Bowel Sounds] : normal bowel sounds [Normal Anterior Cervical Nodes] : no anterior cervical lymphadenopathy [No CVA Tenderness] : no CVA  tenderness [No Spinal Tenderness] : no spinal tenderness [Grossly Normal Strength/Tone] : grossly normal strength/tone [No Rash] : no rash [No Focal Deficits] : no focal deficits [Coordination Grossly Intact] : coordination grossly intact [Deep Tendon Reflexes (DTR)] : deep tendon reflexes were 2+ and symmetric [Normal Gait] : normal gait [Normal Insight/Judgement] : insight and judgment were intact [Normal Affect] : the affect was normal [de-identified] : Mild effusion of b/l knees, not TTP, not warm to touch.

## 2024-03-12 NOTE — HISTORY OF PRESENT ILLNESS
[FreeTextEntry1] : Follow-up [de-identified] : 67 yo F with a history of RA, OA, HTN, H.pylori s/p quad therapy, anxiety, depression, presenting for as a follow-up for multiple acute concerns.  Patient hypertensive at last visit and was restarted on amlodipine 5mg. Patient tolerating well, denies edema in the interim. Was able to see ortho for her knee pain/swelling at last visit and underwent steroid injection. Today reports near complete resolution of her knee pain and improvement in swelling. Was unable to see podiatry for her partial nail detachment in the interim but will schedule appt when able.  More recently patient reporting an exacerbation of her acid reflux with excessive burping and epigastric swelling. Has been avoiding trigger foods but reports no improvement in symptoms. Has noticed subjective weight loss and has lost 7lb since last visit. Denies emesis or changes in bowel habits.

## 2024-03-14 ENCOUNTER — RESULT REVIEW (OUTPATIENT)
Age: 69
End: 2024-03-14

## 2024-03-15 ENCOUNTER — APPOINTMENT (OUTPATIENT)
Dept: CT IMAGING | Facility: CLINIC | Age: 69
End: 2024-03-15
Payer: MEDICARE

## 2024-03-15 PROCEDURE — 74177 CT ABD & PELVIS W/CONTRAST: CPT

## 2024-03-19 DIAGNOSIS — M25.562 PAIN IN LEFT KNEE: ICD-10-CM

## 2024-03-19 DIAGNOSIS — M25.561 PAIN IN RIGHT KNEE: ICD-10-CM

## 2024-03-19 DIAGNOSIS — B35.1 TINEA UNGUIUM: ICD-10-CM

## 2024-03-19 DIAGNOSIS — R63.4 ABNORMAL WEIGHT LOSS: ICD-10-CM

## 2024-03-19 DIAGNOSIS — R10.13 EPIGASTRIC PAIN: ICD-10-CM

## 2024-03-19 DIAGNOSIS — R41.3 OTHER AMNESIA: ICD-10-CM

## 2024-03-19 DIAGNOSIS — G89.29 OTHER CHRONIC PAIN: ICD-10-CM

## 2024-04-11 ENCOUNTER — APPOINTMENT (OUTPATIENT)
Dept: INTERNAL MEDICINE | Facility: CLINIC | Age: 69
End: 2024-04-11
Payer: MEDICARE

## 2024-04-11 ENCOUNTER — OUTPATIENT (OUTPATIENT)
Dept: OUTPATIENT SERVICES | Facility: HOSPITAL | Age: 69
LOS: 1 days | End: 2024-04-11
Payer: MEDICARE

## 2024-04-11 VITALS
HEIGHT: 63 IN | DIASTOLIC BLOOD PRESSURE: 78 MMHG | OXYGEN SATURATION: 97 % | SYSTOLIC BLOOD PRESSURE: 126 MMHG | BODY MASS INDEX: 28.17 KG/M2 | HEART RATE: 67 BPM | WEIGHT: 159 LBS

## 2024-04-11 DIAGNOSIS — Z00.00 ENCOUNTER FOR GENERAL ADULT MEDICAL EXAMINATION W/OUT ABNORMAL FINDINGS: ICD-10-CM

## 2024-04-11 DIAGNOSIS — G89.29 PAIN IN RIGHT KNEE: ICD-10-CM

## 2024-04-11 DIAGNOSIS — I10 ESSENTIAL (PRIMARY) HYPERTENSION: ICD-10-CM

## 2024-04-11 DIAGNOSIS — M25.561 PAIN IN RIGHT KNEE: ICD-10-CM

## 2024-04-11 DIAGNOSIS — R63.4 ABNORMAL WEIGHT LOSS: ICD-10-CM

## 2024-04-11 DIAGNOSIS — M25.562 PAIN IN RIGHT KNEE: ICD-10-CM

## 2024-04-11 PROCEDURE — 99213 OFFICE O/P EST LOW 20 MIN: CPT | Mod: GE

## 2024-04-12 PROBLEM — R63.4 UNINTENTIONAL WEIGHT LOSS: Status: ACTIVE | Noted: 2024-03-10

## 2024-04-12 PROBLEM — M25.561 CHRONIC PAIN OF BOTH KNEES: Status: ACTIVE | Noted: 2017-11-15

## 2024-04-17 NOTE — HISTORY OF PRESENT ILLNESS
[FreeTextEntry1] : CPE [de-identified] : 67 yo F with a history of RA, OA, HTN, H.pylori s/p quad therapy, anxiety, depression, presenting for annual CPE.  Patient was last seen in March 2024 as a follow-up for multiple acute concerns. At that visit patient was noted to have 7lb unintentional weight loss along with a recent exacerbation in GERD-like symptoms. Patient underwent CT A/P w/wo contrast which was only remarkable for b/l renal cysts. Since last visit, patient reports significant improvement in dyspepsia along with a 1lb increase. Aside from  b/l knees starting to bother her again, patient overall feels well and is looking forward to the change in seasons. Like to stay active and plans to travel this summer.

## 2024-04-17 NOTE — HEALTH RISK ASSESSMENT
[Change in mental status noted] : No change in mental status noted [Language] : denies difficulty with language [Behavior] : denies difficulty with behavior [Learning/Retaining New Information] : denies difficulty learning/retaining new information [Handling Complex Tasks] : denies difficulty handling complex tasks [Reasoning] : denies difficulty with reasoning [Spatial Ability and Orientation] : denies difficulty with spatial ability and orientation [High Risk Behavior] : no high risk behavior [Reports changes in hearing] : Reports no changes in hearing [Reports changes in vision] : Reports no changes in vision [Reports changes in dental health] : Reports no changes in dental health [MammogramDate] : 05/23 [MammogramComments] : BI-RADS 2 [PapSmearDate] : 05/23 [BoneDensityDate] : 09/21 [ColonoscopyDate] : 09/23

## 2024-04-17 NOTE — ASSESSMENT
[FreeTextEntry1] : 67 yo F with a history of RA, OA, HTN, H.pylori s/p quad therapy, anxiety, depression, presenting for annual CPE. Patient overall doing well, HTN and HLD well-managed and wnl, will recheck A1c and lipid panel today.

## 2024-04-19 PROCEDURE — G0463: CPT

## 2024-04-19 PROCEDURE — 80061 LIPID PANEL: CPT

## 2024-04-19 PROCEDURE — 83036 HEMOGLOBIN GLYCOSYLATED A1C: CPT

## 2024-04-22 DIAGNOSIS — M25.561 PAIN IN RIGHT KNEE: ICD-10-CM

## 2024-04-22 DIAGNOSIS — R63.4 ABNORMAL WEIGHT LOSS: ICD-10-CM

## 2024-04-22 DIAGNOSIS — M25.562 PAIN IN LEFT KNEE: ICD-10-CM

## 2024-04-22 DIAGNOSIS — Z00.00 ENCOUNTER FOR GENERAL ADULT MEDICAL EXAMINATION WITHOUT ABNORMAL FINDINGS: ICD-10-CM

## 2024-04-22 DIAGNOSIS — G89.29 OTHER CHRONIC PAIN: ICD-10-CM

## 2024-04-23 LAB
CHOLEST SERPL-MCNC: 272 MG/DL
ESTIMATED AVERAGE GLUCOSE: 114 MG/DL
HBA1C MFR BLD HPLC: 5.6 %
HDLC SERPL-MCNC: 37 MG/DL
LDLC SERPL CALC-MCNC: 141 MG/DL
NONHDLC SERPL-MCNC: 235 MG/DL
TRIGL SERPL-MCNC: 507 MG/DL

## 2024-04-30 NOTE — H&P ADULT - NSICDXPASTSURGICALHX_GEN_ALL_CORE_FT
Is the patient currently in the state of [place]? YES    Visit mode:VIDEO    If the visit is dropped, the patient can be reconnected by: VIDEO VISIT: Text to cell phone:   Telephone Information:   Mobile [phone]       Will anyone else be joining the visit? NO  (If patient encounters technical issues they should call [phone]    How would you like to obtain your AVS? MyChart    Are changes needed to the allergy or medication list? No    Are refills needed on medications prescribed by this physician? NO    Reason for visit: Consult    Shelby Kocher VVF    
PAST SURGICAL HISTORY:  No significant past surgical history

## 2024-05-01 ENCOUNTER — RX RENEWAL (OUTPATIENT)
Age: 69
End: 2024-05-01

## 2024-05-15 RX ORDER — VALSARTAN AND HYDROCHLOROTHIAZIDE 320; 25 MG/1; MG/1
320-25 TABLET, FILM COATED ORAL DAILY
Qty: 90 | Refills: 1 | Status: ACTIVE | COMMUNITY
Start: 2024-02-02 | End: 1900-01-01

## 2024-05-15 NOTE — ASSESSMENT
Discharge Summary        Date of Admission: 08/12/2022  Date of Discharge: 08/13/2022      Admitting Physician: Dr. Rhonda Abdalla  Consults: none     DATE OF PROCEDURE: 8/12/2022     PREOPERATIVE DIAGNOSES:  1. C5-C6 cervical stenosis with radiculopathy. 2.  C5-C6 degenerative disc disease. 3.  C5-C6 herniated nucleus pulposus  4. Obesity, BMI of 36.64     POSTOPERATIVE DIAGNOSES:  1. C5-C6 cervical stenosis with radiculopathy. 2.  C5-C6 degenerative disc disease. 3.  C5-C6 herniated nucleus pulposus  4. Obesity, BMI of 36.64     OPERATION PERFORMED:  1. C5-C6 anterior cervical diskectomy with decompression and stabilization of spinal cord and nerve roots. 2.  C5-C6 total disc replacement, M6-C 6LL, Orthofix instrumentation     SURGEON:  Manuela Abdalla M.D. INDICATIONS:  This is a 39 y.o. male with refractory neck and right arm pain and left hand numbness/weakness from cervical stenosis primarily at C5 through C6 that began following a work accident in 2021. Patient had tried and failed conservative therapy including medication management, physical therapy, and chiropractic treatment. Due to the persistence of symptoms and reduction in ADLs, patient elected surgical treatment. Patient therefore understood the indications for the surgery as well as risks, benefits, and alternatives. These risks included, but are not limited to, paralysis, infection, dural tear, hematoma, nerve root injury, hardware failure, permanent speech and swallowing disturbances, DVT/PE, stroke, MI, death etc. All questions were answered and informed consent was obtained. HOSPITAL COURSE:   The patient was admitted on the above date had the above procedure performed. Patient had no complications during surgery. The patient was then transferred to the PACU and to a regular nursing floor for postop care.  The patient's pain was initially controlled with IV medications but we were able to transition to oral pain medications
[FreeTextEntry1] : 66F PMH HTN pre-DM depression L knee OA, here for CPE and BP check; complaining of memory loss and left knee osteoarthritis pain, otherwise in her usual state of health. \par 
[facility]

## 2024-05-16 NOTE — DISCHARGE NOTE ADULT - MEDICATION SUMMARY - MEDICATIONS TO TAKE
I will START or STAY ON the medications listed below when I get home from the hospital:    escitalopram 10 mg oral tablet  -- 1 tab(s) by mouth once a day  -- Indication: For Depression    traZODone 300 mg oral tablet  -- 1 tab(s) by mouth once a day (at bedtime)  -- Indication: For Depression    simvastatin 40 mg oral tablet  -- 1 tab(s) by mouth once a day (at bedtime)  -- Indication: For Hyperlipidemia    amLODIPine 10 mg oral tablet, disintegrating  --  by mouth   -- Indication: For Hypertension    hydroCHLOROthiazide 25 mg oral tablet  -- 1 tab(s) by mouth once a day  -- Indication: For Hypertension    omeprazole 20 mg oral delayed release capsule  -- 1 cap(s) by mouth once a day  -- Indication: For reflux    fluticasone 50 mcg inhalation powder  -- 1 puff(s) inhaled 2 times a day  -- Indication: For allergies I will START or STAY ON the medications listed below when I get home from the hospital:    escitalopram 10 mg oral tablet  -- 1 tab(s) by mouth once a day  -- Indication: For Depression    traZODone 300 mg oral tablet  -- 1 tab(s) by mouth once a day (at bedtime)  -- Indication: For Depression    simvastatin 40 mg oral tablet  -- 1 tab(s) by mouth once a day (at bedtime)  -- Indication: For High cholesterol    amLODIPine 10 mg oral tablet, disintegrating  --  by mouth   -- Indication: For High blood pressure    hydroCHLOROthiazide 25 mg oral tablet  -- 1 tab(s) by mouth once a day  -- Indication: For High blood pressure    omeprazole 20 mg oral delayed release capsule  -- 1 cap(s) by mouth once a day  -- Indication: For reflux    fluticasone 50 mcg inhalation powder  -- 1 puff(s) inhaled 2 times a day  -- Indication: For allergies 0 I will START or STAY ON the medications listed below when I get home from the hospital:    escitalopram 10 mg oral tablet  -- 1 tab(s) by mouth once a day  -- Indication: For Depression    traZODone 300 mg oral tablet  -- 1 tab(s) by mouth once a day (at bedtime)  -- Indication: For Depression    simvastatin 40 mg oral tablet  -- 1 tab(s) by mouth once a day (at bedtime)  -- Indication: For High cholesterol    metoprolol succinate 25 mg oral tablet, extended release  -- 1 tab(s) by mouth 2 times a day  -- It is very important that you take or use this exactly as directed.  Do not skip doses or discontinue unless directed by your doctor.  May cause drowsiness.  Alcohol may intensify this effect.  Use care when operating dangerous machinery.  Some non-prescription drugs may aggravate your condition.  Read all labels carefully.  If a warning appears, check with your doctor before taking.  Swallow whole.  Do not crush.  Take with food or milk.  This drug may impair the ability to drive or operate machinery.  Use care until you become familiar with its effects.    -- Indication: For Hypertension    amLODIPine 10 mg oral tablet, disintegrating  --  by mouth   -- Indication: For High blood pressure    hydroCHLOROthiazide 25 mg oral tablet  -- 1 tab(s) by mouth once a day  -- Indication: For High blood pressure    omeprazole 20 mg oral delayed release capsule  -- 1 cap(s) by mouth once a day  -- Indication: For reflux    fluticasone 50 mcg inhalation powder  -- 1 puff(s) inhaled 2 times a day  -- Indication: For allergies

## 2024-06-10 ENCOUNTER — APPOINTMENT (OUTPATIENT)
Dept: GASTROENTEROLOGY | Facility: CLINIC | Age: 69
End: 2024-06-10
Payer: MEDICARE

## 2024-06-10 VITALS
HEIGHT: 63 IN | BODY MASS INDEX: 27.46 KG/M2 | OXYGEN SATURATION: 98 % | HEART RATE: 68 BPM | WEIGHT: 155 LBS | DIASTOLIC BLOOD PRESSURE: 70 MMHG | SYSTOLIC BLOOD PRESSURE: 118 MMHG

## 2024-06-10 DIAGNOSIS — R32 UNSPECIFIED URINARY INCONTINENCE: ICD-10-CM

## 2024-06-10 DIAGNOSIS — K29.51 UNSPECIFIED CHRONIC GASTRITIS WITH BLEEDING: ICD-10-CM

## 2024-06-10 DIAGNOSIS — Z12.11 ENCOUNTER FOR SCREENING FOR MALIGNANT NEOPLASM OF COLON: ICD-10-CM

## 2024-06-10 PROCEDURE — 99214 OFFICE O/P EST MOD 30 MIN: CPT

## 2024-06-10 NOTE — HISTORY OF PRESENT ILLNESS
[de-identified] : 9/28/23 Findings:      The examined esophagus was normal.      The Z-line was regular and was found 38 cm from the incisors.      A 2 cm type-III paraesophageal hernia was found. The proximal extent of the gastric folds       (end of tubular esophagus) was 38 cm from the incisors. The hiatal narrowing was 40 cm from       the incisors. The Z-line was 38 cm from the incisors.      Patchy moderate inflammation with hemorrhage characterized by adherent blood was found in the       entire examined stomach. Biopsies were taken with a cold forceps for histology. Estimated       blood loss was minimal.      The examined duodenum was normal.                                                                                                      Impression:          - Normal esophagus.                      - Z-line regular, 38 cm from the incisors.                      - 2 cm type-III paraesophageal hernia.                      - Gastritis with hemorrhage. Biopsied.                      - Normal examined duodenum.  Path: 1.  Gastric, antrum, biopsy -   Mild to moderate chronic inactive gastritis. -   Negative for intestinal metaplasia. -   Negative for morphologic evidence of H. pylori.  2.  Gastric, body, biopsy -   Mild to moderate chronic inactive gastritis. -   Negative for intestinal metaplasia. -   Negative for morphologic evidence of H. pylori. [FreeTextEntry1] : 9/28/23 The total BBPS score equals 7. The                       quality of the bowel preparation was good.                                                                                                      Findings:      Hemorrhoids were found on perianal exam.      Two sessile polyps were found in the proximal ascending colon. The polyps were 4 to 6 mm in       size. These polyps were removed with a cold snare. Resection and retrieval were complete.       Estimated blood loss was minimal.      A 3 mm polyp was found in the ileocecal valve. The polyp was sessile. The polyp was removed       with a jumbo cold forceps. Resection and retrieval were complete. Estimated blood loss was       minimal.      A 8 mm polyp was found in the descending colon. The polyp was sessile. The polyp was removed       with a cold snare. Resection and retrieval were complete. Estimated blood loss was minimal.      Three sessile polyps were found in the sigmoid colon. The polyps were 2 to 3 mm in size.       These polyps were removed with a jumbo cold forceps. Resection and retrieval were complete.       Estimated blood loss was minimal.      A single small-mouthed diverticulum was found in the sigmoid colon.      Non-bleeding hemorrhoids were found during retroflexion. The hemorrhoids were moderate.                                                                                                      Impression:          - Hemorrhoids found on perianal exam.                      - Two 4 to 6 mm polyps in the proximal ascending colon, removed with a cold                       snare. Resected and retrieved.                      - One 3 mm polyp at the ileocecal valve, removed with a jumbo cold forceps.                       Resected and retrieved.                      - One 8 mm polyp in the descending colon, removed with a cold snare. Resected                       and retrieved.                      - Three 2 to 3 mm polyps in the sigmoid colon, removed with a jumbo cold                       forceps. Resected and retrieved.                      - Diverticulosis in the sigmoid colon.                      - Non-bleeding hemorrhoids. Recommendation:      - Discharge patient to home.                      - Resume regular diet and high fiber diet.                      - Await pathology results.                      - Repeat colonoscopy in 3 years for surveillance based on pathology results.   Path: 3.  Colon, ascending, polyp #1 -   Sessile serrated polyp/adenoma.  4.  Colon, ascending, polyp #2 -   Tubular adenoma.  5.  Ileocecal valve, polyp -   Tubular adenoma.  6.  Colon, descending, polyp -   Tubular adenoma.  7.  Colon, sigmoid, polyps -   Hyperplastic polyps.

## 2024-06-10 NOTE — PHYSICAL EXAM
[Alert] : alert [Normal Voice/Communication] : normal voice/communication [Sclera] : the sclera and conjunctiva were normal [Hearing Threshold Finger Rub Not [place]] : hearing was normal [Normal Appearance] : the appearance of the neck was normal [No Respiratory Distress] : no respiratory distress [No Acc Muscle Use] : no accessory muscle use [Heart Rate And Rhythm] : heart rate was normal and rhythm regular [Normal S1, S2] : normal S1 and S2 [Bowel Sounds] : normal bowel sounds [Abdomen Tenderness] : non-tender [No Masses] : no abdominal mass palpated [Abdomen Soft] : soft [No CVA Tenderness] : no CVA  tenderness [Abnormal Walk] : normal gait [Normal Color / Pigmentation] : normal skin color and pigmentation [No Focal Deficits] : no focal deficits [Oriented To Time, Place, And Person] : oriented to person, place, and time

## 2024-06-10 NOTE — ASSESSMENT
[FreeTextEntry1] : Impression: 1. Colon cancer screening currently up to date - >5 adenomas all subcentimeter seen on 9/2023 colonoscopy 2. Hemorrhagic gastritis - suspect medication related, no H pylori or metaplasia seen on biopsy specimens 3. Urinary incontinence  Plan: -Avoid NSAIDs -Reviewed pathology findings from EGD/colonoscopy with patient -Advised repeat colonoscopy with a 3 year interval in 2026 as per guidelines -Advised lifestyle modifications for reflux. Asked patient to avoid eating 3 hours before going to bed or laying down, and to elevate the head of her bed. -Will refer to urogynecology given urinary incontinence

## 2024-09-11 NOTE — ED ADULT NURSE NOTE - PAIN RATING/NUMBER SCALE (0-10): REST
Airway    Performed by: Catherine Griffiths DO  Authorized by: Catherine Griffiths DO    Final Airway Type:  Endotracheal airway  Final Endotracheal Airway*:  ETT  ETT Size (mm)*:  7.0  Cuff*:  Regular  Technique Used for Successful ETT Placement:  Video laryngoscopy  Intubation Procedure*:  ETCO2, Preoxygenation, Atraumatic, Dentition Unchanged and Pharynx Clear  Insertion Site:  Oral  Blade Type*:  Video Laryngoscope  Blade Size*:  3  Measured from*:  Teeth  Placement Verified by: capnometry    Glottic View*:  1 - full view of glottis  Attempts*:  1  Ventilation Between Attempts:  Bag valve  Number of Other Approaches Attempted:  0   Patient Identified, Procedure confirmed, Emergency equipment available and Safety protocols followed  Location:  OR  Urgency:  Elective  Difficult Airway: No    Indications for Airway Management:  Anesthesia  Mask Difficulty Assessment:  1 - vent by mask  Start Time: 9/11/2024 10:31 AM       8

## 2024-09-17 NOTE — PROGRESS NOTE ADULT - PROBLEM SELECTOR PLAN 5
Adult
- continue home atorvastatin 40mg PO qd

## 2025-01-06 ENCOUNTER — APPOINTMENT (OUTPATIENT)
Dept: INTERNAL MEDICINE | Facility: CLINIC | Age: 70
End: 2025-01-06
Payer: MEDICARE

## 2025-01-06 ENCOUNTER — APPOINTMENT (OUTPATIENT)
Dept: INTERNAL MEDICINE | Facility: CLINIC | Age: 70
End: 2025-01-06

## 2025-01-06 ENCOUNTER — LABORATORY RESULT (OUTPATIENT)
Age: 70
End: 2025-01-06

## 2025-01-06 ENCOUNTER — OUTPATIENT (OUTPATIENT)
Dept: OUTPATIENT SERVICES | Facility: HOSPITAL | Age: 70
LOS: 1 days | End: 2025-01-06
Payer: MEDICARE

## 2025-01-06 VITALS
DIASTOLIC BLOOD PRESSURE: 80 MMHG | SYSTOLIC BLOOD PRESSURE: 156 MMHG | HEART RATE: 71 BPM | OXYGEN SATURATION: 98 % | HEIGHT: 63 IN | WEIGHT: 163 LBS | BODY MASS INDEX: 28.88 KG/M2

## 2025-01-06 DIAGNOSIS — B35.1 TINEA UNGUIUM: ICD-10-CM

## 2025-01-06 DIAGNOSIS — L60.0 INGROWING NAIL: ICD-10-CM

## 2025-01-06 DIAGNOSIS — L28.0 LICHEN SIMPLEX CHRONICUS: ICD-10-CM

## 2025-01-06 DIAGNOSIS — I10 ESSENTIAL (PRIMARY) HYPERTENSION: ICD-10-CM

## 2025-01-06 DIAGNOSIS — M25.462 EFFUSION, LEFT KNEE: ICD-10-CM

## 2025-01-06 DIAGNOSIS — R60.0 LOCALIZED EDEMA: ICD-10-CM

## 2025-01-06 DIAGNOSIS — K21.9 GASTRO-ESOPHAGEAL REFLUX DISEASE W/OUT ESOPHAGITIS: ICD-10-CM

## 2025-01-06 DIAGNOSIS — F41.9 ANXIETY DISORDER, UNSPECIFIED: ICD-10-CM

## 2025-01-06 DIAGNOSIS — R30.0 DYSURIA: ICD-10-CM

## 2025-01-06 PROCEDURE — 99213 OFFICE O/P EST LOW 20 MIN: CPT | Mod: GE

## 2025-01-06 PROCEDURE — 87086 URINE CULTURE/COLONY COUNT: CPT

## 2025-01-06 PROCEDURE — 87186 SC STD MICRODIL/AGAR DIL: CPT

## 2025-01-06 PROCEDURE — 81001 URINALYSIS AUTO W/SCOPE: CPT

## 2025-01-06 PROCEDURE — G0463: CPT

## 2025-01-06 RX ORDER — CLOBETASOL PROPIONATE CREAM USP, 0.05% 0.5 MG/G
0.05 CREAM TOPICAL
Qty: 1 | Refills: 0 | Status: ACTIVE | COMMUNITY
Start: 2025-01-06 | End: 1900-01-01

## 2025-01-06 RX ORDER — FAMOTIDINE 40 MG/1
40 TABLET, FILM COATED ORAL DAILY
Qty: 90 | Refills: 0 | Status: ACTIVE | COMMUNITY
Start: 2025-01-06 | End: 1900-01-01

## 2025-01-09 DIAGNOSIS — N39.0 URINARY TRACT INFECTION, SITE NOT SPECIFIED: ICD-10-CM

## 2025-01-09 LAB
APPEARANCE: CLEAR
BACTERIA UR CULT: ABNORMAL
BILIRUBIN URINE: NEGATIVE
BLOOD URINE: NEGATIVE
COLOR: YELLOW
GLUCOSE QUALITATIVE U: NEGATIVE MG/DL
KETONES URINE: NEGATIVE MG/DL
LEUKOCYTE ESTERASE URINE: ABNORMAL
NITRITE URINE: NEGATIVE
PH URINE: 7
PROTEIN URINE: NORMAL MG/DL
SPECIFIC GRAVITY URINE: 1.02
UROBILINOGEN URINE: 1 MG/DL

## 2025-01-09 RX ORDER — NITROFURANTOIN (MONOHYDRATE/MACROCRYSTALS) 25; 75 MG/1; MG/1
100 CAPSULE ORAL
Qty: 10 | Refills: 0 | Status: ACTIVE | COMMUNITY
Start: 2025-01-09 | End: 1900-01-01

## 2025-01-14 DIAGNOSIS — L60.0 INGROWING NAIL: ICD-10-CM

## 2025-01-14 DIAGNOSIS — B35.1 TINEA UNGUIUM: ICD-10-CM

## 2025-01-14 DIAGNOSIS — K21.9 GASTRO-ESOPHAGEAL REFLUX DISEASE WITHOUT ESOPHAGITIS: ICD-10-CM

## 2025-01-14 DIAGNOSIS — F41.9 ANXIETY DISORDER, UNSPECIFIED: ICD-10-CM

## 2025-01-14 DIAGNOSIS — R30.0 DYSURIA: ICD-10-CM

## 2025-01-14 DIAGNOSIS — L28.0 LICHEN SIMPLEX CHRONICUS: ICD-10-CM

## 2025-01-14 DIAGNOSIS — R60.0 LOCALIZED EDEMA: ICD-10-CM

## 2025-01-29 ENCOUNTER — OUTPATIENT (OUTPATIENT)
Dept: OUTPATIENT SERVICES | Facility: HOSPITAL | Age: 70
LOS: 1 days | End: 2025-01-29
Payer: MEDICARE

## 2025-01-29 ENCOUNTER — NON-APPOINTMENT (OUTPATIENT)
Age: 70
End: 2025-01-29

## 2025-01-29 ENCOUNTER — APPOINTMENT (OUTPATIENT)
Dept: INTERNAL MEDICINE | Facility: CLINIC | Age: 70
End: 2025-01-29
Payer: MEDICARE

## 2025-01-29 VITALS
BODY MASS INDEX: 29.05 KG/M2 | WEIGHT: 164 LBS | HEART RATE: 74 BPM | DIASTOLIC BLOOD PRESSURE: 74 MMHG | SYSTOLIC BLOOD PRESSURE: 138 MMHG | OXYGEN SATURATION: 95 %

## 2025-01-29 DIAGNOSIS — R07.89 OTHER CHEST PAIN: ICD-10-CM

## 2025-01-29 DIAGNOSIS — R06.02 SHORTNESS OF BREATH: ICD-10-CM

## 2025-01-29 DIAGNOSIS — I10 ESSENTIAL (PRIMARY) HYPERTENSION: ICD-10-CM

## 2025-01-29 PROCEDURE — G0463: CPT

## 2025-01-29 PROCEDURE — 99214 OFFICE O/P EST MOD 30 MIN: CPT | Mod: 25

## 2025-01-29 PROCEDURE — 93010 ELECTROCARDIOGRAM REPORT: CPT

## 2025-01-30 ENCOUNTER — APPOINTMENT (OUTPATIENT)
Dept: CARDIOLOGY | Facility: CLINIC | Age: 70
End: 2025-01-30

## 2025-02-03 DIAGNOSIS — R07.89 OTHER CHEST PAIN: ICD-10-CM

## 2025-02-03 DIAGNOSIS — R06.02 SHORTNESS OF BREATH: ICD-10-CM

## 2025-02-04 DIAGNOSIS — L91.8 OTHER HYPERTROPHIC DISORDERS OF THE SKIN: ICD-10-CM

## 2025-02-18 ENCOUNTER — APPOINTMENT (OUTPATIENT)
Dept: ORTHOPEDIC SURGERY | Facility: CLINIC | Age: 70
End: 2025-02-18
Payer: MEDICARE

## 2025-02-18 VITALS — BODY MASS INDEX: 28.7 KG/M2 | WEIGHT: 162 LBS | HEIGHT: 63 IN

## 2025-02-18 DIAGNOSIS — M17.12 UNILATERAL PRIMARY OSTEOARTHRITIS, LEFT KNEE: ICD-10-CM

## 2025-02-18 DIAGNOSIS — M17.11 UNILATERAL PRIMARY OSTEOARTHRITIS, RIGHT KNEE: ICD-10-CM

## 2025-02-18 PROCEDURE — 99214 OFFICE O/P EST MOD 30 MIN: CPT | Mod: 25

## 2025-02-18 PROCEDURE — 20610 DRAIN/INJ JOINT/BURSA W/O US: CPT | Mod: LT

## 2025-02-18 PROCEDURE — 73564 X-RAY EXAM KNEE 4 OR MORE: CPT | Mod: LT,RT

## 2025-02-27 ENCOUNTER — APPOINTMENT (OUTPATIENT)
Dept: CARDIOLOGY | Facility: CLINIC | Age: 70
End: 2025-02-27
Payer: MEDICARE

## 2025-02-27 ENCOUNTER — NON-APPOINTMENT (OUTPATIENT)
Age: 70
End: 2025-02-27

## 2025-02-27 VITALS
BODY MASS INDEX: 28.7 KG/M2 | HEART RATE: 67 BPM | SYSTOLIC BLOOD PRESSURE: 134 MMHG | DIASTOLIC BLOOD PRESSURE: 78 MMHG | OXYGEN SATURATION: 99 % | WEIGHT: 162 LBS

## 2025-02-27 DIAGNOSIS — R07.89 OTHER CHEST PAIN: ICD-10-CM

## 2025-02-27 DIAGNOSIS — E78.5 HYPERLIPIDEMIA, UNSPECIFIED: ICD-10-CM

## 2025-02-27 PROCEDURE — 99204 OFFICE O/P NEW MOD 45 MIN: CPT

## 2025-02-27 RX ORDER — NITROGLYCERIN 0.4 MG/1
0.4 TABLET SUBLINGUAL
Qty: 5 | Refills: 3 | Status: ACTIVE | COMMUNITY
Start: 2025-02-27 | End: 1900-01-01

## 2025-03-04 ENCOUNTER — EMERGENCY (EMERGENCY)
Facility: HOSPITAL | Age: 70
LOS: 1 days | Discharge: HOME | End: 2025-03-04
Attending: EMERGENCY MEDICINE
Payer: MEDICARE

## 2025-03-04 VITALS
RESPIRATION RATE: 16 BRPM | OXYGEN SATURATION: 99 % | HEART RATE: 63 BPM | DIASTOLIC BLOOD PRESSURE: 80 MMHG | TEMPERATURE: 98 F | SYSTOLIC BLOOD PRESSURE: 145 MMHG | HEIGHT: 64 IN | WEIGHT: 164.02 LBS

## 2025-03-04 LAB
ALBUMIN SERPL ELPH-MCNC: 3.6 G/DL — SIGNIFICANT CHANGE UP (ref 3.3–5)
ALP SERPL-CCNC: 132 U/L — HIGH (ref 40–120)
ALT FLD-CCNC: 17 U/L — SIGNIFICANT CHANGE UP (ref 10–45)
ANION GAP SERPL CALC-SCNC: 13 MMOL/L — SIGNIFICANT CHANGE UP (ref 5–17)
AST SERPL-CCNC: 23 U/L — SIGNIFICANT CHANGE UP (ref 10–40)
BASOPHILS # BLD AUTO: 0.05 K/UL — SIGNIFICANT CHANGE UP (ref 0–0.2)
BASOPHILS NFR BLD AUTO: 0.2 % — SIGNIFICANT CHANGE UP (ref 0–2)
BILIRUB SERPL-MCNC: 0.6 MG/DL — SIGNIFICANT CHANGE UP (ref 0.2–1.2)
BUN SERPL-MCNC: 21 MG/DL — SIGNIFICANT CHANGE UP (ref 7–23)
CALCIUM SERPL-MCNC: 9.4 MG/DL — SIGNIFICANT CHANGE UP (ref 8.4–10.5)
CHLORIDE SERPL-SCNC: 101 MMOL/L — SIGNIFICANT CHANGE UP (ref 96–108)
CO2 SERPL-SCNC: 24 MMOL/L — SIGNIFICANT CHANGE UP (ref 22–31)
CREAT SERPL-MCNC: 1.16 MG/DL — SIGNIFICANT CHANGE UP (ref 0.5–1.3)
EGFR: 51 ML/MIN/1.73M2 — LOW
EOSINOPHIL # BLD AUTO: 0.11 K/UL — SIGNIFICANT CHANGE UP (ref 0–0.5)
EOSINOPHIL NFR BLD AUTO: 0.5 % — SIGNIFICANT CHANGE UP (ref 0–6)
GLUCOSE SERPL-MCNC: 95 MG/DL — SIGNIFICANT CHANGE UP (ref 70–99)
HCT VFR BLD CALC: 42.8 % — SIGNIFICANT CHANGE UP (ref 34.5–45)
HGB BLD-MCNC: 13.7 G/DL — SIGNIFICANT CHANGE UP (ref 11.5–15.5)
IMM GRANULOCYTES NFR BLD AUTO: 0.5 % — SIGNIFICANT CHANGE UP (ref 0–0.9)
LYMPHOCYTES # BLD AUTO: 14.3 % — SIGNIFICANT CHANGE UP (ref 13–44)
LYMPHOCYTES # BLD AUTO: 2.86 K/UL — SIGNIFICANT CHANGE UP (ref 1–3.3)
MAGNESIUM SERPL-MCNC: 2.3 MG/DL — SIGNIFICANT CHANGE UP (ref 1.6–2.6)
MCHC RBC-ENTMCNC: 27.5 PG — SIGNIFICANT CHANGE UP (ref 27–34)
MCHC RBC-ENTMCNC: 32 G/DL — SIGNIFICANT CHANGE UP (ref 32–36)
MCV RBC AUTO: 85.8 FL — SIGNIFICANT CHANGE UP (ref 80–100)
MONOCYTES # BLD AUTO: 0.98 K/UL — HIGH (ref 0–0.9)
MONOCYTES NFR BLD AUTO: 4.9 % — SIGNIFICANT CHANGE UP (ref 2–14)
NEUTROPHILS # BLD AUTO: 15.95 K/UL — HIGH (ref 1.8–7.4)
NEUTROPHILS NFR BLD AUTO: 79.6 % — HIGH (ref 43–77)
NRBC BLD AUTO-RTO: 0 /100 WBCS — SIGNIFICANT CHANGE UP (ref 0–0)
PLATELET # BLD AUTO: 278 K/UL — SIGNIFICANT CHANGE UP (ref 150–400)
POTASSIUM SERPL-MCNC: 3.5 MMOL/L — SIGNIFICANT CHANGE UP (ref 3.5–5.3)
POTASSIUM SERPL-SCNC: 3.5 MMOL/L — SIGNIFICANT CHANGE UP (ref 3.5–5.3)
PROT SERPL-MCNC: 7.3 G/DL — SIGNIFICANT CHANGE UP (ref 6–8.3)
RBC # BLD: 4.99 M/UL — SIGNIFICANT CHANGE UP (ref 3.8–5.2)
RBC # FLD: 13.5 % — SIGNIFICANT CHANGE UP (ref 10.3–14.5)
SODIUM SERPL-SCNC: 138 MMOL/L — SIGNIFICANT CHANGE UP (ref 135–145)
TROPONIN T, HIGH SENSITIVITY RESULT: 7 NG/L — SIGNIFICANT CHANGE UP (ref 0–51)
WBC # BLD: 20.06 K/UL — HIGH (ref 3.8–10.5)
WBC # FLD AUTO: 20.06 K/UL — HIGH (ref 3.8–10.5)

## 2025-03-04 PROCEDURE — 73502 X-RAY EXAM HIP UNI 2-3 VIEWS: CPT | Mod: 26,RT

## 2025-03-04 PROCEDURE — 73630 X-RAY EXAM OF FOOT: CPT | Mod: 26,RT

## 2025-03-04 PROCEDURE — 73610 X-RAY EXAM OF ANKLE: CPT | Mod: 26,RT

## 2025-03-04 PROCEDURE — 70450 CT HEAD/BRAIN W/O DYE: CPT | Mod: 26

## 2025-03-04 PROCEDURE — 72125 CT NECK SPINE W/O DYE: CPT | Mod: 26

## 2025-03-04 PROCEDURE — 93010 ELECTROCARDIOGRAM REPORT: CPT

## 2025-03-04 PROCEDURE — 73590 X-RAY EXAM OF LOWER LEG: CPT | Mod: 26,RT

## 2025-03-04 PROCEDURE — 71045 X-RAY EXAM CHEST 1 VIEW: CPT | Mod: 26

## 2025-03-04 PROCEDURE — 99285 EMERGENCY DEPT VISIT HI MDM: CPT

## 2025-03-04 RX ORDER — ACETAMINOPHEN 500 MG/5ML
1000 LIQUID (ML) ORAL ONCE
Refills: 0 | Status: COMPLETED | OUTPATIENT
Start: 2025-03-04 | End: 2025-03-04

## 2025-03-04 RX ADMIN — Medication 1000 MILLILITER(S): at 19:00

## 2025-03-04 RX ADMIN — Medication 2 MILLIGRAM(S): at 23:53

## 2025-03-04 RX ADMIN — Medication 1000 MILLILITER(S): at 20:00

## 2025-03-04 RX ADMIN — Medication 1000 MILLIGRAM(S): at 19:15

## 2025-03-04 RX ADMIN — Medication 400 MILLIGRAM(S): at 19:00

## 2025-03-04 RX ADMIN — Medication 1000 MILLIGRAM(S): at 19:20

## 2025-03-04 NOTE — ED ADULT NURSE NOTE - OBJECTIVE STATEMENT
69 year old female, A&Ox4, presenting to ED s/p accidental fall. Patient said she was lifting the back of a chair when she lost balance and fell onto her face. Patient lost consciousness briefly and hit the left side of her head/face. Denies blood thinner use. Also endorsing intermittent chest pain. Denies SOB, n/v/d, abdominal pain, difficulty urinating, fevers and chills. Heart rate and rhythm regular. Respirations clear and equal bilaterally. Abdomen soft, nontender and nondistended. Peripheral pulses strong and equal bilaterally. IV placed and labs drawn. Safety and comfort measures maintained.

## 2025-03-04 NOTE — ED PROVIDER NOTE - CLINICAL SUMMARY MEDICAL DECISION MAKING FREE TEXT BOX
69-year-old female past medical history of hyperlipidemia, hypertension presents to the emergency department after mechanical slip and fall.  Patient states she was hanging onto the back of her 's chair, it tipped over landing on her foot and she fell backwards onto her buttocks. Has swelling over her left eyebrow.  Otherwise no signs of skull fracture.  No maxillary bone tenderness.  No midline C-spine tenderness only isolated tenderness over right foot anteriorly.  Neurovascularly intact, 5 out of 5 muscle strength upper and lower extremities.  Plan is to get CT head, C-spine, x-ray of right foot and ankle.  Syncope workup, EKG is nonischemic.  Troponins reassess.

## 2025-03-04 NOTE — ED PROVIDER NOTE - PROGRESS NOTE DETAILS
MD Lucille (PGY-3) Received sign out on patient. In brief, 69-year-old female with past medical history of hyperlipidemia, hypertension, presenting to the emergency room with fall with associated syncope..  Patient labs still pending along with imaging at this time.  Dispo pending imaging and lab work.

## 2025-03-04 NOTE — ED ADULT TRIAGE NOTE - CHIEF COMPLAINT QUOTE
patient states her  leaned back in his chair accidently hitting into her causing her to fall to the ground injuring her right hip/leg, patient states she didn't hit her head, no LOC, then patient stood up went to go to the bathroom, felt light headed and fell again hitting her head, unknown LOC, no loss of bowel or bladder, no seizure activity witnessed by family member, no confusion after the fall  patient endorses headache  Denies chest pain, SOB, dizziness

## 2025-03-04 NOTE — ED PROVIDER NOTE - MUSCULOSKELETAL MINIMAL EXAM
+Ecchymosis and tenderness to palpation over anterior foot, right.  There is a bruise above the left eyebrow.  Otherwise no signs of skull fracture.

## 2025-03-04 NOTE — ED ADULT NURSE NOTE - NS ED NURSE LEVEL OF CONSCIOUSNESS AFFECT
- Bright red sputum x1 on 1/1  - Blood-tinged sputum, unclear if related to Robitussin use, however was given last night and has not had red sputum previously while on med  - CBC, coags, CXR unremarkable  - No hemoptysis today 1/4 Calm/Appropriate - Bright red sputum x1 on 1/1  - Blood-tinged sputum, unclear if related to Robitussin use, however was given last night and has not had red sputum previously while on med  - CBC, coags, CXR unremarkable  - No hemoptysis 1/4  - Hemoptysis again 1/5 AM, but day team unable to assess as accidentally thrown out prior to seeing patient  - D/c Robitussin, c/w PRN Tessalon perles in case that may be regurgitated Robitussin

## 2025-03-04 NOTE — ED PROVIDER NOTE - ATTENDING CONTRIBUTION TO CARE
Attending MD Natarajan:  I have seen and examined this patient and fully participated in the care of this patient as the teaching attending. I personally made/approved the management plan and take responsibility for the patient management.      69-year-old woman is presenting for evaluation of fall from standing and possible syncopal episode.  Patient initially caught her right foot on a recliner chair causing her to fall backwards hitting her buttocks.  After this episode she attempted to walk to the bathroom but was feeling dizzy, while she was walking out of the bathroom she suddenly lost consciousness resulting in a fall with head injury.  This was visualized by her .  Patient did lose consciousness but possibly for seconds.  There was no shaking activity of the body noted.  No vomiting.  Patient is currently complaining of head pain right buttock and hip pain and right foot pain.  Patient incidentally is also complaining of recent onset of chest pain that started 1 week ago.  The pain is in the left anterior chest it was sharp last seconds at a time.  Not associated with nausea vomiting shortness of breath.    Patient's vital signs are nonactionable.  She is sitting in the stretcher no apparent distress.  GCS is 15.  There is a small left frontal scalp hematoma and there is no bony facial tenderness to palpation.  Moves all extremities spontaneously. Symmetric  strength bilateral upper extremities, elbow flexion 5/5 bilaterally, elbow extension 5/5 bilaterally, patient can straight leg raise bilaterally and resist symmetrically. Sensation grossly intact to light tough in bilateral face, arms and legs. Symmetric smile, EOMI b/l. PERRL b/l.  The cervical spine is nontender chest wall nontender clear lungs anteriorly, heart is regular without obvious murmurs.  Abdomen nontender.  Mild tenderness of the right lateral hip, there is no foreshortening or rotation right lower extremity.  Extremities are warm to the touch.  There is ecchymosis to the distal dorsal aspect of the foot with associated tenderness to palpation, nontender right ankle, nontender right tib-fib and right knee.    ECG recorded at 1749 independently interpreted by me , Dr Jewel Natarajan,  at 1903 shows normal sinus rhythm normal axis normal intervals no acute diagnostic ischemic ST-T changes    Patient is presenting for evaluation of what initially started as mechanical fall from standing with right foot injury but then progressed to syncope after the initial fall and foot injury with subsequent head injury.  Plan at this time will be to obtain CT head and cervical spine, x-rays of the right lower extremity, maintain on cardiac monitor given reports of chest discomfort over the last few days with syncopal episode.  Will obtain cardiac biomarkers to rule out ACS.  Presenting symptoms are not consistent with pulmonary embolism or aortic dissection.  Possible that subsequent syncopal episode after initial fall could be vagally mediated as patient was in significant pain in her right foot.  Nonetheless we will maintain on cardiac monitor screening for dysrhythmia.      *The above represents an initial assessment/impression. Please refer to progress notes for potential changes in patient clinical course*

## 2025-03-04 NOTE — ED PROVIDER NOTE - OBJECTIVE STATEMENT
69-year-old female past medical history of hyperlipidemia, hypertension presents to the emergency department after mechanical slip and fall.  Patient states she was hanging onto the back of her 's chair, it tipped over landing on her foot and she fell backwards onto her buttocks.  She got up and walked to the bathroom and then acutely got lightheaded and syncopized and fell onto the floor.  Head trauma.  Denies any fevers, chest pain, abdominal pain, shortness of breath, focal weakness, gait changes or other concerning symptoms at this time.

## 2025-03-05 ENCOUNTER — RESULT REVIEW (OUTPATIENT)
Age: 70
End: 2025-03-05

## 2025-03-05 VITALS
OXYGEN SATURATION: 98 % | SYSTOLIC BLOOD PRESSURE: 148 MMHG | HEART RATE: 63 BPM | RESPIRATION RATE: 18 BRPM | DIASTOLIC BLOOD PRESSURE: 73 MMHG | TEMPERATURE: 99 F

## 2025-03-05 LAB
APPEARANCE UR: CLEAR — SIGNIFICANT CHANGE UP
BACTERIA # UR AUTO: NEGATIVE /HPF — SIGNIFICANT CHANGE UP
BASOPHILS # BLD AUTO: 0.03 K/UL — SIGNIFICANT CHANGE UP (ref 0–0.2)
BASOPHILS NFR BLD AUTO: 0.2 % — SIGNIFICANT CHANGE UP (ref 0–2)
BILIRUB UR-MCNC: NEGATIVE — SIGNIFICANT CHANGE UP
CAST: 0 /LPF — SIGNIFICANT CHANGE UP (ref 0–4)
COLOR SPEC: YELLOW — SIGNIFICANT CHANGE UP
DIFF PNL FLD: NEGATIVE — SIGNIFICANT CHANGE UP
EOSINOPHIL # BLD AUTO: 0.1 K/UL — SIGNIFICANT CHANGE UP (ref 0–0.5)
EOSINOPHIL NFR BLD AUTO: 0.6 % — SIGNIFICANT CHANGE UP (ref 0–6)
GLUCOSE UR QL: NEGATIVE MG/DL — SIGNIFICANT CHANGE UP
HCT VFR BLD CALC: 38.9 % — SIGNIFICANT CHANGE UP (ref 34.5–45)
HGB BLD-MCNC: 12.5 G/DL — SIGNIFICANT CHANGE UP (ref 11.5–15.5)
IMM GRANULOCYTES NFR BLD AUTO: 0.5 % — SIGNIFICANT CHANGE UP (ref 0–0.9)
KETONES UR-MCNC: NEGATIVE MG/DL — SIGNIFICANT CHANGE UP
LEUKOCYTE ESTERASE UR-ACNC: NEGATIVE — SIGNIFICANT CHANGE UP
LYMPHOCYTES # BLD AUTO: 22 % — SIGNIFICANT CHANGE UP (ref 13–44)
LYMPHOCYTES # BLD AUTO: 3.45 K/UL — HIGH (ref 1–3.3)
MCHC RBC-ENTMCNC: 27.8 PG — SIGNIFICANT CHANGE UP (ref 27–34)
MCHC RBC-ENTMCNC: 32.1 G/DL — SIGNIFICANT CHANGE UP (ref 32–36)
MCV RBC AUTO: 86.6 FL — SIGNIFICANT CHANGE UP (ref 80–100)
MONOCYTES # BLD AUTO: 0.74 K/UL — SIGNIFICANT CHANGE UP (ref 0–0.9)
MONOCYTES NFR BLD AUTO: 4.7 % — SIGNIFICANT CHANGE UP (ref 2–14)
NEUTROPHILS # BLD AUTO: 11.29 K/UL — HIGH (ref 1.8–7.4)
NEUTROPHILS NFR BLD AUTO: 72 % — SIGNIFICANT CHANGE UP (ref 43–77)
NITRITE UR-MCNC: NEGATIVE — SIGNIFICANT CHANGE UP
NRBC BLD AUTO-RTO: 0 /100 WBCS — SIGNIFICANT CHANGE UP (ref 0–0)
PH UR: 6 — SIGNIFICANT CHANGE UP (ref 5–8)
PLATELET # BLD AUTO: 259 K/UL — SIGNIFICANT CHANGE UP (ref 150–400)
PROT UR-MCNC: NEGATIVE MG/DL — SIGNIFICANT CHANGE UP
RBC # BLD: 4.49 M/UL — SIGNIFICANT CHANGE UP (ref 3.8–5.2)
RBC # FLD: 13.7 % — SIGNIFICANT CHANGE UP (ref 10.3–14.5)
RBC CASTS # UR COMP ASSIST: 0 /HPF — SIGNIFICANT CHANGE UP (ref 0–4)
SP GR SPEC: 1.01 — SIGNIFICANT CHANGE UP (ref 1–1.03)
SQUAMOUS # UR AUTO: 2 /HPF — SIGNIFICANT CHANGE UP (ref 0–5)
TROPONIN T, HIGH SENSITIVITY RESULT: 8 NG/L — SIGNIFICANT CHANGE UP (ref 0–51)
UROBILINOGEN FLD QL: 0.2 MG/DL — SIGNIFICANT CHANGE UP (ref 0.2–1)
WBC # BLD: 15.69 K/UL — HIGH (ref 3.8–10.5)
WBC # FLD AUTO: 15.69 K/UL — HIGH (ref 3.8–10.5)
WBC UR QL: 2 /HPF — SIGNIFICANT CHANGE UP (ref 0–5)

## 2025-03-05 PROCEDURE — 87086 URINE CULTURE/COLONY COUNT: CPT

## 2025-03-05 PROCEDURE — 83880 ASSAY OF NATRIURETIC PEPTIDE: CPT

## 2025-03-05 PROCEDURE — 71045 X-RAY EXAM CHEST 1 VIEW: CPT

## 2025-03-05 PROCEDURE — 80053 COMPREHEN METABOLIC PANEL: CPT

## 2025-03-05 PROCEDURE — 84484 ASSAY OF TROPONIN QUANT: CPT

## 2025-03-05 PROCEDURE — 85025 COMPLETE CBC W/AUTO DIFF WBC: CPT

## 2025-03-05 PROCEDURE — 36415 COLL VENOUS BLD VENIPUNCTURE: CPT

## 2025-03-05 PROCEDURE — 83735 ASSAY OF MAGNESIUM: CPT

## 2025-03-05 PROCEDURE — 96361 HYDRATE IV INFUSION ADD-ON: CPT

## 2025-03-05 PROCEDURE — 96374 THER/PROPH/DIAG INJ IV PUSH: CPT | Mod: XU

## 2025-03-05 PROCEDURE — 73562 X-RAY EXAM OF KNEE 3: CPT

## 2025-03-05 PROCEDURE — 99236 HOSP IP/OBS SAME DATE HI 85: CPT

## 2025-03-05 PROCEDURE — 70450 CT HEAD/BRAIN W/O DYE: CPT | Mod: MC

## 2025-03-05 PROCEDURE — 99285 EMERGENCY DEPT VISIT HI MDM: CPT | Mod: 25

## 2025-03-05 PROCEDURE — 73562 X-RAY EXAM OF KNEE 3: CPT | Mod: 26,LT

## 2025-03-05 PROCEDURE — 73610 X-RAY EXAM OF ANKLE: CPT

## 2025-03-05 PROCEDURE — 73590 X-RAY EXAM OF LOWER LEG: CPT

## 2025-03-05 PROCEDURE — 93306 TTE W/DOPPLER COMPLETE: CPT | Mod: 26

## 2025-03-05 PROCEDURE — G0378: CPT

## 2025-03-05 PROCEDURE — 72125 CT NECK SPINE W/O DYE: CPT | Mod: MC

## 2025-03-05 PROCEDURE — 96375 TX/PRO/DX INJ NEW DRUG ADDON: CPT

## 2025-03-05 PROCEDURE — C8929: CPT

## 2025-03-05 PROCEDURE — 93005 ELECTROCARDIOGRAM TRACING: CPT

## 2025-03-05 PROCEDURE — 81001 URINALYSIS AUTO W/SCOPE: CPT

## 2025-03-05 PROCEDURE — 73630 X-RAY EXAM OF FOOT: CPT

## 2025-03-05 PROCEDURE — 73502 X-RAY EXAM HIP UNI 2-3 VIEWS: CPT

## 2025-03-05 RX ORDER — ATORVASTATIN CALCIUM 80 MG/1
80 TABLET, FILM COATED ORAL DAILY
Refills: 0 | Status: ACTIVE | OUTPATIENT
Start: 2025-03-05 | End: 2026-02-01

## 2025-03-05 RX ADMIN — ATORVASTATIN CALCIUM 80 MILLIGRAM(S): 80 TABLET, FILM COATED ORAL at 12:04

## 2025-03-05 RX ADMIN — Medication 320 MILLIGRAM(S): at 09:30

## 2025-03-05 RX ADMIN — Medication 20 MILLIGRAM(S): at 12:59

## 2025-03-05 RX ADMIN — Medication 2 MILLIGRAM(S): at 00:30

## 2025-03-05 NOTE — ED CDU PROVIDER DISPOSITION NOTE - NSFOLLOWUPINSTRUCTIONS_ED_ALL_ED_FT
1) Follow-up with your Primary Medical Doctor or referred doctor. Call today / next business day for prompt follow-up.  2) Return to Emergency room for any worsening or persistent pain, weakness, fever, or any other concerning symptoms.  3) See attached instruction sheets for additional information, including information regarding signs and symptoms to look out for, reasons to seek immediate care and other important instructions.  4) Follow-up with any specialists as discussed / noted as well. 1. Follow up with your PCP in 1-2 days.        Additionally please follow up with your cardiologist in 1 week.    Additionally please follow-up with your gastroenterologist in 1 to 2 weeks.  If you are unable to follow-up with gastroenterology and additional numbers listed below.  You had evidence of thickening of your esophagus seen on CAT scan.  Please discuss these results with your gastroenterologist.    [facility]  Gastroenterology  [address], [address]  [place] [address]  Phone: [phone]     2. Show copies of your reports given to you.       Take famotidine (Pepcid) over the counter 20 mg in the morning and 20 mg at night for the next 2 weeks.      Take all of your other medications as previously prescribed.     3. Please return to the ED immediately if you develop any worsening or continued chest pain, shortness of breath, palpitations, weakness, nausea/vomiting, lightheadedness, or for any other concerning symptoms.

## 2025-03-05 NOTE — ED CDU PROVIDER DISPOSITION NOTE - CLINICAL COURSE
69-year-old female past medical history of hyperlipidemia, hypertension presents to the emergency department after fall. Patient states she was hanging onto the back of her 's chair, it tipped over landing on her foot and she fell backwards onto her buttocks.  She got up and walked to the bathroom and then acutely got lightheaded and synopsized and fell onto the floor.  Head trauma, falling to her face.  Denies any fevers, chest pain, abdominal pain, shortness of breath, focal weakness, gait changes or other concerning symptoms at this time.  In ED, patient had imaging showing No acute intracranial hemorrhage, mass effect, or midline shift. No acute fracture or dislocation. Labs were significant for leukocytosis w/ repeat downtrending. Pt sent to CDU for frequent reeval, vitals q 4hrs, tele, TTE. 69-year-old female past medical history of hyperlipidemia, hypertension presents to the emergency department after fall. Patient states she was hanging onto the back of her 's chair, it tipped over landing on her foot and she fell backwards onto her buttocks.  She got up and walked to the bathroom and then acutely got lightheaded and synopsized and fell onto the floor.  Head trauma, falling to her face.  Denies any fevers, chest pain, abdominal pain, shortness of breath, focal weakness, gait changes or other concerning symptoms at this time.  In ED, patient had imaging showing No acute intracranial hemorrhage, mass effect, or midline shift. No acute fracture or dislocation. Labs were significant for leukocytosis w/ repeat downtrending. Pt sent to CDU for frequent reeval, vitals q 4hrs, tele, TTE.  CDU course, downtrending leukocytosis to 15.69, CXR without acute findings, urinalysis not consistent with infection.  Patient complaining of left knee pain in the morning with negative x-ray, ambulatory and ranging knee without issue.  Esophageal thickening incidentally noted on CT C-spine patient instructed to take famotidine.  TTE returned with no acute findings unchanged from 2017.  No events on telemetry.  Patient ready stable at time of discharge with outpatient follow-up with GI, cardiology, PCP.

## 2025-03-05 NOTE — ED CDU PROVIDER INITIAL DAY NOTE - OBJECTIVE STATEMENT
69-year-old female past medical history of hyperlipidemia, hypertension presents to the emergency department after fall. Patient states she was hanging onto the back of her 's chair, it tipped over landing on her foot and she fell backwards onto her buttocks.  She got up and walked to the bathroom and then acutely got lightheaded and synopsized and fell onto the floor.  Head trauma, falling to her face.  Denies any fevers, chest pain, abdominal pain, shortness of breath, focal weakness, gait changes or other concerning symptoms at this time.  In ED, patient had imaging showing No acute intracranial hemorrhage, mass effect, or midline shift. No acute fracture or dislocation. Labs were significant for leukocytosis w/ repeat downtrending. Pt sent to CDU for frequent reeval, vitals q 4hrs, tele, TTE.

## 2025-03-05 NOTE — ED CDU PROVIDER INITIAL DAY NOTE - ATTENDING APP SHARED VISIT CONTRIBUTION OF CARE
Attending MD Natarajan: I personally made/approved the management plan and take responsibility for the patient management.          *The above represents an initial assessment/impression. Please refer to progress notes for potential changes in patient clinical course*

## 2025-03-05 NOTE — ED CDU PROVIDER INITIAL DAY NOTE - DETAILS
69-year-old female past medical history of hyperlipidemia, hypertension presents to the emergency department after fall. Syncope   Plan : frequent reeval, vitals q 4hrs, tele, TTE.

## 2025-03-05 NOTE — ED ADULT NURSE REASSESSMENT NOTE - NS ED NURSE REASSESS COMMENT FT1
Patient found in stretcher breathing spontaneously and unlabored on Room Air. No signs of respiratory distress @ this time. The patient is alert and orientated x4 and responds appropriately. The patient presents with a Right AC 20G PIV that is C/D/I and saline locked @ this time. Upon assessment patient presents with her right foot noted to have abrasions, slightly reddened, and edematous compared to the bilateral left foot. Patient also noted to present with a bump above her eft eyebrow from the fall, and noted mild pain, but denies the need for medical pain intervention at this time. Pt denies chest pain, palpitations, shortness of breath, headache, visual disturbances, numbness/tingling, fever, chills, diaphoresis,  nausea, vomiting, constipation, diarrhea, or urinary symptoms. Safety and comfort measures provided, bed locked and in lowest position, side rails up for safety. VS to order and Awaiting results and update on the ongoing plan of care. Patient ambulated to and from bathroom with standby assist.
Pt received from FELICE Martinez. Pt oriented to CDU & plan of care was discussed. Pt A&O x 4. Pt in CDU for frequent reeval, vitals q 4hrs, tele, TTE. Pt denies any chest pain, SOB, dizziness or palpitations. Pt on a cardiac monitor in NSR, HR in 60's. V/S stable, pt afebrile,  IV in place, patent and free of signs of infiltration. Pt resting in bed. Safety & comfort measures maintained. Call bell in reach. Will continue to monitor.
Pt received from FELICE Michelle at 0230. Pt oriented to CDU and plan of care was discussed. Pt is observed for syncope, here for tele, TTE. Pt c/o R foot pain, 5/10, states she does not want pain medication at this time. Abrasions and mild swelling noted to R foot, no active bleeding at this time. A&Ox4, obeys commands, ambulatory via cane, independent. Denies lightheadedness, HA, dizziness, blurry vision at this time. Respirations spontaneous and unlabored. Denies SOB, dyspnea, cough, CP, palpitations. On CM, sinus bradycardia, HR 50s. IV site patent, no signs of infiltration noted. Denies N/V/D/C. Pt afebrile, denies chills. Pt resting in bed. Safety & comfort measures maintained. Call bell within reach.

## 2025-03-05 NOTE — ED CDU PROVIDER DISPOSITION NOTE - PATIENT PORTAL LINK FT
You can access the FollowMyHealth Patient Portal offered by [facility] by registering at the following website: [email or web address]. By joining [employer]’s FollowMyHealth portal, you will also be able to view your health information using other applications (apps) compatible with our system.

## 2025-03-05 NOTE — ED CDU PROVIDER INITIAL DAY NOTE - PROGRESS NOTE DETAILS
Left knee x-ray without acute findings.  TTE resulted normal LV cavity size, systolic and diastolic function, EF 71%, no regional WMA.  Normal RV size, systolic function.  No significant valvulopathy, no pericardial effusion, no change compared to prior TTE in 2017. UA not c/w infx.  All results and plan discussed with patient, all questions answered.  Discussed esophageal thickening incidentally noted on CT C-spine, advised patient to take over-the-counter Pepcid 20 mg twice per day and follow-up outpatient with her gastroenterologist, patient reports she has a gastroenterologist and cardiologist who she will follow-up with.  Patient stable for discharge. I received signout on this patient at the usual time, vital signs stable from last read, patient in no acute distress, no significant events on telemetry monitoring.  Patient seen evaluated at bedside with CDU attending, patient reporting left knee pain which is new today will obtain x-ray.  Patient otherwise with no acute complaints.  No focal neurodeficits on exam.  Left knee with full range of motion, no obvious bony/joint deformity.  Patient neurovascularly intact distally.  Patient presented initially with leukocytosis 20, downtrending to 15.69 will check urinalysis / UCx, patient has no focal infectious symptoms, CXR with no acute findings.

## 2025-03-05 NOTE — ED ADULT NURSE REASSESSMENT NOTE - NSFALLRISKINTERV_ED_ALL_ED
Assistance OOB with selected safe patient handling equipment if applicable/Communicate fall risk and risk factors to all staff, patient, and family/Monitor gait and stability/Orthostatic vital signs/Provide patient with walking aids/Provide visual cue: yellow wristband, yellow gown, etc/Reinforce activity limits and safety measures with patient and family/Call bell, personal items and telephone in reach/Instruct patient to call for assistance before getting out of bed/chair/stretcher/Non-slip footwear applied when patient is off stretcher/[place] to call system/Physically safe environment - no spills, clutter or unnecessary equipment/Purposeful Proactive Rounding/Room/bathroom lighting operational, light cord in reach

## 2025-03-05 NOTE — ED CDU PROVIDER DISPOSITION NOTE - ATTENDING APP SHARED VISIT CONTRIBUTION OF CARE
Noah Lindsey MD, Attending Physician:     Summary: 69-year-old female with history of hyperlipidemia and hypertension who presents with fall and subsequent lightheadedness and syncope.      In the ED, labs with leukocytosis of 20 which down trended to 15.7, electrolytes nonactionable, troponin 7, 8.  CT imaging showed no ICH, mass effect or MLS, C-spine with no acute Fx or traumatic subluxation. Extremity XR's showed no acute Fx or dislocations. CXR with clear lungs.     Patient was placed in the CDU for further monitoring, frequent reassessments, Q4 hour vital signs, telemetry, and echo.      Patient was evaluated by me in the morning, and reports mild pain to the L knee from the fall. Patient denies any lightheadedness, chest pain, SOB.  On examination, patient with elevated BP but otherwise stable vitals, well-appearing, in no acute distress.  Head NCAT, eyes PERRL.  Cardiac examination RRR, lungs CTAB with no W/R/R.  Abdomen soft, non-tender and non-distended, no rebound, no guarding, no rigidity. No CVA tenderness.  Examination of bilateral knee shows normal range of motion, extensor mechanism intact, no bony tenderness or deformity, no gross ligamentous laxity.  There is no calf tenderness or leg swelling. Negative Meagan's sign.  Neurovascularly intact in all 4 extremities with 5/5 strength, normal sensation, equal pulses and brisk capillary refill, soft compartments.  Cranial nerves III-XII intact, no pronator drift, normal speech and gait.    Pending Echo    Will have patient follow-up with GI for EGD given findings on CT C-S of thickening of upper esophagus. Noah Lindsey MD, Attending Physician:     Summary: 69-year-old female with history of hyperlipidemia and hypertension who presents with fall and subsequent lightheadedness and syncope.      In the ED, labs with leukocytosis of 20 which down trended to 15.7, electrolytes nonactionable, troponin 7, 8.  CT imaging showed no ICH, mass effect or MLS, C-spine with no acute Fx or traumatic subluxation. Extremity XR's showed no acute Fx or dislocations. CXR with clear lungs.     Patient was placed in the CDU for further monitoring, frequent reassessments, Q4 hour vital signs, telemetry, and echo.      Patient was evaluated by me in the morning, and reports mild pain to the L knee from the fall. Patient denies any lightheadedness, chest pain, SOB.  On examination, patient with elevated BP but otherwise stable vitals, well-appearing, in no acute distress.  Head NCAT, eyes PERRL.  Cardiac examination RRR, lungs CTAB with no W/R/R.  Abdomen soft, non-tender and non-distended, no rebound, no guarding, no rigidity. No CVA tenderness.  Examination of bilateral knee shows normal range of motion, extensor mechanism intact, no bony tenderness or deformity, no gross ligamentous laxity.  There is no calf tenderness or leg swelling. Negative Meagan's sign.  Neurovascularly intact in all 4 extremities with 5/5 strength, normal sensation, equal pulses and brisk capillary refill, soft compartments.  Cranial nerves III-XII intact, no pronator drift, normal speech and gait.    Echo showed normal LV systolic function with a EF of 71%, no regional wall motion abnormalities.  Left knee x-ray showed no acute fracture, dislocation or joint effusion.  Urinalysis negative for infection.    At 1330, the patient was reassessed and they state that their condition has improved. Stable vitals. Repeat HEENT exam benign. Repeat cardiovascular examination shows NRRR, equal pulses in all 4 extremities. Repeat chest exam shows no tenderness to the chest wall, no signs of traumatic injury. Repeat pulmonary examination shows equal bilateral lung sounds. Repeat neuro exam is benign without any neuro deficits in all 4 extremities. Repeat spine exam shows no midline TTP to C-T-L spine. Repeat abdominal examination shows no tenderness, no peritoneal signs including rebound or guarding. The patient was able to eat, drink, and ambulate without assistance in the ED.    Stable for discharge with close follow-up and strict return precautions. Discussed the indications and side-effects of applicable medications.  Informed patient of all diagnostic test results including labs and imaging. The patient has been informed of all concerning signs and symptoms to return to Emergency Department, the necessity to follow up with PMD within 1 to 2 days, and cardiologist within 3 to 5 days, and to avoid any exertion until clearance was explained, or to return to the ED if unable to follow-up appropriately, and the patient reports understanding of above with capacity and insight. Also, will have patient follow-up with GI for EGD given findings on CT C-S of thickening of upper esophagus.

## 2025-03-06 LAB
CULTURE RESULTS: SIGNIFICANT CHANGE UP
SPECIMEN SOURCE: SIGNIFICANT CHANGE UP

## 2025-03-07 ENCOUNTER — OUTPATIENT (OUTPATIENT)
Dept: OUTPATIENT SERVICES | Facility: HOSPITAL | Age: 70
LOS: 1 days | End: 2025-03-07
Payer: MEDICARE

## 2025-03-07 ENCOUNTER — APPOINTMENT (OUTPATIENT)
Dept: INTERNAL MEDICINE | Facility: CLINIC | Age: 70
End: 2025-03-07
Payer: MEDICARE

## 2025-03-07 VITALS
HEART RATE: 62 BPM | OXYGEN SATURATION: 98 % | WEIGHT: 158 LBS | BODY MASS INDEX: 27.99 KG/M2 | DIASTOLIC BLOOD PRESSURE: 60 MMHG | SYSTOLIC BLOOD PRESSURE: 120 MMHG

## 2025-03-07 DIAGNOSIS — I10 ESSENTIAL (PRIMARY) HYPERTENSION: ICD-10-CM
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
## 2025-03-07 DIAGNOSIS — K21.9 GASTRO-ESOPHAGEAL REFLUX DISEASE W/OUT ESOPHAGITIS: ICD-10-CM

## 2025-03-07 DIAGNOSIS — K29.71 GASTRITIS, UNSPECIFIED, WITH BLEEDING: ICD-10-CM

## 2025-03-07 DIAGNOSIS — S90.819A ABRASION, UNSPECIFIED FOOT, INITIAL ENCOUNTER: ICD-10-CM

## 2025-03-07 DIAGNOSIS — S90.811A ABRASION, RIGHT FOOT, INITIAL ENCOUNTER: ICD-10-CM

## 2025-03-07 PROCEDURE — G2211 COMPLEX E/M VISIT ADD ON: CPT

## 2025-03-07 PROCEDURE — G0463: CPT

## 2025-03-07 PROCEDURE — 99215 OFFICE O/P EST HI 40 MIN: CPT

## 2025-03-07 RX ORDER — BACITRACIN ZINC, NEOMYCIN SULFATE, POLYMYXIN B SULFATE, PRAMOXINE HCL 500; 3.5; 10000; 1 [USP'U]/G; MG/G; [USP'U]/G; MG/G
1 OINTMENT TOPICAL TWICE DAILY
Qty: 1 | Refills: 0 | Status: ACTIVE | COMMUNITY
Start: 2025-03-07 | End: 1900-01-01

## 2025-03-07 RX ORDER — PANTOPRAZOLE 40 MG/1
40 TABLET, DELAYED RELEASE ORAL
Qty: 90 | Refills: 3 | Status: ACTIVE | COMMUNITY
Start: 2025-03-07 | End: 1900-01-01

## 2025-03-10 DIAGNOSIS — K29.71 GASTRITIS, UNSPECIFIED, WITH BLEEDING: ICD-10-CM

## 2025-03-10 DIAGNOSIS — S90.811A ABRASION, RIGHT FOOT, INITIAL ENCOUNTER: ICD-10-CM

## 2025-03-10 DIAGNOSIS — K21.9 GASTRO-ESOPHAGEAL REFLUX DISEASE WITHOUT ESOPHAGITIS: ICD-10-CM
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
## 2025-03-12 ENCOUNTER — APPOINTMENT (OUTPATIENT)
Dept: CARDIOLOGY | Facility: CLINIC | Age: 70
End: 2025-03-12

## 2025-03-12 ENCOUNTER — APPOINTMENT (OUTPATIENT)
Dept: CV DIAGNOSTICS | Facility: HOSPITAL | Age: 70
End: 2025-03-12

## 2025-03-12 ENCOUNTER — OUTPATIENT (OUTPATIENT)
Dept: OUTPATIENT SERVICES | Facility: HOSPITAL | Age: 70
LOS: 1 days | End: 2025-03-12
Payer: MEDICARE

## 2025-03-12 ENCOUNTER — NON-APPOINTMENT (OUTPATIENT)
Age: 70
End: 2025-03-12

## 2025-03-12 ENCOUNTER — RESULT REVIEW (OUTPATIENT)
Age: 70
End: 2025-03-12

## 2025-03-12 DIAGNOSIS — R07.89 OTHER CHEST PAIN: ICD-10-CM

## 2025-03-12 PROCEDURE — 93018 CV STRESS TEST I&R ONLY: CPT

## 2025-03-12 PROCEDURE — 93016 CV STRESS TEST SUPVJ ONLY: CPT

## 2025-03-12 PROCEDURE — A9500: CPT

## 2025-03-12 PROCEDURE — 78452 HT MUSCLE IMAGE SPECT MULT: CPT | Mod: 26

## 2025-03-12 PROCEDURE — 78452 HT MUSCLE IMAGE SPECT MULT: CPT | Mod: MC

## 2025-03-12 PROCEDURE — 93017 CV STRESS TEST TRACING ONLY: CPT

## 2025-03-25 NOTE — ED PROVIDER NOTE - CROS ED GU ALL NEG
Chief Complaint   Patient presents with    Hypertension     6 month follow up with labs. Health Maintenance Due   Topic Date Due    Pneumococcal 65+ years (2 of 2 - PPSV23) 10/05/2016    Shingrix Vaccine Age 50> (2 of 2) 11/13/2018    MEDICARE YEARLY EXAM  10/10/2019     Patient given pneumococcal vaccine, Pneumovax 23, in right deltoid, per verbal order from Dr. Val Swift with read back. Instructed patient to sit and wait 10-20 minutes before leaving the premises so that we can watch for any complications or adverse reactions. Patient given vaccine information statement handout before vaccine was given. Patient tolerated well without adverse reactions or complications. 1. Have you been to the ER, urgent care clinic or hospitalized since your last visit? NO.     2. Have you seen or consulted any other health care providers outside of the [address] [place] [address] since your last visit (Include any pap smears or colon screening)? NO      Learning Assessment 7/24/2019   PRIMARY LEARNER Patient   HIGHEST LEVEL OF EDUCATION - PRIMARY LEARNER  -   BARRIERS PRIMARY LEARNER -   CO-LEARNER CAREGIVER -   PRIMARY LANGUAGE ENGLISH   LEARNER PREFERENCE PRIMARY DEMONSTRATION   ANSWERED BY Patient   RELATIONSHIP SELF     Abuse Screening Questionnaire 10/30/2019   Do you ever feel afraid of your partner? N   Are you in a relationship with someone who physically or mentally threatens you? N   Is it safe for you to go home? Y     3 most recent PHQ Screens 10/30/2019   PHQ Not Done -   Little interest or pleasure in doing things Not at all   Feeling down, depressed, irritable, or hopeless Not at all   Total Score PHQ 2 0     Fall Risk Assessment, last 12 mths 10/30/2019   Able to walk? Yes   Fall in past 12 months?  No   Fall with injury? -   Number of falls in past 12 months -   Fall Risk Score -
1 person assist
verbal cues/1 person assist
negative...

## 2025-04-07 ENCOUNTER — RX RENEWAL (OUTPATIENT)
Age: 70
End: 2025-04-07

## 2025-04-07 NOTE — ED ADULT NURSE REASSESSMENT NOTE - GASTROINTESTINAL WDL
Abdomen soft, nontender, nondistended, bowel sounds present in all 4 quadrants. IV and/or equipment tethered to patient

## 2025-04-28 ENCOUNTER — OUTPATIENT (OUTPATIENT)
Dept: OUTPATIENT SERVICES | Facility: HOSPITAL | Age: 70
LOS: 1 days | End: 2025-04-28
Payer: COMMERCIAL

## 2025-04-28 ENCOUNTER — APPOINTMENT (OUTPATIENT)
Dept: INTERNAL MEDICINE | Facility: CLINIC | Age: 70
End: 2025-04-28
Payer: MEDICARE

## 2025-04-28 VITALS
HEART RATE: 60 BPM | BODY MASS INDEX: 27.64 KG/M2 | DIASTOLIC BLOOD PRESSURE: 64 MMHG | HEIGHT: 63 IN | WEIGHT: 156 LBS | OXYGEN SATURATION: 98 % | SYSTOLIC BLOOD PRESSURE: 124 MMHG

## 2025-04-28 DIAGNOSIS — I10 ESSENTIAL (PRIMARY) HYPERTENSION: ICD-10-CM

## 2025-04-28 DIAGNOSIS — F41.9 ANXIETY DISORDER, UNSPECIFIED: ICD-10-CM

## 2025-04-28 DIAGNOSIS — Z23 ENCOUNTER FOR IMMUNIZATION: ICD-10-CM

## 2025-04-28 DIAGNOSIS — L91.8 OTHER HYPERTROPHIC DISORDERS OF THE SKIN: ICD-10-CM

## 2025-04-28 DIAGNOSIS — K29.71 GASTRITIS, UNSPECIFIED, WITH BLEEDING: ICD-10-CM

## 2025-04-28 DIAGNOSIS — R68.89 OTHER GENERAL SYMPTOMS AND SIGNS: ICD-10-CM

## 2025-04-28 DIAGNOSIS — K21.9 GASTRO-ESOPHAGEAL REFLUX DISEASE W/OUT ESOPHAGITIS: ICD-10-CM

## 2025-04-28 DIAGNOSIS — F41.8 OTHER SPECIFIED ANXIETY DISORDERS: ICD-10-CM

## 2025-04-28 DIAGNOSIS — Z12.39 ENCOUNTER FOR OTHER SCREENING FOR MALIGNANT NEOPLASM OF BREAST: ICD-10-CM

## 2025-04-28 PROCEDURE — 84443 ASSAY THYROID STIM HORMONE: CPT

## 2025-04-28 PROCEDURE — G0463: CPT

## 2025-04-28 PROCEDURE — 80053 COMPREHEN METABOLIC PANEL: CPT

## 2025-04-28 PROCEDURE — 82607 VITAMIN B-12: CPT

## 2025-04-28 PROCEDURE — 82746 ASSAY OF FOLIC ACID SERUM: CPT

## 2025-04-28 PROCEDURE — 99213 OFFICE O/P EST LOW 20 MIN: CPT | Mod: GE

## 2025-04-28 PROCEDURE — 83036 HEMOGLOBIN GLYCOSYLATED A1C: CPT

## 2025-04-28 PROCEDURE — 85027 COMPLETE CBC AUTOMATED: CPT

## 2025-04-28 PROCEDURE — 80061 LIPID PANEL: CPT

## 2025-05-02 LAB
ALBUMIN SERPL ELPH-MCNC: 4.2 G/DL
ALP BLD-CCNC: 108 U/L
ALT SERPL-CCNC: 13 U/L
ANION GAP SERPL CALC-SCNC: 14 MMOL/L
AST SERPL-CCNC: 17 U/L
BILIRUB SERPL-MCNC: 0.7 MG/DL
BUN SERPL-MCNC: 15 MG/DL
CALCIUM SERPL-MCNC: 9.7 MG/DL
CHLORIDE SERPL-SCNC: 99 MMOL/L
CHOLEST SERPL-MCNC: 271 MG/DL
CO2 SERPL-SCNC: 28 MMOL/L
CREAT SERPL-MCNC: 1.28 MG/DL
EGFRCR SERPLBLD CKD-EPI 2021: 45 ML/MIN/1.73M2
ESTIMATED AVERAGE GLUCOSE: 120 MG/DL
FOLATE SERPL-MCNC: 6.3 NG/ML
GLUCOSE SERPL-MCNC: 87 MG/DL
HBA1C MFR BLD HPLC: 5.8 %
HCT VFR BLD CALC: 39.5 %
HDLC SERPL-MCNC: 42 MG/DL
HGB BLD-MCNC: 12.6 G/DL
LDLC SERPL-MCNC: 169 MG/DL
MCHC RBC-ENTMCNC: 28.5 PG
MCHC RBC-ENTMCNC: 31.9 G/DL
MCV RBC AUTO: 89.4 FL
NONHDLC SERPL-MCNC: 230 MG/DL
PLATELET # BLD AUTO: 289 K/UL
POTASSIUM SERPL-SCNC: 4 MMOL/L
PROT SERPL-MCNC: 7.3 G/DL
RBC # BLD: 4.42 M/UL
RBC # FLD: 14 %
SODIUM SERPL-SCNC: 141 MMOL/L
TRIGL SERPL-MCNC: 316 MG/DL
TSH SERPL-ACNC: 1.88 UIU/ML
VIT B12 SERPL-MCNC: 390 PG/ML
WBC # FLD AUTO: 10.56 K/UL

## 2025-05-12 DIAGNOSIS — K21.9 GASTRO-ESOPHAGEAL REFLUX DISEASE WITHOUT ESOPHAGITIS: ICD-10-CM

## 2025-05-12 DIAGNOSIS — Z12.39 ENCOUNTER FOR OTHER SCREENING FOR MALIGNANT NEOPLASM OF BREAST: ICD-10-CM

## 2025-05-12 DIAGNOSIS — R68.89 OTHER GENERAL SYMPTOMS AND SIGNS: ICD-10-CM

## 2025-05-12 DIAGNOSIS — F41.8 OTHER SPECIFIED ANXIETY DISORDERS: ICD-10-CM

## 2025-05-12 DIAGNOSIS — Z23 ENCOUNTER FOR IMMUNIZATION: ICD-10-CM

## 2025-05-12 DIAGNOSIS — L91.8 OTHER HYPERTROPHIC DISORDERS OF THE SKIN: ICD-10-CM

## 2025-05-12 DIAGNOSIS — K29.71 GASTRITIS, UNSPECIFIED, WITH BLEEDING: ICD-10-CM

## 2025-06-16 ENCOUNTER — OUTPATIENT (OUTPATIENT)
Dept: OUTPATIENT SERVICES | Facility: HOSPITAL | Age: 70
LOS: 1 days | End: 2025-06-16
Payer: COMMERCIAL

## 2025-06-16 ENCOUNTER — APPOINTMENT (OUTPATIENT)
Dept: INTERNAL MEDICINE | Facility: CLINIC | Age: 70
End: 2025-06-16
Payer: MEDICARE

## 2025-06-16 VITALS
TEMPERATURE: 98.4 F | BODY MASS INDEX: 28.17 KG/M2 | SYSTOLIC BLOOD PRESSURE: 140 MMHG | HEART RATE: 62 BPM | WEIGHT: 159 LBS | OXYGEN SATURATION: 98 % | DIASTOLIC BLOOD PRESSURE: 80 MMHG | HEIGHT: 63 IN

## 2025-06-16 DIAGNOSIS — I10 ESSENTIAL (PRIMARY) HYPERTENSION: ICD-10-CM

## 2025-06-16 PROBLEM — R09.89 UPPER RESPIRATORY SYMPTOM: Status: ACTIVE | Noted: 2025-06-16

## 2025-06-16 PROBLEM — Z87.09 HISTORY OF ASTHMA: Status: RESOLVED | Noted: 2017-11-15 | Resolved: 2025-06-16

## 2025-06-16 PROBLEM — Z86.79 HISTORY OF HYPERTENSION: Status: RESOLVED | Noted: 2025-06-16 | Resolved: 2025-06-16

## 2025-06-16 PROCEDURE — G2211 COMPLEX E/M VISIT ADD ON: CPT

## 2025-06-16 PROCEDURE — G0463: CPT

## 2025-06-16 PROCEDURE — 87637 SARSCOV2&INF A&B&RSV AMP PRB: CPT

## 2025-06-16 PROCEDURE — 85027 COMPLETE CBC AUTOMATED: CPT

## 2025-06-16 PROCEDURE — 99214 OFFICE O/P EST MOD 30 MIN: CPT | Mod: GC

## 2025-06-16 PROCEDURE — 0241U: CPT

## 2025-06-16 PROCEDURE — 85379 FIBRIN DEGRADATION QUANT: CPT

## 2025-06-16 PROCEDURE — 84145 PROCALCITONIN (PCT): CPT

## 2025-06-16 RX ORDER — EZETIMIBE 10 MG/1
10 TABLET ORAL DAILY
Qty: 90 | Refills: 1 | Status: ACTIVE | COMMUNITY
Start: 2025-06-16 | End: 1900-01-01

## 2025-06-16 RX ORDER — ROSUVASTATIN CALCIUM 40 MG/1
40 TABLET, FILM COATED ORAL DAILY
Qty: 90 | Refills: 0 | Status: ACTIVE | COMMUNITY
Start: 2025-06-16 | End: 1900-01-01

## 2025-06-16 RX ORDER — CETIRIZINE HYDROCHLORIDE 10 MG/1
10 CAPSULE, LIQUID FILLED ORAL
Qty: 30 | Refills: 0 | Status: ACTIVE | COMMUNITY
Start: 2025-06-16 | End: 1900-01-01

## 2025-06-16 RX ORDER — GUAIFENESIN 600 MG/1
600 TABLET, EXTENDED RELEASE ORAL
Qty: 15 | Refills: 0 | Status: ACTIVE | COMMUNITY
Start: 2025-06-16 | End: 1900-01-01

## 2025-06-16 RX ORDER — FLUTICASONE PROPIONATE 50 UG/1
50 SPRAY NASAL DAILY
Qty: 1 | Refills: 0 | Status: ACTIVE | COMMUNITY
Start: 2025-06-16 | End: 1900-01-01

## 2025-06-17 LAB
DEPRECATED D DIMER PPP IA-ACNC: <150 NG/ML DDU
HCT VFR BLD CALC: 39 %
HGB BLD-MCNC: 12.5 G/DL
INFLUENZA A RESULT: NOT DETECTED
INFLUENZA B RESULT: NOT DETECTED
MCHC RBC-ENTMCNC: 28.6 PG
MCHC RBC-ENTMCNC: 32.1 G/DL
MCV RBC AUTO: 89.2 FL
PLATELET # BLD AUTO: 272 K/UL
PROCALCITONIN SERPL-MCNC: 0.03 NG/ML
RBC # BLD: 4.37 M/UL
RBC # FLD: 13.2 %
RESP SYN VIRUS RESULT: NOT DETECTED
SARS-COV-2 RESULT: NOT DETECTED
WBC # FLD AUTO: 10.27 K/UL

## 2025-07-01 DIAGNOSIS — R09.89 OTHER SPECIFIED SYMPTOMS AND SIGNS INVOLVING THE CIRCULATORY AND RESPIRATORY SYSTEMS: ICD-10-CM

## 2025-07-01 DIAGNOSIS — Z87.09 PERSONAL HISTORY OF OTHER DISEASES OF THE RESPIRATORY SYSTEM: ICD-10-CM

## 2025-07-01 DIAGNOSIS — E78.5 HYPERLIPIDEMIA, UNSPECIFIED: ICD-10-CM

## 2025-07-01 DIAGNOSIS — Z86.79 PERSONAL HISTORY OF OTHER DISEASES OF THE CIRCULATORY SYSTEM: ICD-10-CM

## 2025-07-07 ENCOUNTER — RX RENEWAL (OUTPATIENT)
Age: 70
End: 2025-07-07

## 2025-08-12 ENCOUNTER — OUTPATIENT (OUTPATIENT)
Dept: OUTPATIENT SERVICES | Facility: HOSPITAL | Age: 70
LOS: 1 days | End: 2025-08-12
Payer: COMMERCIAL

## 2025-08-12 ENCOUNTER — APPOINTMENT (OUTPATIENT)
Dept: INTERNAL MEDICINE | Facility: CLINIC | Age: 70
End: 2025-08-12
Payer: MEDICARE

## 2025-08-12 VITALS
DIASTOLIC BLOOD PRESSURE: 60 MMHG | OXYGEN SATURATION: 98 % | HEIGHT: 63 IN | BODY MASS INDEX: 27.29 KG/M2 | SYSTOLIC BLOOD PRESSURE: 130 MMHG | HEART RATE: 70 BPM | WEIGHT: 154 LBS

## 2025-08-12 DIAGNOSIS — I10 ESSENTIAL (PRIMARY) HYPERTENSION: ICD-10-CM

## 2025-08-12 DIAGNOSIS — M25.562 PAIN IN LEFT KNEE: ICD-10-CM

## 2025-08-12 DIAGNOSIS — E78.5 HYPERLIPIDEMIA, UNSPECIFIED: ICD-10-CM

## 2025-08-12 DIAGNOSIS — R50.9 FEVER, UNSPECIFIED: ICD-10-CM

## 2025-08-12 DIAGNOSIS — R19.7 DIARRHEA, UNSPECIFIED: ICD-10-CM

## 2025-08-12 DIAGNOSIS — M54.9 DORSALGIA, UNSPECIFIED: ICD-10-CM

## 2025-08-12 PROCEDURE — G0463: CPT

## 2025-08-12 PROCEDURE — 99214 OFFICE O/P EST MOD 30 MIN: CPT

## 2025-08-13 PROBLEM — R19.7 DIARRHEA: Status: ACTIVE | Noted: 2025-08-13

## 2025-08-13 PROBLEM — R50.9 SUBJECTIVE FEVER: Status: ACTIVE | Noted: 2025-08-13

## 2025-08-14 ENCOUNTER — APPOINTMENT (OUTPATIENT)
Dept: RADIOLOGY | Facility: CLINIC | Age: 70
End: 2025-08-14

## 2025-08-14 PROCEDURE — 72100 X-RAY EXAM L-S SPINE 2/3 VWS: CPT

## 2025-08-21 DIAGNOSIS — R50.9 FEVER, UNSPECIFIED: ICD-10-CM

## 2025-08-21 DIAGNOSIS — M25.562 PAIN IN LEFT KNEE: ICD-10-CM

## 2025-08-21 DIAGNOSIS — R19.7 DIARRHEA, UNSPECIFIED: ICD-10-CM

## 2025-08-21 DIAGNOSIS — E78.5 HYPERLIPIDEMIA, UNSPECIFIED: ICD-10-CM

## 2025-08-22 ENCOUNTER — NON-APPOINTMENT (OUTPATIENT)
Age: 70
End: 2025-08-22

## 2025-09-04 ENCOUNTER — APPOINTMENT (OUTPATIENT)
Dept: ORTHOPEDIC SURGERY | Facility: CLINIC | Age: 70
End: 2025-09-04
Payer: MEDICARE

## 2025-09-04 VITALS — WEIGHT: 152 LBS | HEIGHT: 64 IN | BODY MASS INDEX: 25.95 KG/M2

## 2025-09-04 DIAGNOSIS — M17.12 UNILATERAL PRIMARY OSTEOARTHRITIS, LEFT KNEE: ICD-10-CM

## 2025-09-04 PROCEDURE — 20610 DRAIN/INJ JOINT/BURSA W/O US: CPT | Mod: LT

## 2025-09-04 PROCEDURE — 73564 X-RAY EXAM KNEE 4 OR MORE: CPT | Mod: LT

## 2025-09-04 PROCEDURE — 99214 OFFICE O/P EST MOD 30 MIN: CPT | Mod: 25

## 2025-09-19 ENCOUNTER — RX RENEWAL (OUTPATIENT)
Age: 70
End: 2025-09-19

## (undated) DEVICE — SOL INJ NS 0.9% 500ML 2 PORT

## (undated) DEVICE — SYR LUER LOK 50CC

## (undated) DEVICE — CLAMP BX HOT RAD JAW 3

## (undated) DEVICE — BRUSH COLONOSCOPY CYTOLOGY

## (undated) DEVICE — IRRIGATOR BIO SHIELD

## (undated) DEVICE — TUBING IV SET GRAVITY 3Y 100" MACRO

## (undated) DEVICE — SUCTION YANKAUER NO CONTROL VENT

## (undated) DEVICE — SNARE EXACTO COLD 9MMX230CM

## (undated) DEVICE — BITE BLOCK ADULT 20 X 27MM (GREEN)

## (undated) DEVICE — BALLOON US ENDO

## (undated) DEVICE — PACK IV START WITH CHG

## (undated) DEVICE — ELCTR GROUNDING PAD ADULT COVIDIEN

## (undated) DEVICE — FORCEP RADIAL JAW 4 JUMBO 2.8MM 3.2MM 240CM ORANGE DISP

## (undated) DEVICE — SENSOR O2 FINGER ADULT

## (undated) DEVICE — CATH IV SAFE BC 22G X 1" (BLUE)

## (undated) DEVICE — BIOPSY FORCEP RADIAL JAW 4 STANDARD WITH NEEDLE

## (undated) DEVICE — TUBING SUCTION 20FT

## (undated) DEVICE — TUBING SUCTION CONN 6FT STERILE

## (undated) DEVICE — SYR ALLIANCE II INFLATION 60ML

## (undated) DEVICE — POLY TRAP ETRAP

## (undated) DEVICE — CATH IV SAFE BC 20G X 1.16" (PINK)

## (undated) DEVICE — FOLEY HOLDER STATLOCK 2 WAY ADULT